# Patient Record
Sex: FEMALE | Race: WHITE | NOT HISPANIC OR LATINO | URBAN - METROPOLITAN AREA
[De-identification: names, ages, dates, MRNs, and addresses within clinical notes are randomized per-mention and may not be internally consistent; named-entity substitution may affect disease eponyms.]

---

## 2017-02-04 ENCOUNTER — OUTPATIENT (OUTPATIENT)
Dept: OUTPATIENT SERVICES | Facility: HOSPITAL | Age: 82
LOS: 1 days | Discharge: HOME | End: 2017-02-04

## 2017-06-27 DIAGNOSIS — Z00.00 ENCOUNTER FOR GENERAL ADULT MEDICAL EXAMINATION WITHOUT ABNORMAL FINDINGS: ICD-10-CM

## 2017-09-21 ENCOUNTER — INPATIENT (INPATIENT)
Facility: HOSPITAL | Age: 82
LOS: 7 days | Discharge: NURSING, REHAB OR LONG-TERM CARE | End: 2017-09-29
Attending: HOSPITALIST | Admitting: INTERNAL MEDICINE

## 2017-09-21 DIAGNOSIS — R07.9 CHEST PAIN, UNSPECIFIED: ICD-10-CM

## 2017-09-21 DIAGNOSIS — E78.5 HYPERLIPIDEMIA, UNSPECIFIED: ICD-10-CM

## 2017-09-21 DIAGNOSIS — S72.21XA DISPLACED SUBTROCHANTERIC FRACTURE OF RIGHT FEMUR, INITIAL ENCOUNTER FOR CLOSED FRACTURE: ICD-10-CM

## 2017-09-21 DIAGNOSIS — R29.6 REPEATED FALLS: ICD-10-CM

## 2017-09-21 DIAGNOSIS — E03.9 HYPOTHYROIDISM, UNSPECIFIED: ICD-10-CM

## 2017-09-30 ENCOUNTER — OUTPATIENT (OUTPATIENT)
Dept: OUTPATIENT SERVICES | Facility: HOSPITAL | Age: 82
LOS: 1 days | Discharge: HOME | End: 2017-09-30

## 2017-09-30 DIAGNOSIS — R29.6 REPEATED FALLS: ICD-10-CM

## 2017-09-30 DIAGNOSIS — R07.9 CHEST PAIN, UNSPECIFIED: ICD-10-CM

## 2017-09-30 DIAGNOSIS — R79.9 ABNORMAL FINDING OF BLOOD CHEMISTRY, UNSPECIFIED: ICD-10-CM

## 2017-09-30 DIAGNOSIS — E03.9 HYPOTHYROIDISM, UNSPECIFIED: ICD-10-CM

## 2017-09-30 DIAGNOSIS — S72.21XA DISPLACED SUBTROCHANTERIC FRACTURE OF RIGHT FEMUR, INITIAL ENCOUNTER FOR CLOSED FRACTURE: ICD-10-CM

## 2017-09-30 DIAGNOSIS — E78.5 HYPERLIPIDEMIA, UNSPECIFIED: ICD-10-CM

## 2017-10-04 DIAGNOSIS — E03.9 HYPOTHYROIDISM, UNSPECIFIED: ICD-10-CM
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
## 2017-10-04 DIAGNOSIS — D62 ACUTE POSTHEMORRHAGIC ANEMIA: ICD-10-CM

## 2017-10-04 DIAGNOSIS — S72.21XA DISPLACED SUBTROCHANTERIC FRACTURE OF RIGHT FEMUR, INITIAL ENCOUNTER FOR CLOSED FRACTURE: ICD-10-CM
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
## 2017-10-04 DIAGNOSIS — E78.00 PURE HYPERCHOLESTEROLEMIA, UNSPECIFIED: ICD-10-CM

## 2017-10-04 DIAGNOSIS — M81.0 AGE-RELATED OSTEOPOROSIS WITHOUT CURRENT PATHOLOGICAL FRACTURE: ICD-10-CM

## 2017-10-04 DIAGNOSIS — F03.90 UNSPECIFIED DEMENTIA, UNSPECIFIED SEVERITY, WITHOUT BEHAVIORAL DISTURBANCE, PSYCHOTIC DISTURBANCE, MOOD DISTURBANCE, AND ANXIETY: ICD-10-CM

## 2017-10-04 DIAGNOSIS — S72.001A FRACTURE OF UNSPECIFIED PART OF NECK OF RIGHT FEMUR, INITIAL ENCOUNTER FOR CLOSED FRACTURE: ICD-10-CM
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
## 2017-10-18 ENCOUNTER — OUTPATIENT (OUTPATIENT)
Dept: OUTPATIENT SERVICES | Facility: HOSPITAL | Age: 82
LOS: 1 days | Discharge: HOME | End: 2017-10-18

## 2017-10-18 DIAGNOSIS — S72.21XA DISPLACED SUBTROCHANTERIC FRACTURE OF RIGHT FEMUR, INITIAL ENCOUNTER FOR CLOSED FRACTURE: ICD-10-CM

## 2017-10-18 DIAGNOSIS — E78.5 HYPERLIPIDEMIA, UNSPECIFIED: ICD-10-CM

## 2017-10-18 DIAGNOSIS — R29.6 REPEATED FALLS: ICD-10-CM

## 2017-10-18 DIAGNOSIS — R79.9 ABNORMAL FINDING OF BLOOD CHEMISTRY, UNSPECIFIED: ICD-10-CM

## 2017-10-18 DIAGNOSIS — R07.9 CHEST PAIN, UNSPECIFIED: ICD-10-CM

## 2017-10-18 DIAGNOSIS — E03.9 HYPOTHYROIDISM, UNSPECIFIED: ICD-10-CM

## 2017-10-30 ENCOUNTER — OUTPATIENT (OUTPATIENT)
Dept: OUTPATIENT SERVICES | Facility: HOSPITAL | Age: 82
LOS: 1 days | Discharge: HOME | End: 2017-10-30

## 2017-10-30 DIAGNOSIS — R29.6 REPEATED FALLS: ICD-10-CM

## 2017-10-30 DIAGNOSIS — R79.9 ABNORMAL FINDING OF BLOOD CHEMISTRY, UNSPECIFIED: ICD-10-CM

## 2017-10-30 DIAGNOSIS — S72.21XA DISPLACED SUBTROCHANTERIC FRACTURE OF RIGHT FEMUR, INITIAL ENCOUNTER FOR CLOSED FRACTURE: ICD-10-CM

## 2017-10-30 DIAGNOSIS — E03.9 HYPOTHYROIDISM, UNSPECIFIED: ICD-10-CM

## 2017-10-30 DIAGNOSIS — N39.0 URINARY TRACT INFECTION, SITE NOT SPECIFIED: ICD-10-CM

## 2017-10-30 DIAGNOSIS — E78.5 HYPERLIPIDEMIA, UNSPECIFIED: ICD-10-CM

## 2017-10-30 DIAGNOSIS — R07.9 CHEST PAIN, UNSPECIFIED: ICD-10-CM

## 2018-01-24 ENCOUNTER — INPATIENT (INPATIENT)
Facility: HOSPITAL | Age: 83
LOS: 0 days | Discharge: HOME HEALTH | End: 2018-01-25
Attending: INTERNAL MEDICINE | Admitting: INTERNAL MEDICINE

## 2018-01-30 DIAGNOSIS — E03.9 HYPOTHYROIDISM, UNSPECIFIED: ICD-10-CM

## 2018-01-30 DIAGNOSIS — R50.9 FEVER, UNSPECIFIED: ICD-10-CM

## 2018-01-30 DIAGNOSIS — J11.1 INFLUENZA DUE TO UNIDENTIFIED INFLUENZA VIRUS WITH OTHER RESPIRATORY MANIFESTATIONS: ICD-10-CM

## 2018-01-30 DIAGNOSIS — F03.90 UNSPECIFIED DEMENTIA WITHOUT BEHAVIORAL DISTURBANCE: ICD-10-CM

## 2018-01-30 DIAGNOSIS — M81.0 AGE-RELATED OSTEOPOROSIS WITHOUT CURRENT PATHOLOGICAL FRACTURE: ICD-10-CM

## 2018-01-30 DIAGNOSIS — Z88.2 ALLERGY STATUS TO SULFONAMIDES: ICD-10-CM

## 2018-02-04 DIAGNOSIS — R29.6 REPEATED FALLS: ICD-10-CM

## 2018-02-04 DIAGNOSIS — E78.5 HYPERLIPIDEMIA, UNSPECIFIED: ICD-10-CM

## 2018-02-04 DIAGNOSIS — J11.1 INFLUENZA DUE TO UNIDENTIFIED INFLUENZA VIRUS WITH OTHER RESPIRATORY MANIFESTATIONS: ICD-10-CM

## 2018-02-04 DIAGNOSIS — R07.9 CHEST PAIN, UNSPECIFIED: ICD-10-CM

## 2018-02-04 DIAGNOSIS — S72.21XA DISPLACED SUBTROCHANTERIC FRACTURE OF RIGHT FEMUR, INITIAL ENCOUNTER FOR CLOSED FRACTURE: ICD-10-CM

## 2018-02-04 DIAGNOSIS — E03.9 HYPOTHYROIDISM, UNSPECIFIED: ICD-10-CM

## 2018-04-27 ENCOUNTER — OUTPATIENT (OUTPATIENT)
Dept: OUTPATIENT SERVICES | Facility: HOSPITAL | Age: 83
LOS: 1 days | Discharge: HOME | End: 2018-04-27

## 2018-04-27 DIAGNOSIS — G31.9 DEGENERATIVE DISEASE OF NERVOUS SYSTEM, UNSPECIFIED: ICD-10-CM

## 2018-05-04 ENCOUNTER — OUTPATIENT (OUTPATIENT)
Dept: OUTPATIENT SERVICES | Facility: HOSPITAL | Age: 83
LOS: 1 days | Discharge: HOME | End: 2018-05-04

## 2018-05-04 DIAGNOSIS — F31.9 BIPOLAR DISORDER, UNSPECIFIED: ICD-10-CM

## 2018-08-23 ENCOUNTER — EMERGENCY (EMERGENCY)
Facility: HOSPITAL | Age: 83
LOS: 0 days | Discharge: HOME | End: 2018-08-23
Attending: EMERGENCY MEDICINE | Admitting: EMERGENCY MEDICINE

## 2018-08-23 VITALS — RESPIRATION RATE: 16 BRPM | DIASTOLIC BLOOD PRESSURE: 58 MMHG | TEMPERATURE: 97 F | SYSTOLIC BLOOD PRESSURE: 128 MMHG

## 2018-08-23 VITALS
SYSTOLIC BLOOD PRESSURE: 123 MMHG | HEART RATE: 79 BPM | RESPIRATION RATE: 16 BRPM | DIASTOLIC BLOOD PRESSURE: 60 MMHG | TEMPERATURE: 98 F | OXYGEN SATURATION: 98 %

## 2018-08-23 DIAGNOSIS — F41.0 PANIC DISORDER [EPISODIC PAROXYSMAL ANXIETY]: ICD-10-CM

## 2018-08-23 DIAGNOSIS — R30.0 DYSURIA: ICD-10-CM

## 2018-08-23 DIAGNOSIS — E03.9 HYPOTHYROIDISM, UNSPECIFIED: ICD-10-CM

## 2018-08-23 DIAGNOSIS — Z88.2 ALLERGY STATUS TO SULFONAMIDES: ICD-10-CM

## 2018-08-23 DIAGNOSIS — F03.90 UNSPECIFIED DEMENTIA WITHOUT BEHAVIORAL DISTURBANCE: ICD-10-CM

## 2018-08-23 DIAGNOSIS — I10 ESSENTIAL (PRIMARY) HYPERTENSION: ICD-10-CM

## 2018-08-23 LAB
ALBUMIN SERPL ELPH-MCNC: 4.2 G/DL — SIGNIFICANT CHANGE UP (ref 3.5–5.2)
ALP SERPL-CCNC: 66 U/L — SIGNIFICANT CHANGE UP (ref 30–115)
ALT FLD-CCNC: 8 U/L — SIGNIFICANT CHANGE UP (ref 0–41)
ANION GAP SERPL CALC-SCNC: 15 MMOL/L — HIGH (ref 7–14)
APPEARANCE UR: CLEAR — SIGNIFICANT CHANGE UP
AST SERPL-CCNC: 22 U/L — SIGNIFICANT CHANGE UP (ref 0–41)
BASOPHILS # BLD AUTO: 0.07 K/UL — SIGNIFICANT CHANGE UP (ref 0–0.2)
BASOPHILS NFR BLD AUTO: 1.3 % — HIGH (ref 0–1)
BILIRUB SERPL-MCNC: 0.4 MG/DL — SIGNIFICANT CHANGE UP (ref 0.2–1.2)
BILIRUB UR-MCNC: NEGATIVE — SIGNIFICANT CHANGE UP
BUN SERPL-MCNC: 25 MG/DL — HIGH (ref 10–20)
CALCIUM SERPL-MCNC: 9.5 MG/DL — SIGNIFICANT CHANGE UP (ref 8.5–10.1)
CHLORIDE SERPL-SCNC: 103 MMOL/L — SIGNIFICANT CHANGE UP (ref 98–110)
CO2 SERPL-SCNC: 26 MMOL/L — SIGNIFICANT CHANGE UP (ref 17–32)
COLOR SPEC: YELLOW — SIGNIFICANT CHANGE UP
CREAT SERPL-MCNC: 1 MG/DL — SIGNIFICANT CHANGE UP (ref 0.7–1.5)
DIFF PNL FLD: NEGATIVE — SIGNIFICANT CHANGE UP
EOSINOPHIL # BLD AUTO: 0.13 K/UL — SIGNIFICANT CHANGE UP (ref 0–0.7)
EOSINOPHIL NFR BLD AUTO: 2.3 % — SIGNIFICANT CHANGE UP (ref 0–8)
GLUCOSE SERPL-MCNC: 100 MG/DL — HIGH (ref 70–99)
GLUCOSE UR QL: NEGATIVE MG/DL — SIGNIFICANT CHANGE UP
HCT VFR BLD CALC: 33.5 % — LOW (ref 37–47)
HGB BLD-MCNC: 11 G/DL — LOW (ref 12–16)
IMM GRANULOCYTES NFR BLD AUTO: 0.4 % — HIGH (ref 0.1–0.3)
KETONES UR-MCNC: NEGATIVE — SIGNIFICANT CHANGE UP
LACTATE SERPL-SCNC: 1.3 MMOL/L — SIGNIFICANT CHANGE UP (ref 0.5–2.2)
LEUKOCYTE ESTERASE UR-ACNC: NEGATIVE — SIGNIFICANT CHANGE UP
LYMPHOCYTES # BLD AUTO: 1.09 K/UL — LOW (ref 1.2–3.4)
LYMPHOCYTES # BLD AUTO: 19.5 % — LOW (ref 20.5–51.1)
MAGNESIUM SERPL-MCNC: 2.2 MG/DL — SIGNIFICANT CHANGE UP (ref 1.8–2.4)
MCHC RBC-ENTMCNC: 29.5 PG — SIGNIFICANT CHANGE UP (ref 27–31)
MCHC RBC-ENTMCNC: 32.8 G/DL — SIGNIFICANT CHANGE UP (ref 32–37)
MCV RBC AUTO: 89.8 FL — SIGNIFICANT CHANGE UP (ref 81–99)
MONOCYTES # BLD AUTO: 0.48 K/UL — SIGNIFICANT CHANGE UP (ref 0.1–0.6)
MONOCYTES NFR BLD AUTO: 8.6 % — SIGNIFICANT CHANGE UP (ref 1.7–9.3)
NEUTROPHILS # BLD AUTO: 3.81 K/UL — SIGNIFICANT CHANGE UP (ref 1.4–6.5)
NEUTROPHILS NFR BLD AUTO: 67.9 % — SIGNIFICANT CHANGE UP (ref 42.2–75.2)
NITRITE UR-MCNC: NEGATIVE — SIGNIFICANT CHANGE UP
NRBC # BLD: 0 /100 WBCS — SIGNIFICANT CHANGE UP (ref 0–0)
PH UR: 7.5 — SIGNIFICANT CHANGE UP (ref 5–8)
PLATELET # BLD AUTO: 248 K/UL — SIGNIFICANT CHANGE UP (ref 130–400)
POTASSIUM SERPL-MCNC: 5.6 MMOL/L — HIGH (ref 3.5–5)
POTASSIUM SERPL-SCNC: 5.6 MMOL/L — HIGH (ref 3.5–5)
PROT SERPL-MCNC: 6.9 G/DL — SIGNIFICANT CHANGE UP (ref 6–8)
PROT UR-MCNC: NEGATIVE MG/DL — SIGNIFICANT CHANGE UP
RBC # BLD: 3.73 M/UL — LOW (ref 4.2–5.4)
RBC # FLD: 12.8 % — SIGNIFICANT CHANGE UP (ref 11.5–14.5)
SODIUM SERPL-SCNC: 144 MMOL/L — SIGNIFICANT CHANGE UP (ref 135–146)
SP GR SPEC: 1.01 — SIGNIFICANT CHANGE UP (ref 1.01–1.03)
TROPONIN T SERPL-MCNC: <0.01 NG/ML — SIGNIFICANT CHANGE UP
UROBILINOGEN FLD QL: 0.2 MG/DL — SIGNIFICANT CHANGE UP (ref 0.2–0.2)
WBC # BLD: 5.6 K/UL — SIGNIFICANT CHANGE UP (ref 4.8–10.8)
WBC # FLD AUTO: 5.6 K/UL — SIGNIFICANT CHANGE UP (ref 4.8–10.8)

## 2018-08-23 RX ORDER — ASPIRIN/CALCIUM CARB/MAGNESIUM 324 MG
325 TABLET ORAL ONCE
Qty: 0 | Refills: 0 | Status: COMPLETED | OUTPATIENT
Start: 2018-08-23 | End: 2018-08-23

## 2018-08-23 RX ADMIN — Medication 325 MILLIGRAM(S): at 19:47

## 2018-08-23 NOTE — ED ADULT NURSE NOTE - NSIMPLEMENTINTERV_GEN_ALL_ED
Implemented All Fall with Harm Risk Interventions:  [place] to call system. Call bell, personal items and telephone within reach. Instruct patient to call for assistance. Room bathroom lighting operational. Non-slip footwear when patient is off stretcher. Physically safe environment: no spills, clutter or unnecessary equipment. Stretcher in lowest position, wheels locked, appropriate side rails in place. Provide visual cue, wrist band, yellow gown, etc. Monitor gait and stability. Monitor for mental status changes and reorient to person, place, and time. Review medications for side effects contributing to fall risk. Reinforce activity limits and safety measures with patient and family. Provide visual clues: red socks.

## 2018-08-23 NOTE — ED PROVIDER NOTE - DISCHARGE DATE
Pt requesting that message be sent to Dr. Truong to see if he will prescribe a back brace.   23-Aug-2018

## 2018-08-23 NOTE — ED PROVIDER NOTE - OBJECTIVE STATEMENT
89 y f pmh dementia, htn, hypothyroid pw confusion. Pt has been more confused than baseline for the past week. Pt's aide says that she is more paranoid and thinks strangers are out to get her. Hx of this in the past when she had a UTI. Has also been c/o dysuria and pain with urination. Incontinent at baseline and uses a diaper. Denies fever, chills, n/v, diarrhea, melena, hematochezia, constipation, back pain, cp, abd pain.

## 2018-08-23 NOTE — ED PROVIDER NOTE - PROGRESS NOTE DETAILS
Attending Note: I personally evaluated the patient. I reviewed the Resident’s note (as assigned above), and agree with the findings and plan except as documented in my note.   89 y/o F PMHx HTN, hypothyroid, and dementia brought in due to episodes of confusion. When I interviewed pt, aide not at bedside. As per aide, pt has been acting odd recently and tried to go outside in the rain. Pt has had episodes of UTI in past with confusion. Denies recent trauma, but aide notes her memory has also decreased recently. No CP or SOB. No abdominal pain. No back pain. Pt does c/o urinary SX. No speech, visual ,sensory or motor deficits. PE: well appearing, elderly F, NAD. VS noted. Lungs CTA. S1S2. Abdomen soft, mild suprapubic TTP, no CVAT.  Extremities no CCE. Neuro non focal except for memory. Pt also thinks her legs were replaced but that’s seems to be chronic and she is referring to her hip replacement. A/P: Will evaluate CT head, labs, UA, and reassess. Spoke with pt's son Ron. Pt lives with him and has aides during the day. Son is coming to pick her up and bring her home. Discussed labs and imaging with son. Will DC and wait for pt's ride home. Pt awake, alert, no complaints. Lying in bed comfortably.

## 2018-08-23 NOTE — ED PROVIDER NOTE - PHYSICAL EXAMINATION
CONSTITUTIONAL: Well-developed; well-nourished; in no acute distress.   SKIN: warm, dry  HEAD: Normocephalic; atraumatic.  EYES: PERRL, EOMI, normal sclera and conjunctiva   ENT: No nasal discharge; airway clear.  NECK: Supple; non tender.  CARD: S1, S2 normal; no murmurs, gallops, or rubs. Regular rate and rhythm.   RESP: No wheezes, rales or rhonchi.  ABD: soft, nondistended. TTP over suprapubic region.   EXT: Normal ROM.  No clubbing, cyanosis or edema.   LYMPH: No acute cervical adenopathy.  NEURO: Alert, awake, interactive. Acting at baseline per pt's aide. No cranial nerve deficits, moving all extremities well. No drift.   PSYCH: Cooperative, appropriate.

## 2018-08-23 NOTE — ED PROVIDER NOTE - NS ED ROS FT
Eyes:  No visual changes, eye pain or discharge.  ENMT:  No hearing changes, pain, discharge or infections. No neck pain or stiffness.  Cardiac:  No chest pain, SOB or edema.   Respiratory:  No cough or respiratory distress.   GI:  No nausea, vomiting, diarrhea or abdominal pain.  :  Dysuria. No increased frequency.  MS:  No myalgia, muscle weakness, joint pain or back pain.  Neuro: Confusion. No headache or weakness.  No LOC.  Skin:  No skin rash.   Endocrine: No history of diabetes. Hypothyroid.

## 2018-08-24 LAB
CULTURE RESULTS: NO GROWTH — SIGNIFICANT CHANGE UP
SPECIMEN SOURCE: SIGNIFICANT CHANGE UP

## 2018-10-29 ENCOUNTER — EMERGENCY (EMERGENCY)
Facility: HOSPITAL | Age: 83
LOS: 0 days | Discharge: HOME | End: 2018-10-29
Attending: EMERGENCY MEDICINE | Admitting: EMERGENCY MEDICINE

## 2018-10-29 VITALS
SYSTOLIC BLOOD PRESSURE: 189 MMHG | TEMPERATURE: 98 F | RESPIRATION RATE: 18 BRPM | DIASTOLIC BLOOD PRESSURE: 80 MMHG | OXYGEN SATURATION: 99 % | HEART RATE: 76 BPM

## 2018-10-29 DIAGNOSIS — Z88.2 ALLERGY STATUS TO SULFONAMIDES: ICD-10-CM
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
## 2018-10-29 DIAGNOSIS — M54.9 DORSALGIA, UNSPECIFIED: ICD-10-CM
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
## 2018-10-29 DIAGNOSIS — E03.9 HYPOTHYROIDISM, UNSPECIFIED: ICD-10-CM
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
## 2018-10-29 DIAGNOSIS — I10 ESSENTIAL (PRIMARY) HYPERTENSION: ICD-10-CM

## 2018-10-29 LAB
ALBUMIN SERPL ELPH-MCNC: 3.8 G/DL — SIGNIFICANT CHANGE UP (ref 3.5–5.2)
ALP SERPL-CCNC: 61 U/L — SIGNIFICANT CHANGE UP (ref 30–115)
ALT FLD-CCNC: 12 U/L — SIGNIFICANT CHANGE UP (ref 0–41)
ANION GAP SERPL CALC-SCNC: 10 MMOL/L — SIGNIFICANT CHANGE UP (ref 7–14)
APTT BLD: SIGNIFICANT CHANGE UP SEC (ref 27–39.2)
AST SERPL-CCNC: 26 U/L — SIGNIFICANT CHANGE UP (ref 0–41)
BASOPHILS # BLD AUTO: 0.03 K/UL — SIGNIFICANT CHANGE UP (ref 0–0.2)
BASOPHILS NFR BLD AUTO: 0.5 % — SIGNIFICANT CHANGE UP (ref 0–1)
BILIRUB SERPL-MCNC: 0.3 MG/DL — SIGNIFICANT CHANGE UP (ref 0.2–1.2)
BUN SERPL-MCNC: 24 MG/DL — HIGH (ref 10–20)
CALCIUM SERPL-MCNC: 9.7 MG/DL — SIGNIFICANT CHANGE UP (ref 8.5–10.1)
CHLORIDE SERPL-SCNC: 101 MMOL/L — SIGNIFICANT CHANGE UP (ref 98–110)
CO2 SERPL-SCNC: 25 MMOL/L — SIGNIFICANT CHANGE UP (ref 17–32)
CREAT SERPL-MCNC: 1 MG/DL — SIGNIFICANT CHANGE UP (ref 0.7–1.5)
EOSINOPHIL # BLD AUTO: 0.1 K/UL — SIGNIFICANT CHANGE UP (ref 0–0.7)
EOSINOPHIL NFR BLD AUTO: 1.8 % — SIGNIFICANT CHANGE UP (ref 0–8)
GLUCOSE SERPL-MCNC: 94 MG/DL — SIGNIFICANT CHANGE UP (ref 70–99)
HCT VFR BLD CALC: 34.2 % — LOW (ref 37–47)
HGB BLD-MCNC: 11 G/DL — LOW (ref 12–16)
IMM GRANULOCYTES NFR BLD AUTO: 1.8 % — HIGH (ref 0.1–0.3)
INR BLD: 0.88 RATIO — SIGNIFICANT CHANGE UP (ref 0.65–1.3)
LIDOCAIN IGE QN: 81 U/L — HIGH (ref 7–60)
LYMPHOCYTES # BLD AUTO: 0.83 K/UL — LOW (ref 1.2–3.4)
LYMPHOCYTES # BLD AUTO: 14.8 % — LOW (ref 20.5–51.1)
MCHC RBC-ENTMCNC: 29.3 PG — SIGNIFICANT CHANGE UP (ref 27–31)
MCHC RBC-ENTMCNC: 32.2 G/DL — SIGNIFICANT CHANGE UP (ref 32–37)
MCV RBC AUTO: 91 FL — SIGNIFICANT CHANGE UP (ref 81–99)
MONOCYTES # BLD AUTO: 0.33 K/UL — SIGNIFICANT CHANGE UP (ref 0.1–0.6)
MONOCYTES NFR BLD AUTO: 5.9 % — SIGNIFICANT CHANGE UP (ref 1.7–9.3)
NEUTROPHILS # BLD AUTO: 4.23 K/UL — SIGNIFICANT CHANGE UP (ref 1.4–6.5)
NEUTROPHILS NFR BLD AUTO: 75.2 % — SIGNIFICANT CHANGE UP (ref 42.2–75.2)
PLATELET # BLD AUTO: 230 K/UL — SIGNIFICANT CHANGE UP (ref 130–400)
POTASSIUM SERPL-MCNC: 5.3 MMOL/L — HIGH (ref 3.5–5)
POTASSIUM SERPL-SCNC: 5.3 MMOL/L — HIGH (ref 3.5–5)
PROT SERPL-MCNC: 6.9 G/DL — SIGNIFICANT CHANGE UP (ref 6–8)
PROTHROM AB SERPL-ACNC: 10.1 SEC — SIGNIFICANT CHANGE UP (ref 9.95–12.87)
RBC # BLD: 3.76 M/UL — LOW (ref 4.2–5.4)
RBC # FLD: 12.5 % — SIGNIFICANT CHANGE UP (ref 11.5–14.5)
SODIUM SERPL-SCNC: 136 MMOL/L — SIGNIFICANT CHANGE UP (ref 135–146)
WBC # BLD: 5.62 K/UL — SIGNIFICANT CHANGE UP (ref 4.8–10.8)
WBC # FLD AUTO: 5.62 K/UL — SIGNIFICANT CHANGE UP (ref 4.8–10.8)

## 2018-10-29 NOTE — ED ADULT NURSE NOTE - NSIMPLEMENTINTERV_GEN_ALL_ED
Implemented All Fall with Harm Risk Interventions:  [place]va to call system. Call bell, personal items and telephone within reach. Instruct patient to call for assistance. Room bathroom lighting operational. Non-slip footwear when patient is off stretcher. Physically safe environment: no spills, clutter or unnecessary equipment. Stretcher in lowest position, wheels locked, appropriate side rails in place. Provide visual cue, wrist band, yellow gown, etc. Monitor gait and stability. Monitor for mental status changes and reorient to person, place, and time. Review medications for side effects contributing to fall risk. Reinforce activity limits and safety measures with patient and family. Provide visual clues: red socks.

## 2018-10-29 NOTE — ED PROVIDER NOTE - NS ED ROS FT
Constitutional: No fever  Eyes:  No visual changes  ENMT:  No neck pain  Cardiac:  No chest pain  Respiratory:  No cough, SOB  GI:  No nausea, vomiting, abdominal pain.  :  No dysuria  MS:  +back pain.  Neuro:  No headache  Skin:  No skin rash  Endocrine: h/o hypothyroid  Except as documented in the HPI,  all other systems are negative.

## 2018-10-29 NOTE — ED PROVIDER NOTE - OBJECTIVE STATEMENT
89yo F with PMHx dementia, HTN, hypothyroid, p/w back pain s/p mechanical fall. Pt is with home health aide who states pt fell when trying to get out of bed this am. No head trauma or LOC. Having more frequent falls and worsening of dementia recently.

## 2018-10-29 NOTE — ED PROVIDER NOTE - PROGRESS NOTE DETAILS
ED Attending Note: I personally evaluated the patient. I reviewed the Resident’s note (as assigned above), and agree with the findings and plan except as documented in my note.   89 y/o F with home assistant at bedside p/w worsening back pain due to frequent falls. When pt tried to get out of bed this AM she fell. PE: non-toxic elderly female, GCS 15. Equal radial pulses b/l, EOMI, PEERL. Kyphotic. FROM x4, no c-d-s spine tenderness. Abdomen soft. Pt is able to ambulate in ED with assistance. No ataxic. No respiratory distress. Plan: labs, imaging, reassess. pending imaging /report

## 2019-11-14 ENCOUNTER — APPOINTMENT (OUTPATIENT)
Dept: GERIATRICS | Facility: CLINIC | Age: 84
End: 2019-11-14
Payer: MEDICARE

## 2019-11-14 ENCOUNTER — OUTPATIENT (OUTPATIENT)
Dept: OUTPATIENT SERVICES | Facility: HOSPITAL | Age: 84
LOS: 1 days | Discharge: HOME | End: 2019-11-14

## 2019-11-14 VITALS — SYSTOLIC BLOOD PRESSURE: 150 MMHG | DIASTOLIC BLOOD PRESSURE: 81 MMHG | HEART RATE: 63 BPM

## 2019-11-14 DIAGNOSIS — Z00.00 ENCOUNTER FOR GENERAL ADULT MEDICAL EXAMINATION W/OUT ABNORMAL FINDINGS: ICD-10-CM

## 2019-11-14 DIAGNOSIS — Z86.39 PERSONAL HISTORY OF OTHER ENDOCRINE, NUTRITIONAL AND METABOLIC DISEASE: ICD-10-CM

## 2019-11-14 PROCEDURE — ZZZZZ: CPT

## 2019-11-14 PROCEDURE — 99203 OFFICE O/P NEW LOW 30 MIN: CPT | Mod: GC

## 2019-11-14 NOTE — PHYSICAL EXAM
[Neck Appearance] : the appearance of the neck was normal [General Appearance - Alert] : alert [Heart Sounds] : normal S1 and S2 [] : no respiratory distress [Bowel Sounds] : normal bowel sounds [Abdomen Soft] : soft [FreeTextEntry1] : slow shuffling gait

## 2019-11-14 NOTE — END OF VISIT
[] : Resident [FreeTextEntry3] : Seen by [employer] team\par 89 year old with advanced dementia being cared for by supportive family at home; has HHA 9-5 7 days a week; previously on seroquel up to 00mg at night but periods of unrest, kagitation and visual and auditory hallucinations worsening; here today with son, daughter russ and HHA; she is in w/c; cooperative; also family notes incresed depression with crying; her behavior is occassionally agitative; after much discussion decided to \par 1. stop risperdal\par 2. start trazodone in evening to assist with very poor and disruptive sleep\par 3. start ow dose sertraline for depression\par counseled family to not argue with her re hallucinations and instead stay calm and roll with it as the hallucinations don’t bother the patient.  will get labs  RTC 6 weeks; also discussed psychiatric f/u once started on these meds.

## 2019-11-14 NOTE — HISTORY OF PRESENT ILLNESS
[1] : 2) Feeling down, depressed, or hopeless for several days [FreeTextEntry1] : 89 year old female with hx of advanced alzheimer, hypothyroidism, presenting to [facility]. Family reports patient is having increased episodes of forgetfullness, agitation, confusion, visual and auditory hallucinations. she also has episodes of falls. she had total hip replacement 2 years ago. gait is hshuffling and she uses a cane at home. family also reports mood swings, and suicidal thoughts

## 2019-11-14 NOTE — ASSESSMENT
[FreeTextEntry1] : 89 year old with hx of alzheimers dementia presenting to [facility] care\par \par #Dementia with psychotic features and depression\par - stop resperidone\par - start sertraline 25 qhs\par - start trazodone 50 qhs\par - labs for cbc, cmo, tsh\par - f/u in 4-6 weeks\par - influenza shot given

## 2019-12-11 LAB
ALBUMIN SERPL ELPH-MCNC: 4.1 G/DL
ALP BLD-CCNC: 68 U/L
ALT SERPL-CCNC: 11 U/L
ANION GAP SERPL CALC-SCNC: 12 MMOL/L
AST SERPL-CCNC: 18 U/L
BASOPHILS # BLD AUTO: 0.07 K/UL
BASOPHILS NFR BLD AUTO: 1 %
BILIRUB SERPL-MCNC: <0.2 MG/DL
BUN SERPL-MCNC: 37 MG/DL
CALCIUM SERPL-MCNC: 9.3 MG/DL
CHLORIDE SERPL-SCNC: 103 MMOL/L
CO2 SERPL-SCNC: 26 MMOL/L
CREAT SERPL-MCNC: 1 MG/DL
EOSINOPHIL # BLD AUTO: 0.16 K/UL
EOSINOPHIL NFR BLD AUTO: 2.3 %
GLUCOSE SERPL-MCNC: 78 MG/DL
HCT VFR BLD CALC: 32.4 %
HGB BLD-MCNC: 10.3 G/DL
IMM GRANULOCYTES NFR BLD AUTO: 0.3 %
LYMPHOCYTES # BLD AUTO: 1.12 K/UL
LYMPHOCYTES NFR BLD AUTO: 16.4 %
MAN DIFF?: NORMAL
MCHC RBC-ENTMCNC: 29.9 PG
MCHC RBC-ENTMCNC: 31.8 G/DL
MCV RBC AUTO: 93.9 FL
MONOCYTES # BLD AUTO: 0.64 K/UL
MONOCYTES NFR BLD AUTO: 9.4 %
NEUTROPHILS # BLD AUTO: 4.82 K/UL
NEUTROPHILS NFR BLD AUTO: 70.6 %
PLATELET # BLD AUTO: 239 K/UL
POTASSIUM SERPL-SCNC: 4.7 MMOL/L
PROT SERPL-MCNC: 6.5 G/DL
RBC # BLD: 3.45 M/UL
RBC # FLD: 13.2 %
SODIUM SERPL-SCNC: 141 MMOL/L
TSH SERPL-ACNC: 5.01 UIU/ML
WBC # FLD AUTO: 6.83 K/UL

## 2019-12-19 ENCOUNTER — OUTPATIENT (OUTPATIENT)
Dept: OUTPATIENT SERVICES | Facility: HOSPITAL | Age: 84
LOS: 1 days | Discharge: HOME | End: 2019-12-19

## 2019-12-19 ENCOUNTER — APPOINTMENT (OUTPATIENT)
Dept: GERIATRICS | Facility: CLINIC | Age: 84
End: 2019-12-19
Payer: MEDICARE

## 2019-12-19 VITALS
BODY MASS INDEX: 22.25 KG/M2 | WEIGHT: 106 LBS | HEART RATE: 99 BPM | DIASTOLIC BLOOD PRESSURE: 64 MMHG | SYSTOLIC BLOOD PRESSURE: 130 MMHG | TEMPERATURE: 98.2 F | HEIGHT: 58 IN

## 2019-12-19 PROCEDURE — 99214 OFFICE O/P EST MOD 30 MIN: CPT

## 2019-12-19 NOTE — HISTORY OF PRESENT ILLNESS
[FreeTextEntry1] : 89 year old lady with advanced, hypothyroidism presenting for routine follow up. The family states that she is having increased hallucinations, difficulty sleeping at night and worsening urinary incontinance. They also wish for her ears to be checked as she is not hearing well. They state that she is less combative as well. They would also like to speak to a  about assistence with services as she is becoming a 2 person assist and is not able to feed herself anymore let alone hold a fork.

## 2019-12-19 NOTE — END OF VISIT
[] : Resident [FreeTextEntry3] : Seen with [employer] TEAM\par 1. overall doing much better on sertraline/trazadone combination; still has visual hallucinations but not bothersome to her; is much less physically aggressive OFF RISPERDAL; also smiling more and seems less depressed; is taking low doses of both medications; after long discussion with family we decided not to increase meds but cont to observe with current tx; Plan for son to call me with further clinical f/u in 4 weeks (has been on this regimen for only one month\par 2. still with insomnia but will cont with current dose of trazadone; also takes camamile tea twice a day; no response to melatonin in past\par 3. has 4 more days of amoxilfor UTI; will change to liquid for ease of administration\par 4. had CSW  family on current hours of aid they get - is 45 hours a week and is unlikely they will get more as it was just assigned by her [facility].\par RTC 3 months; will speak with son in 4 weeks as above.

## 2019-12-19 NOTE — PHYSICAL EXAM
[General Appearance - In No Acute Distress] : in no acute distress [General Appearance - Alert] : alert [Sclera] : the sclera and conjunctiva were normal [Extraocular Movements] : extraocular movements were intact [PERRL With Normal Accommodation] : pupils were equal in size, round, and reactive to light [Normal Oral Mucosa] : normal oral mucosa [No Oral Cyanosis] : no oral cyanosis [No Oral Pallor] : no oral pallor [Oropharynx] : The oropharynx was normal [Outer Ear] : the ears and nose were normal in appearance [Jugular Venous Distention Increased] : there was no jugular-venous distention [Thyroid Diffuse Enlargement] : the thyroid was not enlarged [Neck Appearance] : the appearance of the neck was normal [Neck Cervical Mass (___cm)] : no neck mass was observed [Thyroid Nodule] : there were no palpable thyroid nodules [Heart Rate And Rhythm] : heart rate was normal and rhythm regular [Auscultation Breath Sounds / Voice Sounds] : lungs were clear to auscultation bilaterally [Heart Sounds] : normal S1 and S2 [Heart Sounds Gallop] : no gallops [Murmurs] : no murmurs [Heart Sounds Pericardial Friction Rub] : no pericardial rub [Bowel Sounds] : normal bowel sounds [Abdomen Soft] : soft [Abdomen Tenderness] : non-tender [Abdomen Mass (___ Cm)] : no abdominal mass palpated [Abnormal Walk] : normal gait [Nail Clubbing] : no clubbing  or cyanosis of the fingernails [Musculoskeletal - Swelling] : no joint swelling seen [Skin Color & Pigmentation] : normal skin color and pigmentation [Skin Turgor] : normal skin turgor [Motor Tone] : muscle strength and tone were normal [] : no rash [Deep Tendon Reflexes (DTR)] : deep tendon reflexes were 2+ and symmetric [Sensation] : the sensory exam was normal to light touch and pinprick [No Focal Deficits] : no focal deficits [Impaired Insight] : insight and judgment were intact [Affect] : the affect was normal [FreeTextEntry1] : AAOx1

## 2019-12-19 NOTE — ASSESSMENT
[FreeTextEntry1] : 89 year old lady with advanced, hypothyroidism presenting for routine follow up. \par \par #Dementia with psychotic features and depression\par - c/w sertraline 25 qhs\par - c/w trazodone 50 qhs\par - influenza shot given. \par \par UTI\par -will send amox liquid\par \par will have  speak to family for evaluation of assistance\par \par RTC 6 months and PRN\par

## 2019-12-27 ENCOUNTER — RX RENEWAL (OUTPATIENT)
Age: 84
End: 2019-12-27

## 2020-01-02 ENCOUNTER — OUTPATIENT (OUTPATIENT)
Dept: OUTPATIENT SERVICES | Facility: HOSPITAL | Age: 85
LOS: 1 days | Discharge: HOME | End: 2020-01-02

## 2020-01-02 ENCOUNTER — APPOINTMENT (OUTPATIENT)
Dept: GERIATRICS | Facility: CLINIC | Age: 85
End: 2020-01-02
Payer: MEDICARE

## 2020-01-02 VITALS
HEART RATE: 66 BPM | HEIGHT: 58 IN | WEIGHT: 105 LBS | SYSTOLIC BLOOD PRESSURE: 153 MMHG | BODY MASS INDEX: 22.04 KG/M2 | TEMPERATURE: 97.5 F | DIASTOLIC BLOOD PRESSURE: 84 MMHG

## 2020-01-02 DIAGNOSIS — F02.80 ALZHEIMER'S DISEASE, UNSPECIFIED: ICD-10-CM

## 2020-01-02 DIAGNOSIS — G30.9 ALZHEIMER'S DISEASE, UNSPECIFIED: ICD-10-CM

## 2020-01-02 PROCEDURE — 99214 OFFICE O/P EST MOD 30 MIN: CPT

## 2020-01-02 RX ORDER — TRAZODONE HYDROCHLORIDE 50 MG/1
50 TABLET ORAL
Qty: 30 | Refills: 5 | Status: DISCONTINUED | COMMUNITY
Start: 2019-11-14 | End: 2020-01-02

## 2020-01-02 NOTE — HISTORY OF PRESENT ILLNESS
[Severe] : Stage: Severe [Worse] : Status: Worse [None] : ~He/She~ has no significant interval events [Patient Observed To Be Agitated] : improved agitation [Hostility Toward Caregivers] : improved aggression [Sleep Disturbances] : worsened sleep disturbances [] : worsened wandering [Fixed Beliefs Contradicted By Reality (Delusions)] : worsened delusions [FreeTextEntry1] : 89 year old female with history of advanced dementia w/ psychotic features and hypothyroidism presents for follow-up after being seen 2 weeks previously. Family at bedside states that patient has not been sleeping at all overnight and has been wandering the house at night. This AM before presentation, family found her laying on the floor beside her bed and were concerned that she had a fall; however, they deny any head trauma or other injuries. Patient also has been having hallucinations, mostly at night but also during the day. Mood has been okay, no further suicidal ideation. Patient is currently taking trazodone 50mg and sertraline 25mg; family believes the medications are not working. Eating and drinking okay. ROS otherwise normal.  [de-identified] : not sleeping well

## 2020-01-02 NOTE — ASSESSMENT
[FreeTextEntry1] : 89 year old female with history of advanced dementia w/ psychotic features and hypothyroidism presents for follow-up after being seen 2 weeks previously with worsening sleep disturbances.\par \par #Advanced dementia w/ psychotic features\par -previously on seroquel but was stopped due to increased aggression and suicidality\par -was then on risperidone but stopped due to drooling\par -currently on trazodone and sertraline in an effort to address depressive symptoms; however, now sleep is worse\par -will d/c trazodone, continue with zoloft 50mg\par -will start zyprexa 2.5mg qd\par \par #hypothyroidism\par -c/w synthroid 50mcg qd\par \par #UTI\par -c/w macrobid 100mg (last day 1/3/20)\par \par RTC in 3 months or prn

## 2020-01-02 NOTE — PHYSICAL EXAM
[General Appearance - In No Acute Distress] : in no acute distress [General Appearance - Well-Appearing] : healthy appearing [Sclera] : the sclera and conjunctiva were normal [Normal Oral Mucosa] : normal oral mucosa [Outer Ear] : the ears and nose were normal in appearance [Neck Appearance] : the appearance of the neck was normal [] : no respiratory distress [Respiration, Rhythm And Depth] : normal respiratory rhythm and effort [Auscultation Breath Sounds / Voice Sounds] : lungs were clear to auscultation bilaterally [Apical Impulse] : the apical impulse was normal [Heart Sounds] : normal S1 and S2 [Bowel Sounds] : normal bowel sounds [Abdomen Tenderness] : non-tender [Abdomen Soft] : soft [No Focal Deficits] : no focal deficits

## 2020-01-02 NOTE — END OF VISIT
[] : Resident [FreeTextEntry3] : Seen with [employer] Team\par 1. family very involved in care of pt; still with insomnia albeit trazadone - will d/c it\par 2. start low dose onlazepine at 2.5mg q HS for behavioral issues with her dementia ; may also help with sleep; observe\par 3. cont with sertraline for depression for now\par Again patient well dressed and well put together; here with son, daughter in law and HHA (which they have during day); night-time insomnia and behavior are a major issue for the family; counseled at length; seen here also by psych resident and CSW Meena Cleaning; they are looking for possible respite at [facility] for 2 weeks sometime soon - will get needed [employer] [facility] for this.

## 2020-01-02 NOTE — REVIEW OF SYSTEMS
[Confused] : confusion [Sleep Disturbances] : sleep disturbances [Negative] : Musculoskeletal [Suicidal] : not suicidal

## 2020-01-09 DIAGNOSIS — G30.9 ALZHEIMER'S DISEASE, UNSPECIFIED: ICD-10-CM

## 2020-01-09 DIAGNOSIS — F29 UNSPECIFIED PSYCHOSIS NOT DUE TO A SUBSTANCE OR KNOWN PHYSIOLOGICAL CONDITION: ICD-10-CM

## 2020-01-09 DIAGNOSIS — F03.91 UNSPECIFIED DEMENTIA WITH BEHAVIORAL DISTURBANCE: ICD-10-CM

## 2020-02-01 LAB
M TB IFN-G BLD-IMP: NEGATIVE
QUANTIFERON TB PLUS MITOGEN MINUS NIL: 2.28 IU/ML
QUANTIFERON TB PLUS NIL: 0.01 IU/ML
QUANTIFERON TB PLUS TB1 MINUS NIL: 0 IU/ML
QUANTIFERON TB PLUS TB2 MINUS NIL: 0 IU/ML

## 2020-03-06 ENCOUNTER — INPATIENT (INPATIENT)
Facility: HOSPITAL | Age: 85
LOS: 3 days | Discharge: HOME HEALTH | End: 2020-03-10
Attending: INTERNAL MEDICINE | Admitting: INTERNAL MEDICINE
Payer: MEDICARE

## 2020-03-06 VITALS
RESPIRATION RATE: 18 BRPM | OXYGEN SATURATION: 97 % | SYSTOLIC BLOOD PRESSURE: 190 MMHG | TEMPERATURE: 98 F | HEART RATE: 57 BPM | DIASTOLIC BLOOD PRESSURE: 84 MMHG

## 2020-03-06 LAB
ALBUMIN SERPL ELPH-MCNC: 4 G/DL — SIGNIFICANT CHANGE UP (ref 3.5–5.2)
ALP SERPL-CCNC: 100 U/L — SIGNIFICANT CHANGE UP (ref 30–115)
ALT FLD-CCNC: 14 U/L — SIGNIFICANT CHANGE UP (ref 0–41)
ANION GAP SERPL CALC-SCNC: 14 MMOL/L — SIGNIFICANT CHANGE UP (ref 7–14)
APTT BLD: 26.8 SEC — LOW (ref 27–39.2)
AST SERPL-CCNC: 26 U/L — SIGNIFICANT CHANGE UP (ref 0–41)
BASOPHILS # BLD AUTO: 0.05 K/UL — SIGNIFICANT CHANGE UP (ref 0–0.2)
BASOPHILS NFR BLD AUTO: 0.6 % — SIGNIFICANT CHANGE UP (ref 0–1)
BILIRUB SERPL-MCNC: 0.2 MG/DL — SIGNIFICANT CHANGE UP (ref 0.2–1.2)
BLD GP AB SCN SERPL QL: SIGNIFICANT CHANGE UP
BUN SERPL-MCNC: 29 MG/DL — HIGH (ref 10–20)
CALCIUM SERPL-MCNC: 9.2 MG/DL — SIGNIFICANT CHANGE UP (ref 8.5–10.1)
CHLORIDE SERPL-SCNC: 104 MMOL/L — SIGNIFICANT CHANGE UP (ref 98–110)
CO2 SERPL-SCNC: 22 MMOL/L — SIGNIFICANT CHANGE UP (ref 17–32)
CREAT SERPL-MCNC: 1.1 MG/DL — SIGNIFICANT CHANGE UP (ref 0.7–1.5)
EOSINOPHIL # BLD AUTO: 0.28 K/UL — SIGNIFICANT CHANGE UP (ref 0–0.7)
EOSINOPHIL NFR BLD AUTO: 3.6 % — SIGNIFICANT CHANGE UP (ref 0–8)
GLUCOSE SERPL-MCNC: 92 MG/DL — SIGNIFICANT CHANGE UP (ref 70–99)
HCT VFR BLD CALC: 32 % — LOW (ref 37–47)
HGB BLD-MCNC: 10.1 G/DL — LOW (ref 12–16)
IMM GRANULOCYTES NFR BLD AUTO: 0.4 % — HIGH (ref 0.1–0.3)
INR BLD: 0.95 RATIO — SIGNIFICANT CHANGE UP (ref 0.65–1.3)
LYMPHOCYTES # BLD AUTO: 1.18 K/UL — LOW (ref 1.2–3.4)
LYMPHOCYTES # BLD AUTO: 15 % — LOW (ref 20.5–51.1)
MCHC RBC-ENTMCNC: 30.1 PG — SIGNIFICANT CHANGE UP (ref 27–31)
MCHC RBC-ENTMCNC: 31.6 G/DL — LOW (ref 32–37)
MCV RBC AUTO: 95.2 FL — SIGNIFICANT CHANGE UP (ref 81–99)
MONOCYTES # BLD AUTO: 0.7 K/UL — HIGH (ref 0.1–0.6)
MONOCYTES NFR BLD AUTO: 8.9 % — SIGNIFICANT CHANGE UP (ref 1.7–9.3)
NEUTROPHILS # BLD AUTO: 5.64 K/UL — SIGNIFICANT CHANGE UP (ref 1.4–6.5)
NEUTROPHILS NFR BLD AUTO: 71.5 % — SIGNIFICANT CHANGE UP (ref 42.2–75.2)
NRBC # BLD: 0 /100 WBCS — SIGNIFICANT CHANGE UP (ref 0–0)
PLATELET # BLD AUTO: 287 K/UL — SIGNIFICANT CHANGE UP (ref 130–400)
POTASSIUM SERPL-MCNC: 4.7 MMOL/L — SIGNIFICANT CHANGE UP (ref 3.5–5)
POTASSIUM SERPL-SCNC: 4.7 MMOL/L — SIGNIFICANT CHANGE UP (ref 3.5–5)
PROT SERPL-MCNC: 7 G/DL — SIGNIFICANT CHANGE UP (ref 6–8)
PROTHROM AB SERPL-ACNC: 10.9 SEC — SIGNIFICANT CHANGE UP (ref 9.95–12.87)
RBC # BLD: 3.36 M/UL — LOW (ref 4.2–5.4)
RBC # FLD: 13.9 % — SIGNIFICANT CHANGE UP (ref 11.5–14.5)
SODIUM SERPL-SCNC: 140 MMOL/L — SIGNIFICANT CHANGE UP (ref 135–146)
WBC # BLD: 7.88 K/UL — SIGNIFICANT CHANGE UP (ref 4.8–10.8)
WBC # FLD AUTO: 7.88 K/UL — SIGNIFICANT CHANGE UP (ref 4.8–10.8)

## 2020-03-06 PROCEDURE — 70450 CT HEAD/BRAIN W/O DYE: CPT | Mod: 26

## 2020-03-06 PROCEDURE — 93010 ELECTROCARDIOGRAM REPORT: CPT

## 2020-03-06 PROCEDURE — 73562 X-RAY EXAM OF KNEE 3: CPT | Mod: 26,LT

## 2020-03-06 PROCEDURE — 71045 X-RAY EXAM CHEST 1 VIEW: CPT | Mod: 26

## 2020-03-06 PROCEDURE — 99285 EMERGENCY DEPT VISIT HI MDM: CPT

## 2020-03-06 PROCEDURE — 73700 CT LOWER EXTREMITY W/O DYE: CPT | Mod: 26,LT

## 2020-03-06 PROCEDURE — 72192 CT PELVIS W/O DYE: CPT | Mod: 26

## 2020-03-06 PROCEDURE — 72170 X-RAY EXAM OF PELVIS: CPT | Mod: 26

## 2020-03-06 PROCEDURE — 99221 1ST HOSP IP/OBS SF/LOW 40: CPT | Mod: AI,GC

## 2020-03-06 RX ORDER — MIDAZOLAM HYDROCHLORIDE 1 MG/ML
2 INJECTION, SOLUTION INTRAMUSCULAR; INTRAVENOUS ONCE
Refills: 0 | Status: DISCONTINUED | OUTPATIENT
Start: 2020-03-06 | End: 2020-03-06

## 2020-03-06 RX ORDER — MIDAZOLAM HYDROCHLORIDE 1 MG/ML
1 INJECTION, SOLUTION INTRAMUSCULAR; INTRAVENOUS ONCE
Refills: 0 | Status: DISCONTINUED | OUTPATIENT
Start: 2020-03-06 | End: 2020-03-06

## 2020-03-06 RX ADMIN — MIDAZOLAM HYDROCHLORIDE 1 MILLIGRAM(S): 1 INJECTION, SOLUTION INTRAMUSCULAR; INTRAVENOUS at 18:39

## 2020-03-06 RX ADMIN — MIDAZOLAM HYDROCHLORIDE 2 MILLIGRAM(S): 1 INJECTION, SOLUTION INTRAMUSCULAR; INTRAVENOUS at 17:40

## 2020-03-06 NOTE — ED PROVIDER NOTE - PHYSICAL EXAMINATION
CONSTITUTIONAL: Well-developed; well-nourished; in no acute distress, speaking in full sentences, moving all extremities  SKIN: warm, dry  HEAD: Normocephalic; atraumatic  EYES: PERRL, EOMI, no conjunctival erythema  ENT: No nasal discharge; airway clear, mucous membranes moist, no septal hematoma  NECK: Supple; non tender, FROM  CARD: +S1, S2 no murmurs, gallops, or rubs. Regular rate and rhythm. radial 2+, no chest wall tenderness or crepitus, no erythema, edema, ecchymosis  RESP: No wheezes, rales or rhonchi. CTABL  ABD: soft ntnd, no rebound, no guarding, no rigidity  BACK: no midline spinal tenderness, no step offs, no ecchymosis, edema or erythema  EXT: moves all extremities, No clubbing, cyanosis or edema, unable to ambulate  NEURO: Alert,  no focal deficits, speaking in full sentences, following commands. dementia, oriented at baseline  PSYCH: appropriate

## 2020-03-06 NOTE — ED PROVIDER NOTE - ATTENDING CONTRIBUTION TO CARE
ATTENDING NOTE: I personally evaluated the patient. I reviewed the Resident’s or Physician Assistant’s note (as assigned above), and agree with the findings and plan except as documented in my note. 89 y/o F with severe dementia, not oriented to self but alert and interactive, presents with family for medical evaluation. Family reports Pt has had multiple falls over the past 2 weeks with no head injury. Pt subsequently requires help with ambulation now and complains of pain in the L leg. Pt was seen at PMD and referred to ED for further workup. On exam: PT in NAD, no complaints, sitting in an upright position with knees hanging over bed. Cardiac- S1S2. Lungs- CTAB. Abdomen soft NTND. Extremities- No LE edema, No focal tenderness over joints, no ecchymosis. (+) Difficulty ambulating with L leg. Neuro- grossly intact, follows simple commands. Impression: Multiple falls. Plan: Head CT, basic labs, XR lower extremity to eval for fracture.

## 2020-03-06 NOTE — ED ADULT NURSE NOTE - TEMPLATE
"Pt noted to be restless, agitated. Tryng doors on units attempting to leave. Check in with patient who reports his anxiety is \"through the roof. I feel like I have PTSD or something\" Patient unsure if he has experienced any trauma. Pt noted to be whispering to himself. Offered PRN zyprexa for anxiety and he accepted. Will continue to monitor.   " Fall

## 2020-03-06 NOTE — ED PROVIDER NOTE - NS ED ROS FT
Constitutional: (-) fever  Eyes/ENT: (-) runny nose  Cardiovascular: (-) chest pain, (-) syncope  Respiratory: (-) cough, (-) shortness of breath  Gastrointestinal: (-) vomiting, (-) diarrhea, (-) abdominal pain  : (-) dysuria   Musculoskeletal: (-) back pain, (+) joint pain  Integumentary: (-) rash  Neurological: (-)loc  Allergic/Immunologic: (-) pruritus  Endocrine: (-) history of thyroid disease

## 2020-03-06 NOTE — ED ADULT TRIAGE NOTE - AS TEMP SITE
Mary Lou Lopez, VAHID                  oral

## 2020-03-06 NOTE — ED PROVIDER NOTE - CLINICAL SUMMARY MEDICAL DECISION MAKING FREE TEXT BOX
patient with dementia and unable to provide history, difficulty with gait, no focal tenderness on exam. we obtained xrays which showed no acute fx but given her osteoporosis proceeded to ct scan which also was negative for fx, she is admitted for difficulty ambulating and pain with ambulation.

## 2020-03-06 NOTE — ED PROVIDER NOTE - PROGRESS NOTE DETAILS
ATTENDING NOTE: I personally evaluated the patient. I reviewed the Resident’s or Physician Assistant’s note (as assigned above), and agree with the findings and plan except as documented in my note. 89 y/o F with severe dementia, not oriented to self but alert and interactive, presents with family for medical evaluation. Family reports Pt has had multiple falls over the past 2 weeks with no head injury. Pt subsequently requires help with ambulation now and complains of pain in the L leg. Pt was seen at PMD and referred to ED for further workup. On exam: PT in NAD, no complaints, sitting in an upright position with knees hanging over bed. Cardiac- S1S2. Lungs- CTAB. Abdomen soft NTND. Extremities- No LE edema, No focal tenderness over joints, no ecchymosis. (+) Difficulty ambulating with L leg. Neuro- grossly intact, follows simple commands. Impression: Multiple falls. Plan: Head CT, basic labs, XR lower extremity to eval for fracture.

## 2020-03-06 NOTE — ED ADULT NURSE NOTE - OBJECTIVE STATEMENT
pt brought in by aid states "she fell a few times this week r/t leg weakness" aid denies head trauma or loc. pt hx dementia.

## 2020-03-06 NOTE — ED PROVIDER NOTE - OBJECTIVE STATEMENT
89F with pmh of HTN, hypothyroidism, and dementia presents due to L leg pain and failure to ambulate since fall x3 2 days ago. PT has dementia and is unable to give coherent history but is accompanied by aid who states that she has had difficulty walking on L knee and notecard stating that she did not hit her head. No AC.

## 2020-03-07 DIAGNOSIS — Z96.643 PRESENCE OF ARTIFICIAL HIP JOINT, BILATERAL: Chronic | ICD-10-CM

## 2020-03-07 LAB
ALBUMIN SERPL ELPH-MCNC: 3.9 G/DL — SIGNIFICANT CHANGE UP (ref 3.5–5.2)
ALP SERPL-CCNC: 101 U/L — SIGNIFICANT CHANGE UP (ref 30–115)
ALT FLD-CCNC: 13 U/L — SIGNIFICANT CHANGE UP (ref 0–41)
ANION GAP SERPL CALC-SCNC: 13 MMOL/L — SIGNIFICANT CHANGE UP (ref 7–14)
APPEARANCE UR: CLEAR — SIGNIFICANT CHANGE UP
AST SERPL-CCNC: 21 U/L — SIGNIFICANT CHANGE UP (ref 0–41)
BASOPHILS # BLD AUTO: 0.05 K/UL — SIGNIFICANT CHANGE UP (ref 0–0.2)
BASOPHILS NFR BLD AUTO: 0.8 % — SIGNIFICANT CHANGE UP (ref 0–1)
BILIRUB SERPL-MCNC: 0.4 MG/DL — SIGNIFICANT CHANGE UP (ref 0.2–1.2)
BILIRUB UR-MCNC: NEGATIVE — SIGNIFICANT CHANGE UP
BUN SERPL-MCNC: 22 MG/DL — HIGH (ref 10–20)
CALCIUM SERPL-MCNC: 9.3 MG/DL — SIGNIFICANT CHANGE UP (ref 8.5–10.1)
CHLORIDE SERPL-SCNC: 108 MMOL/L — SIGNIFICANT CHANGE UP (ref 98–110)
CHOLEST SERPL-MCNC: 244 MG/DL — HIGH (ref 100–200)
CO2 SERPL-SCNC: 25 MMOL/L — SIGNIFICANT CHANGE UP (ref 17–32)
COLOR SPEC: YELLOW — SIGNIFICANT CHANGE UP
CREAT SERPL-MCNC: 0.8 MG/DL — SIGNIFICANT CHANGE UP (ref 0.7–1.5)
DIFF PNL FLD: NEGATIVE — SIGNIFICANT CHANGE UP
EOSINOPHIL # BLD AUTO: 0.1 K/UL — SIGNIFICANT CHANGE UP (ref 0–0.7)
EOSINOPHIL NFR BLD AUTO: 1.6 % — SIGNIFICANT CHANGE UP (ref 0–8)
ESTIMATED AVERAGE GLUCOSE: 114 MG/DL — SIGNIFICANT CHANGE UP (ref 68–114)
GLUCOSE SERPL-MCNC: 88 MG/DL — SIGNIFICANT CHANGE UP (ref 70–99)
GLUCOSE UR QL: NEGATIVE — SIGNIFICANT CHANGE UP
HBA1C BLD-MCNC: 5.6 % — SIGNIFICANT CHANGE UP (ref 4–5.6)
HCT VFR BLD CALC: 31.8 % — LOW (ref 37–47)
HDLC SERPL-MCNC: 76 MG/DL — SIGNIFICANT CHANGE UP
HGB BLD-MCNC: 10.5 G/DL — LOW (ref 12–16)
IMM GRANULOCYTES NFR BLD AUTO: 0.2 % — SIGNIFICANT CHANGE UP (ref 0.1–0.3)
KETONES UR-MCNC: NEGATIVE — SIGNIFICANT CHANGE UP
LACTATE SERPL-SCNC: 0.8 MMOL/L — SIGNIFICANT CHANGE UP (ref 0.7–2)
LEUKOCYTE ESTERASE UR-ACNC: NEGATIVE — SIGNIFICANT CHANGE UP
LIPID PNL WITH DIRECT LDL SERPL: 153 MG/DL — HIGH (ref 4–129)
LYMPHOCYTES # BLD AUTO: 0.82 K/UL — LOW (ref 1.2–3.4)
LYMPHOCYTES # BLD AUTO: 12.9 % — LOW (ref 20.5–51.1)
MAGNESIUM SERPL-MCNC: 2.2 MG/DL — SIGNIFICANT CHANGE UP (ref 1.8–2.4)
MCHC RBC-ENTMCNC: 30.7 PG — SIGNIFICANT CHANGE UP (ref 27–31)
MCHC RBC-ENTMCNC: 33 G/DL — SIGNIFICANT CHANGE UP (ref 32–37)
MCV RBC AUTO: 93 FL — SIGNIFICANT CHANGE UP (ref 81–99)
MONOCYTES # BLD AUTO: 0.52 K/UL — SIGNIFICANT CHANGE UP (ref 0.1–0.6)
MONOCYTES NFR BLD AUTO: 8.2 % — SIGNIFICANT CHANGE UP (ref 1.7–9.3)
NEUTROPHILS # BLD AUTO: 4.86 K/UL — SIGNIFICANT CHANGE UP (ref 1.4–6.5)
NEUTROPHILS NFR BLD AUTO: 76.3 % — HIGH (ref 42.2–75.2)
NITRITE UR-MCNC: NEGATIVE — SIGNIFICANT CHANGE UP
NRBC # BLD: 0 /100 WBCS — SIGNIFICANT CHANGE UP (ref 0–0)
PH UR: 7 — SIGNIFICANT CHANGE UP (ref 5–8)
PHOSPHATE SERPL-MCNC: 3.9 MG/DL — SIGNIFICANT CHANGE UP (ref 2.1–4.9)
PLATELET # BLD AUTO: 275 K/UL — SIGNIFICANT CHANGE UP (ref 130–400)
POTASSIUM SERPL-MCNC: 4.5 MMOL/L — SIGNIFICANT CHANGE UP (ref 3.5–5)
POTASSIUM SERPL-SCNC: 4.5 MMOL/L — SIGNIFICANT CHANGE UP (ref 3.5–5)
PROT SERPL-MCNC: 6.4 G/DL — SIGNIFICANT CHANGE UP (ref 6–8)
PROT UR-MCNC: NEGATIVE — SIGNIFICANT CHANGE UP
RBC # BLD: 3.42 M/UL — LOW (ref 4.2–5.4)
RBC # FLD: 13.7 % — SIGNIFICANT CHANGE UP (ref 11.5–14.5)
SODIUM SERPL-SCNC: 146 MMOL/L — SIGNIFICANT CHANGE UP (ref 135–146)
SP GR SPEC: 1.01 — SIGNIFICANT CHANGE UP (ref 1.01–1.02)
TOTAL CHOLESTEROL/HDL RATIO MEASUREMENT: 3.2 RATIO — LOW (ref 4–5.5)
TRIGL SERPL-MCNC: 60 MG/DL — SIGNIFICANT CHANGE UP (ref 10–149)
UROBILINOGEN FLD QL: SIGNIFICANT CHANGE UP
WBC # BLD: 6.36 K/UL — SIGNIFICANT CHANGE UP (ref 4.8–10.8)
WBC # FLD AUTO: 6.36 K/UL — SIGNIFICANT CHANGE UP (ref 4.8–10.8)

## 2020-03-07 PROCEDURE — 93970 EXTREMITY STUDY: CPT | Mod: 26

## 2020-03-07 RX ORDER — LOSARTAN POTASSIUM 100 MG/1
25 TABLET, FILM COATED ORAL DAILY
Refills: 0 | Status: DISCONTINUED | OUTPATIENT
Start: 2020-03-07 | End: 2020-03-10

## 2020-03-07 RX ORDER — SERTRALINE 25 MG/1
25 TABLET, FILM COATED ORAL DAILY
Refills: 0 | Status: DISCONTINUED | OUTPATIENT
Start: 2020-03-07 | End: 2020-03-10

## 2020-03-07 RX ORDER — TRAZODONE HCL 50 MG
50 TABLET ORAL AT BEDTIME
Refills: 0 | Status: DISCONTINUED | OUTPATIENT
Start: 2020-03-07 | End: 2020-03-10

## 2020-03-07 RX ORDER — SENNA PLUS 8.6 MG/1
2 TABLET ORAL AT BEDTIME
Refills: 0 | Status: DISCONTINUED | OUTPATIENT
Start: 2020-03-07 | End: 2020-03-10

## 2020-03-07 RX ORDER — TRAZODONE HCL 50 MG
50 TABLET ORAL AT BEDTIME
Refills: 0 | Status: DISCONTINUED | OUTPATIENT
Start: 2020-03-07 | End: 2020-03-07

## 2020-03-07 RX ORDER — LEVOTHYROXINE SODIUM 125 MCG
50 TABLET ORAL DAILY
Refills: 0 | Status: DISCONTINUED | OUTPATIENT
Start: 2020-03-07 | End: 2020-03-10

## 2020-03-07 RX ORDER — APIXABAN 2.5 MG/1
10 TABLET, FILM COATED ORAL
Refills: 0 | Status: DISCONTINUED | OUTPATIENT
Start: 2020-03-07 | End: 2020-03-10

## 2020-03-07 RX ORDER — LURASIDONE HYDROCHLORIDE 40 MG/1
40 TABLET ORAL DAILY
Refills: 0 | Status: DISCONTINUED | OUTPATIENT
Start: 2020-03-07 | End: 2020-03-10

## 2020-03-07 RX ORDER — ENOXAPARIN SODIUM 100 MG/ML
30 INJECTION SUBCUTANEOUS DAILY
Refills: 0 | Status: DISCONTINUED | OUTPATIENT
Start: 2020-03-07 | End: 2020-03-07

## 2020-03-07 RX ADMIN — Medication 50 MILLIGRAM(S): at 21:23

## 2020-03-07 RX ADMIN — ENOXAPARIN SODIUM 30 MILLIGRAM(S): 100 INJECTION SUBCUTANEOUS at 11:54

## 2020-03-07 RX ADMIN — SERTRALINE 25 MILLIGRAM(S): 25 TABLET, FILM COATED ORAL at 11:54

## 2020-03-07 RX ADMIN — APIXABAN 10 MILLIGRAM(S): 2.5 TABLET, FILM COATED ORAL at 17:52

## 2020-03-07 RX ADMIN — Medication 50 MILLIGRAM(S): at 03:25

## 2020-03-07 RX ADMIN — Medication 50 MICROGRAM(S): at 05:25

## 2020-03-07 RX ADMIN — LURASIDONE HYDROCHLORIDE 40 MILLIGRAM(S): 40 TABLET ORAL at 11:55

## 2020-03-07 RX ADMIN — Medication 1 TABLET(S): at 11:54

## 2020-03-07 NOTE — H&P ADULT - ATTENDING COMMENTS
HPI:  89F with pmhx of HTN, hypothyroidism,  severe dementia, not oriented to self but alert and interactive, presenting due to multiple falls and LE swelling. Family reports Pt has had multiple falls over the past 2-3 weeks with no head injury. During this time her legs have become more edematous, causing her to collapse straight down. She usually walks with cane and uses railing in house. She has been trying to get out up of bed but is unable to do so.   Pt subsequently requires more  help with ambulation now.  Family has HHA during the day but needs assistance at night.  Patient was seen at PMD and referred to ED for further workup. presents due to L leg pain and failure to ambulate since fall x3 2 days ago. Patient has severe dementia at baseline and unable to communicate well, called son, HCP and received history from him.      In the ED she had trauma workup which was negative.     T(C): 36.1 (03-07-20 @ 00:44), Max: 36.7 (03-06-20 @ 13:39)  HR: 68 (03-07-20 @ 00:44) (57 - 69)  BP: 160/68 (03-07-20 @ 00:44) (160/68 - 190/84)  RR: 18 (03-07-20 @ 00:44) (18 - 18)  SpO2: 100% (03-06-20 @ 22:10) (95% - 100%) (07 Mar 2020 01:12)    REVIEW OF SYSTEMS: see cc/HPI  CONSTITUTIONAL: (+) Generalized weakness, NO fevers or chills  EYES/ENT: No visual changes;  No vertigo or throat pain   NECK: No pain or stiffness  RESPIRATORY: No cough, wheezing, hemoptysis; No shortness of breath  CARDIOVASCULAR: No chest pain or palpitations  GASTROINTESTINAL: No abdominal or epigastric pain. No nausea, vomiting, or hematemesis; No diarrhea or constipation. No melena or hematochezia.  GENITOURINARY: No dysuria, frequency or hematuria  NEUROLOGICAL: No numbness or weakness  SKIN: No itching, rashes    Physical Exam:  General: WN/WD NAD  Neurology: A, A O x 1 , nonfocal, follows commands, rambling, forgetful  Eyes: PERRLA/ EOMI  ENT/Neck: Neck supple, trachea midline, No JVD  Respiratory: CTA B/L, No wheezing, rales, rhonchi  CV: Normal rate regular rhythm, S1S2, no murmurs, rubs or gallops  Abdominal: Soft, NT, ND +BS,   Extremities: +1 pitting edema, + peripheral pulses  Skin: No Rashes, Hematoma, Ecchymosis  Incisions: n/a  Tubes: n/a    A/p  Multiple fall / generalized weakness -negative trauma w/u  Advanced dementia  -Fall precautions  -PT / Rehab - may need placement vs. SNF  -check UA/ TSH     HTN - elevated BP  -RESTART home Rx - if unsure can start w/ ACEI or ARB ( would avoid diuretic (dehydration) and Amlodipine (dizziness SE and increased falling)    Hypothyroidism  -supplement and check TSH     Pedal edema   -check venous duplex    DVT prophylaxis

## 2020-03-07 NOTE — H&P ADULT - NSHPPHYSICALEXAM_GEN_ALL_CORE
GENERAL: NAD, Cachectic, confused   PSYCH: AAOx1 (BL)   HEENT:  Atraumatic, Normocephalic. EOMI, PERRLA, conjunctiva clear, sclera white, No JVD  PULMONARY: Clear to auscultation bilaterally; No wheeze  CARDIOVASCULAR: Regular rate and rhythm; No murmurs, rubs, or gallops  GASTROINTESTINAL: Soft, Nontender, Nondistended; Bowel sounds present  MUSCULOSKELETAL:  2+ Peripheral Pulses, No clubbing, cyanosis, +2 non pitting LE edema   NEUROLOGY: Unable to assess   SKIN: No rashes or lesions

## 2020-03-07 NOTE — PATIENT PROFILE ADULT - NSPRESCRUSEDDRG_GEN_A_NUR
unable to assess pt confused Localized Dermabrasion Text: The patient was draped in routine manner.  Localized dermabrasion using 3 x 17 mm wire brush was performed in routine manner to papillary dermis. This spot dermabrasion is being performed to complete skin cancer reconstruction. It also will eliminate the other sun damaged precancerous cells that are known to be part of the regional effect of a lifetime's worth of sun exposure. This localized dermabrasion is therapeutic and should not be considered cosmetic in any regard. Localized Dermabrasion With Wire Brush Text: The patient was draped in routine manner.  Localized dermabrasion using 3 x 17 mm wire brush was performed in routine manner to papillary dermis. This spot dermabrasion is being performed to complete skin cancer reconstruction. It also will eliminate the other sun damaged precancerous cells that are known to be part of the regional effect of a lifetime's worth of sun exposure. This localized dermabrasion is therapeutic and should not be considered cosmetic in any regard.

## 2020-03-07 NOTE — H&P ADULT - NSHPLABSRESULTS_GEN_ALL_CORE
=======================================================    Labs:  03-06    140  |  104  |  29<H>  ----------------------------<  92  4.7   |  22  |  1.1    Ca    9.2      06 Mar 2020 15:00    TPro  7.0  /  Alb  4.0  /  TBili  0.2  /  DBili  x   /  AST  26  /  ALT  14  /  AlkPhos  100  03-06                          10.1   7.88  )-----------( 287      ( 06 Mar 2020 15:00 )             32.0       PT/INR - ( 06 Mar 2020 15:00 )   PT: 10.90 sec;   INR: 0.95 ratio         PTT - ( 06 Mar 2020 15:00 )  PTT:26.8 sec    LIVER FUNCTIONS - ( 06 Mar 2020 15:00 )  Alb: 4.0 g/dL / Pro: 7.0 g/dL / ALK PHOS: 100 U/L / ALT: 14 U/L / AST: 26 U/L / GGT: x            CT BRAIN            PROCEDURE DATE:  03/06/2020          IMPRESSION:   1. No evidence of acute/traumatic intracranial pathology.  2. Moderate microvascular ischemic changes.        CT KNEE ONLY LT            PROCEDURE DATE:  03/06/2020   IMPRESSION:  No evidence of acute displaced fracture or dislocation.     CT PELVIS ONLY            PROCEDURE DATE:  03/06/2020      IMPRESSION:  1. No evidence of acute fracture or dislocation.  2. Additional stable findings as detailed above.

## 2020-03-07 NOTE — H&P ADULT - ASSESSMENT
89F with pmhx of HTN, hypothyroidism,  severe dementia, not oriented to self but alert and interactive, presenting due to multiple falls and LE swelling.    #) Multiple falls in setting of severe dementia  -No sign of acute infection, with  LE edema  -AAOx1 at baseline, confused  -Had multiple non traumatic falls in last 2-3 weeks  -Trauma workup negative  -Will need PT/Rehab    #) LE edema  -per son has appeared in last 2-3 weeks  -will get duplex     #) HTN  -not on home meds, BP elevated, re-check and start agent if necessary in AM  -BP x 24 hours: BP:  (160/68 - 190/84)    #) Hypothyroidism  -cont home meds    DVT ppx: Lovenox     GI ppx:  Not indicated     Diet:  S/S, 1:1, dysphagia diet     Activity: Inc as tolerated     CHG    Dispo:  Likely SNF, social work for increased HHA

## 2020-03-07 NOTE — H&P ADULT - NSHPSOCIALHISTORY_GEN_ALL_CORE
Lives with: (  ) alone  ( x ) children   (  ) spouse   (  ) parents  (  ) other  Recent Travel: No recent travel  Substance Use (street drugs): ( x ) never used  (  ) other:  Tobacco Usage:  ( x  ) never smoked   (   ) former smoker   (   ) current smoker  (     ) pack year  Alcohol Usage: None

## 2020-03-07 NOTE — H&P ADULT - HISTORY OF PRESENT ILLNESS
89F with pmh of HTN, hypothyroidism,  severe dementia, not oriented to self but alert and interactive, presents with family for medical evaluation. Family reports Pt has had multiple falls over the past 2 weeks with no head injury. Pt subsequently requires help with ambulation now and complains of pain in the L leg. Pt was seen at PMD and referred to ED for further workup. presents due to L leg pain and failure to ambulate since fall x3 2 days ago. PT has dementia and is unable to give coherent history but is accompanied by aid who states that she has had difficulty walking on L knee and notecard stating that she did not hit her head. No AC.       Impression: Multiple falls. Plan: Head CT, basic labs, XR lower extremity to eval for fracture. 89F with pmh of HTN, hypothyroidism,  severe dementia, not oriented to self but alert and interactive, presents with family for medical evaluation. Family reports Pt has had multiple falls over the past 2-3 weeks with no head injury.  legs edematous, collapses straight down. Usually walks with cane and uses railing in house. She tries to get up but unable to do so Pt subsequently requires help with ambulation now and complains of pain in the L leg. Pt was seen at PMD and referred to ED for further workup. presents due to L leg pain and failure to ambulate since fall x3 2 days ago. PT has dementia and is unable to give coherent history but is accompanied by aid who states that she has had difficulty walking on L knee and notecard stating that she did not hit her head. No AC.  Patient saw Dr. Tejada about 2-3 weeks ago, started Latuda 40 mg and stopped Olanzapine at that time.      Impression: Multiple falls. Plan: Head CT, basic labs, XR lower extremity to eval for fracture.    Sertraline 25 mg in AM  Trazadone 50 mg night   Olanzapine Wednesday/ stopped thursday  Levothyroxine 50 89F with pmhx of HTN, hypothyroidism,  severe dementia, not oriented to self but alert and interactive, presenting due to multiple falls and LE swelling. Family reports Pt has had multiple falls over the past 2-3 weeks with no head injury. During this time her legs have become more edematous, causing her to collapse straight down. She usually walks with cane and uses railing in house. She has been trying to get out up of bed but is unable to do so.   Pt subsequently requires more  help with ambulation now.  Family has HHA during the day but needs assistance at night.  Patient was seen at PMD and referred to ED for further workup. presents due to L leg pain and failure to ambulate since fall x3 2 days ago. Patient has severe dementia at baseline and unable to communicate well, called son, HCP and received history from him.      In the ED she had trauma workup which was negative.     T(C): 36.1 (03-07-20 @ 00:44), Max: 36.7 (03-06-20 @ 13:39)  HR: 68 (03-07-20 @ 00:44) (57 - 69)  BP: 160/68 (03-07-20 @ 00:44) (160/68 - 190/84)  RR: 18 (03-07-20 @ 00:44) (18 - 18)  SpO2: 100% (03-06-20 @ 22:10) (95% - 100%)

## 2020-03-07 NOTE — CHART NOTE - NSCHARTNOTEFT_GEN_A_CORE
Called by a resident with results of US duplex - B/l DVT.  I spoke to son Ron on phone, no contraindications for AC, no Hx of bleeding. Will start Eliquis 10 mg BID for 7 days then will change to 5 mg BID.

## 2020-03-08 LAB
ANION GAP SERPL CALC-SCNC: 10 MMOL/L — SIGNIFICANT CHANGE UP (ref 7–14)
BASOPHILS # BLD AUTO: 0.06 K/UL — SIGNIFICANT CHANGE UP (ref 0–0.2)
BASOPHILS NFR BLD AUTO: 1 % — SIGNIFICANT CHANGE UP (ref 0–1)
BUN SERPL-MCNC: 24 MG/DL — HIGH (ref 10–20)
CALCIUM SERPL-MCNC: 9.3 MG/DL — SIGNIFICANT CHANGE UP (ref 8.5–10.1)
CHLORIDE SERPL-SCNC: 105 MMOL/L — SIGNIFICANT CHANGE UP (ref 98–110)
CO2 SERPL-SCNC: 28 MMOL/L — SIGNIFICANT CHANGE UP (ref 17–32)
CREAT SERPL-MCNC: 1.2 MG/DL — SIGNIFICANT CHANGE UP (ref 0.7–1.5)
EOSINOPHIL # BLD AUTO: 0.28 K/UL — SIGNIFICANT CHANGE UP (ref 0–0.7)
EOSINOPHIL NFR BLD AUTO: 4.6 % — SIGNIFICANT CHANGE UP (ref 0–8)
GLUCOSE SERPL-MCNC: 96 MG/DL — SIGNIFICANT CHANGE UP (ref 70–99)
HCT VFR BLD CALC: 32.2 % — LOW (ref 37–47)
HGB BLD-MCNC: 10.4 G/DL — LOW (ref 12–16)
IMM GRANULOCYTES NFR BLD AUTO: 0.3 % — SIGNIFICANT CHANGE UP (ref 0.1–0.3)
LYMPHOCYTES # BLD AUTO: 0.99 K/UL — LOW (ref 1.2–3.4)
LYMPHOCYTES # BLD AUTO: 16.4 % — LOW (ref 20.5–51.1)
MAGNESIUM SERPL-MCNC: 2 MG/DL — SIGNIFICANT CHANGE UP (ref 1.8–2.4)
MCHC RBC-ENTMCNC: 30.6 PG — SIGNIFICANT CHANGE UP (ref 27–31)
MCHC RBC-ENTMCNC: 32.3 G/DL — SIGNIFICANT CHANGE UP (ref 32–37)
MCV RBC AUTO: 94.7 FL — SIGNIFICANT CHANGE UP (ref 81–99)
MONOCYTES # BLD AUTO: 0.59 K/UL — SIGNIFICANT CHANGE UP (ref 0.1–0.6)
MONOCYTES NFR BLD AUTO: 9.8 % — HIGH (ref 1.7–9.3)
NEUTROPHILS # BLD AUTO: 4.1 K/UL — SIGNIFICANT CHANGE UP (ref 1.4–6.5)
NEUTROPHILS NFR BLD AUTO: 67.9 % — SIGNIFICANT CHANGE UP (ref 42.2–75.2)
NRBC # BLD: 0 /100 WBCS — SIGNIFICANT CHANGE UP (ref 0–0)
PLATELET # BLD AUTO: 283 K/UL — SIGNIFICANT CHANGE UP (ref 130–400)
POTASSIUM SERPL-MCNC: 4.4 MMOL/L — SIGNIFICANT CHANGE UP (ref 3.5–5)
POTASSIUM SERPL-SCNC: 4.4 MMOL/L — SIGNIFICANT CHANGE UP (ref 3.5–5)
RBC # BLD: 3.4 M/UL — LOW (ref 4.2–5.4)
RBC # FLD: 13.8 % — SIGNIFICANT CHANGE UP (ref 11.5–14.5)
SODIUM SERPL-SCNC: 143 MMOL/L — SIGNIFICANT CHANGE UP (ref 135–146)
WBC # BLD: 6.04 K/UL — SIGNIFICANT CHANGE UP (ref 4.8–10.8)
WBC # FLD AUTO: 6.04 K/UL — SIGNIFICANT CHANGE UP (ref 4.8–10.8)

## 2020-03-08 PROCEDURE — 99233 SBSQ HOSP IP/OBS HIGH 50: CPT

## 2020-03-08 RX ADMIN — LURASIDONE HYDROCHLORIDE 40 MILLIGRAM(S): 40 TABLET ORAL at 17:13

## 2020-03-08 RX ADMIN — LOSARTAN POTASSIUM 25 MILLIGRAM(S): 100 TABLET, FILM COATED ORAL at 05:48

## 2020-03-08 RX ADMIN — APIXABAN 10 MILLIGRAM(S): 2.5 TABLET, FILM COATED ORAL at 05:47

## 2020-03-08 RX ADMIN — APIXABAN 10 MILLIGRAM(S): 2.5 TABLET, FILM COATED ORAL at 17:13

## 2020-03-08 RX ADMIN — Medication 50 MILLIGRAM(S): at 21:32

## 2020-03-08 RX ADMIN — Medication 1 TABLET(S): at 11:57

## 2020-03-08 RX ADMIN — Medication 50 MICROGRAM(S): at 05:48

## 2020-03-08 RX ADMIN — SERTRALINE 25 MILLIGRAM(S): 25 TABLET, FILM COATED ORAL at 11:57

## 2020-03-08 NOTE — PROGRESS NOTE ADULT - SUBJECTIVE AND OBJECTIVE BOX
MARLENE BOO    Patient is a 89y old  Female who presents with a chief complaint of PMD: [place]  Psych: Marlon (07 Mar 2020 01:12)    INTERVAL HPI/OVERNIGHT EVENTS: no events overnight, aide at bedside, pt does not have complaints.     ROS: All ROS negative except LE pain    PHYSICAL EXAM:  T(C): 36.2, Max: 36.3 (-20 @ 20:04)  HR: 92 (57 - 92)  BP: 127/60 (127/60 - 168/79)  RR: 18 (18 - 18)  SpO2: --    GENERAL: NAD  NECK: Supple, No JVD  PULMONARY/CHEST: No rales, rhonchi, wheezing  CARDIOVASC: Regular rate and rhythm; No murmurs  GI/ABDOMEN: Soft, Nontender, Nondistended; Bowel sounds present  EXTREMITIES: b/l LE edema  SKIN: No rashes or lesions  NERVOUS SYSTEM:  Alert & Oriented X1, no focal deficit     LABS:                        10.4   6.04  )-----------( 283      ( 08 Mar 2020 11:50 )             32.2     03-08    143  |  105  |  24<H>  ----------------------------<  96  4.4   |  28  |  1.2    Ca    9.3      08 Mar 2020 11:50  Phos  3.9     03-07  Mg     2.0     03-08    TPro  6.4  /  Alb  3.9  /  TBili  0.4  /  DBili  x   /  AST  21  /  ALT  13  /  AlkPhos  101  03-07      Urinalysis Basic - ( 07 Mar 2020 19:00 )    Color: Yellow / Appearance: Clear / S.012 / pH: x  Gluc: x / Ketone: Negative  / Bili: Negative / Urobili: <2 mg/dL   Blood: x / Protein: Negative / Nitrite: Negative   Leuk Esterase: Negative / RBC: x / WBC x   Sq Epi: x / Non Sq Epi: x / Bacteria: x    < from: [facility] Duplex Lower Ext Vein Scan, Bilat (20 @ 13:55) >  Acute right femoral and popliteal vein DVT. Right gastrocnemius vein thrombosis.    Acute left popliteal vein DVT.    < end of copied text >      MEDICATIONS  (STANDING):  apixaban 10 milliGRAM(s) Oral two times a day  levothyroxine 50 MICROGram(s) Oral daily  losartan 25 milliGRAM(s) Oral daily  lurasidone 40 milliGRAM(s) Oral daily  multivitamin 1 Tablet(s) Oral daily  sertraline 25 milliGRAM(s) Oral daily  traZODone 50 milliGRAM(s) Oral at bedtime    MEDICATIONS  (PRN):  senna 2 Tablet(s) Oral at bedtime PRN Constipation

## 2020-03-08 NOTE — PROGRESS NOTE ADULT - ASSESSMENT
89F with pmhx of HTN, hypothyroidism,  severe dementia, not oriented to self but alert and interactive, presenting due to multiple falls and LE swelling.    # Multiple fall, leg pain due to acute DVT   - negative trauma work up  - US duplex positive for acute DVT  - started Eliquis 10 mg BID for 7 dys, then 5 mg BID    # Advanced dementia  - Fall precautions  - PT / Rehab - may need placement vs. SNF  - UA negative   - check TSH     # HTN - continue home meds    # Hypothyroidism: continue home dose of levothyroxine.     DVT prophylaxis Eliquis .     #Progress Note Handoff  Pending  Consults: PT/Rehab  Tests: TSH in AM  Family Discussion: spoke to son Ron, he wants his mom to go for STR  Future Disposition: STR

## 2020-03-09 ENCOUNTER — TRANSCRIPTION ENCOUNTER (OUTPATIENT)
Age: 85
End: 2020-03-09

## 2020-03-09 LAB
ANION GAP SERPL CALC-SCNC: 11 MMOL/L — SIGNIFICANT CHANGE UP (ref 7–14)
BUN SERPL-MCNC: 29 MG/DL — HIGH (ref 10–20)
CALCIUM SERPL-MCNC: 9.3 MG/DL — SIGNIFICANT CHANGE UP (ref 8.5–10.1)
CHLORIDE SERPL-SCNC: 102 MMOL/L — SIGNIFICANT CHANGE UP (ref 98–110)
CO2 SERPL-SCNC: 28 MMOL/L — SIGNIFICANT CHANGE UP (ref 17–32)
CREAT SERPL-MCNC: 1.2 MG/DL — SIGNIFICANT CHANGE UP (ref 0.7–1.5)
GLUCOSE SERPL-MCNC: 74 MG/DL — SIGNIFICANT CHANGE UP (ref 70–99)
POTASSIUM SERPL-MCNC: 4.4 MMOL/L — SIGNIFICANT CHANGE UP (ref 3.5–5)
POTASSIUM SERPL-SCNC: 4.4 MMOL/L — SIGNIFICANT CHANGE UP (ref 3.5–5)
SODIUM SERPL-SCNC: 141 MMOL/L — SIGNIFICANT CHANGE UP (ref 135–146)

## 2020-03-09 PROCEDURE — 99232 SBSQ HOSP IP/OBS MODERATE 35: CPT

## 2020-03-09 RX ADMIN — APIXABAN 10 MILLIGRAM(S): 2.5 TABLET, FILM COATED ORAL at 17:07

## 2020-03-09 RX ADMIN — SERTRALINE 25 MILLIGRAM(S): 25 TABLET, FILM COATED ORAL at 11:34

## 2020-03-09 RX ADMIN — Medication 50 MICROGRAM(S): at 06:06

## 2020-03-09 RX ADMIN — Medication 50 MILLIGRAM(S): at 21:40

## 2020-03-09 RX ADMIN — LURASIDONE HYDROCHLORIDE 40 MILLIGRAM(S): 40 TABLET ORAL at 11:36

## 2020-03-09 RX ADMIN — LOSARTAN POTASSIUM 25 MILLIGRAM(S): 100 TABLET, FILM COATED ORAL at 06:06

## 2020-03-09 RX ADMIN — Medication 1 TABLET(S): at 11:34

## 2020-03-09 RX ADMIN — APIXABAN 10 MILLIGRAM(S): 2.5 TABLET, FILM COATED ORAL at 06:07

## 2020-03-09 NOTE — DISCHARGE NOTE NURSING/CASE MANAGEMENT/SOCIAL WORK - PATIENT PORTAL LINK FT
You can access the FollowMyHealth Patient Portal offered by [facility] by registering at the following website: [email or web address]. By joining [employer]’s FollowMyHealth portal, you will also be able to view your health information using other applications (apps) compatible with our system.

## 2020-03-09 NOTE — CONSULT NOTE ADULT - SUBJECTIVE AND OBJECTIVE BOX
HPI:  89F with pmhx of HTN, hypothyroidism,  severe dementia, not oriented to self but alert and interactive, presenting due to multiple falls and LE swelling. Family reports Pt has had multiple falls over the past 2-3 weeks with no head injury. During this time her legs have become more edematous, causing her to collapse straight down. She usually walks with cane and uses railing in house. She has been trying to get out up of bed but is unable to do so.   Pt subsequently requires more  help with ambulation now.  Family has HHA during the day but needs assistance at night.  Patient was seen at PMD and referred to ED for further workup. presents due to L leg pain and failure to ambulate since fall x3 2 days ago. Patient has severe dementia at baseline and unable to communicate well, called son, HCP and received history from him.      In the ED she had trauma workup which was negative.     T(C): 36.1 (20 @ 00:44), Max: 36.7 (20 @ 13:39)  HR: 68 (20 @ 00:44) (57 - 69)  BP: 160/68 (20 @ 00:44) (160/68 - 190/84)  RR: 18 (20 @ 00:44) (18 - 18)  SpO2: 100% (20 @ 22:10) (95% - 100%) (07 Mar 2020 01:12)      PAST MEDICAL & SURGICAL HISTORY:  Dementia  H/O bilateral hip replacements      Hospital Course:  She is deconditioned and weak. she amb 5 ft with a walker with mod assist.  TODAY'S SUBJECTIVE & REVIEW OF SYMPTOMS:     Constitutional WNL   Cardio WNL   Resp WNL   GI WNL  Heme WNL  Endo WNL  Skin WNL  MSK WNL  Neuro WNL  Cognitive severe dementia  Psych severe dementia      MEDICATIONS  (STANDING):  apixaban 10 milliGRAM(s) Oral two times a day  levothyroxine 50 MICROGram(s) Oral daily  losartan 25 milliGRAM(s) Oral daily  lurasidone 40 milliGRAM(s) Oral daily  multivitamin 1 Tablet(s) Oral daily  sertraline 25 milliGRAM(s) Oral daily  traZODone 50 milliGRAM(s) Oral at bedtime    MEDICATIONS  (PRN):  senna 2 Tablet(s) Oral at bedtime PRN Constipation      FAMILY HISTORY:  No pertinent family history in first degree relatives      Allergies    sulfonamides (Other)    Intolerances        SOCIAL HISTORY:    [  ] Etoh  [  ] Smoking  [  ] Substance abuse     Home Environment:  [  ] Home Alone  [  ] Lives with Family  [  ] Home Health Aid    Dwelling:  [  ] Apartment  [  ] Private House  [  ] Adult Home  [  ] Skilled Nursing Facility      [  ] Short Term  [  ] Long Term  [  ] Stairs       Elevator [  ]    FUNCTIONAL STATUS PTA: (Check all that apply)  Ambulation: [x   ]Independent    [  ] Dependent     [  ] Non-Ambulatory  Assistive Device: [  ] SA Cane  [  ]  Q Cane  [  ] Walker  [  ]  Wheelchair  ADL : [  ] Independent  [  ]  Dependent       Vital Signs Last 24 Hrs  T(C): 36.2 (09 Mar 2020 12:23), Max: 37.1 (08 Mar 2020 19:42)  T(F): 97.1 (09 Mar 2020 12:23), Max: 98.7 (08 Mar 2020 19:42)  HR: 85 (09 Mar 2020 12:23) (85 - 94)  BP: 109/51 (09 Mar 2020 12:23) (109/51 - 139/70)  BP(mean): --  RR: 16 (09 Mar 2020 12:23) (16 - 18)  SpO2: --      PHYSICAL EXAM: Alert & Oriented X1  GENERAL: NAD, well-groomed, well-developed  HEAD:  Atraumatic, Normocephalic  EYES: EOMI, PERRLA, conjunctiva and sclera clear  NECK: Supple, No JVD, Normal thyroid  CHEST/LUNG: Clear bilaterally; No rales, rhonchi, wheezing, or rubs  HEART: Regular rate and rhythm; No murmurs, rubs, or gallops  ABDOMEN: Soft, Nontender, Nondistended; Bowel sounds present  EXTREMITIES:  2+ Peripheral Pulses, No clubbing, cyanosis, or edema    NERVOUS SYSTEM:  Cranial Nerves 2-12 intact [  ] Abnormal  [  ]  ROM: WFL all extremities [  ]  Abnormal [  ]  Motor Strength: WFL all extremities  [  ]  Abnormal [ x ]  5-/5 LE's  Sensation: intact to light touch [  ] Abnormal [  ]  Reflexes: Symmetric [  ]  Abnormal [  ]    FUNCTIONAL STATUS:  Bed Mobility: Independent [  ]  Supervision [  ]  Needs Assistance [  ]  N/A [  ]  Transfers: Independent [  ]  Supervision [  ]  Needs Assistance [  ]  N/A [  ]   Ambulation: Independent [  ]  Supervision [  ]  Needs Assistance [  ]  N/A [  ]  ADL: Independent [  ] Requires Assistance [  ] N/A [  ]      LABS:                        10.4   6.04  )-----------( 283      ( 08 Mar 2020 11:50 )             32.2     03-09    141  |  102  |  29<H>  ----------------------------<  74  4.4   |  28  |  1.2    Ca    9.3      09 Mar 2020 07:44  Mg     2.0     03-08        Urinalysis Basic - ( 07 Mar 2020 19:00 )    Color: Yellow / Appearance: Clear / S.012 / pH: x  Gluc: x / Ketone: Negative  / Bili: Negative / Urobili: <2 mg/dL   Blood: x / Protein: Negative / Nitrite: Negative   Leuk Esterase: Negative / RBC: x / WBC x   Sq Epi: x / Non Sq Epi: x / Bacteria: x        RADIOLOGY & ADDITIONAL STUDIES:    Assesment:

## 2020-03-09 NOTE — PHYSICAL THERAPY INITIAL EVALUATION ADULT - LIVES WITH, PROFILE
Unable to obtain home history 2* to pt's cognitive status, YUSUF at b/s states she takes care of the pt 3 days a week during the day

## 2020-03-09 NOTE — CONSULT NOTE ADULT - ASSESSMENT
IMPRESSION: Rehab of   debility, dementia     PRECAUTIONS: [ x ] Cardiac  [  ] Respiratory  [  ] Seizures [  ] Contact Isolation  [  ] Droplet Isolation  [  ] Other    Weight Bearing Status:     RECOMMENDATION:    Out of Bed to Chair     DVT/Decubiti Prophylaxis    REHAB PLAN:     [ x  ] Bedside P/T 3-5 times a week   [   ]   Bedside O/T  2-3 times a week             [   ] No Rehab Therapy Indicated                   [   ]  Speech Therapy   Conditioning/ROM                                    ADL  Bed Mobility                                               Conditioning/ROM  Transfers                                                     Bed Mobility  Sitting /Standing Balance                         Transfers                                        Gait Training                                               Sitting/Standing Balance  Stair Training [   ]Applicable                    Home equipment Eval                                                                        Splinting  [   ] Only      GOALS:   ADL   [x   ]   Independent                    Transfers  [x   ] Independent                          Ambulation  [ x  ] Independent     [  x  ] With device                            [   ]  CG                                                         [   ]  CG                                                                  [   ] CG                            [    ] Min A                                                   [   ] Min A                                                              [   ] Min  A          DISCHARGE PLAN:   [   ]  Good candidate for Intensive Rehabilitation/Hospital based-[facility]A [facility]                                             Will tolerate 3hrs Intensive Rehab Daily                                       [ xx   ]  Short Term Rehab in Skilled Nursing Facility                                       [    ]  Home with Outpatient or  services                                         [    ]  Possible Candidate for Intensive Hospital based Rehab

## 2020-03-09 NOTE — PHYSICAL THERAPY INITIAL EVALUATION ADULT - ADDITIONAL COMMENTS
Per HHA, pt requires RW and assist. HHA states the pt has experienced a decrease in mobility in the last few weeks

## 2020-03-09 NOTE — PROGRESS NOTE ADULT - SUBJECTIVE AND OBJECTIVE BOX
MARLENE BOO 89y Female  MRN#: [id number]         SUBJECTIVE  Patient is a 89y old Female who presents with a chief complaint of PMD: Megan  Psych: Marlon (08 Mar 2020 15:32)  Currently admitted to medicine with the primary diagnosis of Unable to ambulate    Patient seen sitting in chair. reports no lower ext pain      OBJECTIVE  PAST MEDICAL & SURGICAL HISTORY  Dementia  H/O bilateral hip replacements    ALLERGIES:  sulfonamides (Other)    MEDICATIONS:  STANDING MEDICATIONS  apixaban 10 milliGRAM(s) Oral two times a day  levothyroxine 50 MICROGram(s) Oral daily  losartan 25 milliGRAM(s) Oral daily  lurasidone 40 milliGRAM(s) Oral daily  multivitamin 1 Tablet(s) Oral daily  sertraline 25 milliGRAM(s) Oral daily  traZODone 50 milliGRAM(s) Oral at bedtime    PRN MEDICATIONS  senna 2 Tablet(s) Oral at bedtime PRN      VITAL SIGNS: Last 24 Hours  T(C): 35.7 (09 Mar 2020 05:29), Max: 37.1 (08 Mar 2020 19:42)  T(F): 96.3 (09 Mar 2020 05:29), Max: 98.7 (08 Mar 2020 19:42)  HR: 94 (09 Mar 2020 05:29) (92 - 94)  BP: 139/70 (09 Mar 2020 05:29) (127/60 - 139/70)  BP(mean): --  RR: 18 (09 Mar 2020 05:29) (18 - 18)  SpO2: --    LABS:                        10.4   6.04  )-----------( 283      ( 08 Mar 2020 11:50 )             32.2     03-08    143  |  105  |  24<H>  ----------------------------<  96  4.4   |  28  |  1.2    Ca    9.3      08 Mar 2020 11:50  Mg     2.0     03-08        Urinalysis Basic - ( 07 Mar 2020 19:00 )    Color: Yellow / Appearance: Clear / S.012 / pH: x  Gluc: x / Ketone: Negative  / Bili: Negative / Urobili: <2 mg/dL   Blood: x / Protein: Negative / Nitrite: Negative   Leuk Esterase: Negative / RBC: x / WBC x   Sq Epi: x / Non Sq Epi: x / Bacteria: x                RADIOLOGY:      Physical Exam:     GENERAL: NAD, Cachectic, confused   	PSYCH: AAOx1 (BL)   	HEENT:  Atraumatic, Normocephalic. EOMI, PERRLA, conjunctiva clear, sclera white, No JVD  	PULMONARY: Clear to auscultation bilaterally; No wheeze  	CARDIOVASCULAR: Regular rate and rhythm; No murmurs, rubs, or gallops  	GASTROINTESTINAL: Soft, Nontender, Nondistended; Bowel sounds present  	MUSCULOSKELETAL:  2+ Peripheral Pulses, No clubbing, cyanosis, +2 non pitting LE edema   	NEUROLOGY: Unable to assess     SKIN: No rashes or lesions        ASSESSMENT & PLAN  89F with pmhx of HTN, hypothyroidism,  severe dementia, not oriented to self but alert and interactive, presenting due to multiple falls and LE swelling found to have bilateral DVT    #) Multiple falls in setting of severe dementia  -No sign of acute infection, with  LE edema  -AAOx1 at baseline, confused  -Had multiple non traumatic falls in last 2-3 weeks  -Trauma workup negative  -Will need PT/Rehab  -TSH    #) LE edema  -per son has appeared in last 2-3 weeks  -will get duplex --> positive for acute DVT in bilateral ext --> started on eliquis      #) Hypothyroidism  -cont home meds    DVT ppx: Lovenox   GI ppx:  Not indicated   Diet:  S/S, 1:1, dysphagia diet   Activity: Inc as tolerated   CHG  Dispo:  Likely SNF, social work for increased HHA

## 2020-03-09 NOTE — PHYSICAL THERAPY INITIAL EVALUATION ADULT - PERTINENT HX OF CURRENT PROBLEM, REHAB EVAL
89F with pmhx of HTN, hypothyroidism,  severe dementia, not oriented to self but alert and interactive, presenting due to multiple falls and LE swelling

## 2020-03-09 NOTE — PHYSICAL THERAPY INITIAL EVALUATION ADULT - GAIT DEVIATIONS NOTED, PT EVAL
impaired sequencing with RW, pt taking hands off of RW at times, requiring cues to keep hands on RW/decreased marianna/decreased weight-shifting ability/decreased stride length/decreased step length

## 2020-03-09 NOTE — PHYSICAL THERAPY INITIAL EVALUATION ADULT - TRANSFER SAFETY CONCERNS NOTED: SIT/STAND, REHAB EVAL
decreased step length/decreased weight-shifting ability/decreased safety awareness/significant retropulsion in standing, narrow MAGALYS, unsteadiness,

## 2020-03-09 NOTE — DIETITIAN INITIAL EVALUATION ADULT. - ENERGY NEEDS
calorie: 1071 - 1160 kcal/day (MSJ x 1.2 - 1.3 AF)  protein: 55 - 66 gms/day (1.0 - 1.2 gm/kg)  fluid: 1ml/kcal or per LIP

## 2020-03-09 NOTE — PROGRESS NOTE ADULT - ATTENDING COMMENTS
Agree with resident's note, HPI, PE, assessment and plan.  Pt was seen and examined independently.     89F with pmhx of HTN, hypothyroidism,  severe dementia, not oriented to self but alert and interactive, presenting due to multiple falls due to legs pain and LE swelling.  No new complaints. Pt is confused, mental status at baseline.     # Multiple fall, leg pain due to acute DVT   - negative trauma work up  - US duplex positive for acute DVT  - started Eliquis 10 mg BID for 7 dys, then 5 mg BID  - ECHO pending    # Advanced dementia  - Fall precautions  - PT / Rehab - may need placement vs. SNF  - UA negative   - TSH pending    # HTN - continue home meds    # Hypothyroidism: continue home dose of levothyroxine.     DVT prophylaxis Eliquis .     #Progress Note Handoff  Pending ECHO and dispo to STR  Family Discussion: spoke to son Ron, he wants his mom to go for STR  Future Disposition: STR

## 2020-03-09 NOTE — DIETITIAN INITIAL EVALUATION ADULT. - PHYSICAL APPEARANCE
well nourished/BMI 23 using stated height 61 inches. IBW: 105# HHA unsure of pt's UBW. Observed mild muscle depletion in temporal region - suspect this is age related. Skin intact

## 2020-03-09 NOTE — DIETITIAN INITIAL EVALUATION ADULT. - FACTORS AFF FOOD INTAKE
Pt w/ severe dementia. Spoke to HHA at b/s. Reports pt eats well - sometimes she does finish 100% of meal tray. Prefers soft foods. Has all teeth. LBM 3/6. NKFA. Does not take nutrition supplements at home. 1: 1 feeds. No cultural/Christian preferences.

## 2020-03-09 NOTE — PHYSICAL THERAPY INITIAL EVALUATION ADULT - GENERAL OBSERVATIONS, REHAB EVAL
Pt encountered sitting in chair in recliner position in NAD, +chair alarm, +lap belt, +HHA at b/s. Pt appears pleasantly confused but agrees to PT.

## 2020-03-09 NOTE — DIETITIAN INITIAL EVALUATION ADULT. - OTHER INFO
Pt p/w  Unable to ambulate. Multiple falls in setting of severe dementia. LE edema.  acute DVT in bilateral ext. Hypothyroidism. per SLP eval, No s/s aspiration/penetration for thin liquids, puree, mechanical soft.

## 2020-03-10 VITALS
RESPIRATION RATE: 18 BRPM | HEART RATE: 69 BPM | TEMPERATURE: 97 F | DIASTOLIC BLOOD PRESSURE: 60 MMHG | SYSTOLIC BLOOD PRESSURE: 128 MMHG

## 2020-03-10 LAB — TSH SERPL-MCNC: 19.1 UIU/ML — HIGH (ref 0.27–4.2)

## 2020-03-10 PROCEDURE — 99239 HOSP IP/OBS DSCHRG MGMT >30: CPT

## 2020-03-10 PROCEDURE — 93306 TTE W/DOPPLER COMPLETE: CPT | Mod: 26

## 2020-03-10 RX ORDER — APIXABAN 2.5 MG/1
1 TABLET, FILM COATED ORAL
Qty: 60 | Refills: 3
Start: 2020-03-10 | End: 2020-07-07

## 2020-03-10 RX ORDER — LEVOTHYROXINE SODIUM 125 MCG
1 TABLET ORAL
Qty: 30 | Refills: 3
Start: 2020-03-10 | End: 2020-07-07

## 2020-03-10 RX ORDER — TRAZODONE HCL 50 MG
1 TABLET ORAL
Qty: 0 | Refills: 0 | DISCHARGE

## 2020-03-10 RX ORDER — APIXABAN 2.5 MG/1
2 TABLET, FILM COATED ORAL
Qty: 12 | Refills: 0
Start: 2020-03-10 | End: 2020-03-12

## 2020-03-10 RX ORDER — LEVOTHYROXINE SODIUM 125 MCG
75 TABLET ORAL DAILY
Refills: 0 | Status: DISCONTINUED | OUTPATIENT
Start: 2020-03-11 | End: 2020-03-10

## 2020-03-10 RX ORDER — LOSARTAN POTASSIUM 100 MG/1
1 TABLET, FILM COATED ORAL
Qty: 30 | Refills: 3
Start: 2020-03-10 | End: 2020-07-07

## 2020-03-10 RX ORDER — LEVOTHYROXINE SODIUM 125 MCG
1 TABLET ORAL
Qty: 0 | Refills: 0 | DISCHARGE

## 2020-03-10 RX ADMIN — LOSARTAN POTASSIUM 25 MILLIGRAM(S): 100 TABLET, FILM COATED ORAL at 05:43

## 2020-03-10 RX ADMIN — LURASIDONE HYDROCHLORIDE 40 MILLIGRAM(S): 40 TABLET ORAL at 13:41

## 2020-03-10 RX ADMIN — APIXABAN 10 MILLIGRAM(S): 2.5 TABLET, FILM COATED ORAL at 05:43

## 2020-03-10 RX ADMIN — SERTRALINE 25 MILLIGRAM(S): 25 TABLET, FILM COATED ORAL at 13:41

## 2020-03-10 RX ADMIN — Medication 50 MICROGRAM(S): at 05:43

## 2020-03-10 RX ADMIN — Medication 1 TABLET(S): at 13:41

## 2020-03-10 NOTE — PROGRESS NOTE ADULT - ATTENDING COMMENTS
Agree with resident's note, HPI, PE, assessment and plan, except as noted below.  Pt was seen and examined independently.     Pt was seen laying In recliner, she is more confused today than usual. She does not have active complaints.     ROS unable to obtain due to dementia and confusion  Physical exam:   GENERAL: NAD, frail lady, confused  NECK: Supple, No JVD  PULMONARY/CHEST: No rales, rhonchi, wheezing  CARDIOVASC: Regular rate and rhythm; No murmurs  GI/ABDOMEN: Soft, Nontender, Nondistended; Bowel sounds present  EXTREMITIES: b/l LE edema  SKIN: No rashes or lesions  NERVOUS SYSTEM:  Alert & Oriented X1, no focal deficit     labs reviewed: TSH 19, Cr 1.2, Hb 10.4    89F with pmhx of HTN, hypothyroidism,  severe dementia, not oriented to self but alert and interactive, presenting due to multiple falls and LE swelling.    # Multiple fall, leg pain due to acute DVT   - negative trauma work up  - US duplex positive for acute DVT  - started Eliquis 10 mg BID for 7 dys, then 5 mg BID    # Advanced dementia  - Fall precautions  - PT / Rehab - may need placement vs. SNF  - UA negative   - TSH elevated    # HTN - continue home meds    # Hypothyroidism: TSH 19, will increase levothyroxine to 75 mcg, OP f/u with PMD for TSH check in 6 weeks.     DVT prophylaxis Eliquis .     Pt is clinically stable and can be discharged to [facility].

## 2020-03-10 NOTE — DISCHARGE NOTE PROVIDER - CARE PROVIDER_API CALL
Haley Guzman)  Geriatric Medicine; Internal Medicine  [address] [place] [address]  Phone: [phone]  Fax: [phone]  Follow Up Time: 1 week

## 2020-03-10 NOTE — DISCHARGE NOTE PROVIDER - NSDCCPCAREPLAN_GEN_ALL_CORE_FT
PRINCIPAL DISCHARGE DIAGNOSIS  Diagnosis: DVT, lower extremity  Assessment and Plan of Treatment: Deep Vein Thrombosis  A deep vein thrombosis (DVT) is a blood clot (thrombus) that usually occurs in a deep, larger vein of the lower leg or the pelvis, or in an upper extremity such as the arm. These are dangerous and can lead to serious and even life-threatening complications if the clot travels to the lungs. Symptoms include swelling of the arm or leg, warmth and redness of the arm or leg, and pain. Treatment may include taking a blood thinning medication; make sure to take anything prescribed as instructed.  SEEK IMMEDIATE MEDICAL CARE IF YOU HAVE ANY OF THE FOLLOWING SYMPTOMS: shortness of breath, chest pain, rapid or irregular heartbeat, lightheadedness/dizziness, coughing up blood, or any bleeding in your vomit, stool, or urine. These symptoms may represent a serious problem that is an emergency. Do not wait to see if the symptoms will go away. Call 911 and do not drive yourself to the hospital.        SECONDARY DISCHARGE DIAGNOSES  Diagnosis: Hypothyroidism  Assessment and Plan of Treatment: your thyroid nodule is working slower than usual. your tsh level was high 19. we increased your dose of levothyroxine to 75 daily. please repeat TSH level in 6 weeks and fu with PMD

## 2020-03-10 NOTE — PROGRESS NOTE ADULT - SUBJECTIVE AND OBJECTIVE BOX
MARLENE BOO 89y Female  MRN#: [id number]         SUBJECTIVE  Patient is a 89y old Female who presents with a chief complaint of PMD: [place]  Psych: Marlon (09 Mar 2020 13:06)  Currently admitted to medicine with the primary diagnosis of Unable to ambulate    Patient doing fine. she reports no complaints    OBJECTIVE  PAST MEDICAL & SURGICAL HISTORY  Dementia  H/O bilateral hip replacements    ALLERGIES:  sulfonamides (Other)    MEDICATIONS:  STANDING MEDICATIONS  apixaban 10 milliGRAM(s) Oral two times a day  losartan 25 milliGRAM(s) Oral daily  lurasidone 40 milliGRAM(s) Oral daily  multivitamin 1 Tablet(s) Oral daily  sertraline 25 milliGRAM(s) Oral daily  traZODone 50 milliGRAM(s) Oral at bedtime    PRN MEDICATIONS  senna 2 Tablet(s) Oral at bedtime PRN      VITAL SIGNS: Last 24 Hours  T(C): 35.9 (10 Mar 2020 06:11), Max: 36.4 (09 Mar 2020 21:25)  T(F): 96.6 (10 Mar 2020 06:11), Max: 97.5 (09 Mar 2020 21:25)  HR: 69 (10 Mar 2020 06:11) (69 - 85)  BP: 128/60 (10 Mar 2020 06:11) (109/51 - 145/58)  BP(mean): --  RR: 18 (10 Mar 2020 06:11) (16 - 18)  SpO2: --    LABS:                        10.4   6.04  )-----------( 283      ( 08 Mar 2020 11:50 )             32.2     03-09    141  |  102  |  29<H>  ----------------------------<  74  4.4   |  28  |  1.2    Ca    9.3      09 Mar 2020 07:44  Mg     2.0     03-08                    RADIOLOGY:    Physical Exam:     GENERAL: NAD, Cachectic, confused   	PSYCH: AAOx1 (BL)   	HEENT:  Atraumatic, Normocephalic. EOMI, PERRLA, conjunctiva clear, sclera white, No JVD  	PULMONARY: Clear to auscultation bilaterally; No wheeze  	CARDIOVASCULAR: Regular rate and rhythm; No murmurs, rubs, or gallops  	GASTROINTESTINAL: Soft, Nontender, Nondistended; Bowel sounds present  	MUSCULOSKELETAL:  2+ Peripheral Pulses, No clubbing, cyanosis, +2 non pitting LE edema   	NEUROLOGY: Unable to assess     SKIN: No rashes or lesions        ASSESSMENT & PLAN  89F with pmhx of HTN, hypothyroidism,  severe dementia, not oriented to self but alert and interactive, presenting due to multiple falls and LE swelling found to have bilateral DVT    #) Multiple falls in setting of severe dementia  -No sign of acute infection, with  LE edema  -AAOx1 at baseline, confused  -Had multiple non traumatic falls in last 2-3 weeks  -Trauma workup negative  -Will need PT/Rehab  -TSH --> 19. fu free T3,T4. will increase levothyroxine dose to 75    #) LE edema  -per son has appeared in last 2-3 weeks  -will get duplex --> positive for acute DVT in bilateral ext --> started on eliquis  - TTE      #) Hypothyroidism  -cont home meds    DVT ppx: Lovenox   GI ppx:  Not indicated   Diet:  S/S, 1:1, dysphagia diet   Activity: Inc as tolerated   CHG  Dispo:  SNF

## 2020-03-10 NOTE — DISCHARGE NOTE PROVIDER - HOSPITAL COURSE
89F with pmhx of HTN, hypothyroidism,  severe dementia, not oriented to self but alert and interactive, presenting due to multiple falls and LE swelling. Family reports Pt has had multiple falls over the past 2-3 weeks with no head injury. During this time her legs have become more edematous, causing her to collapse straight down. She usually walks with cane and uses railing in house. She has been trying to get out up of bed but is unable to do so.   Pt subsequently requires more  help with ambulation now.  Family has HHA during the day but needs assistance at night.  Patient was seen at PMD and referred to ED for further workup. presents due to L leg pain and failure to ambulate since fall x3 2 days ago. Patient has severe dementia at baseline and unable to communicate well, called son, HCP and received history from him.              #) Multiple falls in setting of severe dementia    -No sign of acute infection, with  LE edema    -AAOx1 at baseline, confused    -Had multiple non traumatic falls in last 2-3 weeks    -Trauma workup negative    -TSH --> 19. increased levothyroxine dose to 75        #) LE edema    -per son has appeared in last 2-3 weeks    -will get duplex --> positive for acute DVT in bilateral ext --> started on eliquis. still has 3 days of 10 bid, then can take eliquis 5 bid    - TTE --> G1 diastolic dysfunction            #) Hypothyroidism    - increase levothyroxine to 75    - fu TSH in 4-6 weeks

## 2020-03-10 NOTE — DISCHARGE NOTE PROVIDER - NSDCMRMEDTOKEN_GEN_ALL_CORE_FT
apixaban 5 mg oral tablet: 2 tab(s) orally 2 times a day for 3 days  apixaban 5 mg oral tablet: 1 tab(s) orally 2 times a day. to be started after eliquis 10 bid is finished  Latuda 40 mg oral tablet: 1 tab(s) orally once a day  levothyroxine 75 mcg (0.075 mg) oral tablet: 1 tab(s) orally once a day  losartan 25 mg oral tablet: 1 tab(s) orally once a day  Multiple Vitamins oral tablet: 1 tab(s) orally once a day  sertraline 25 mg oral tablet: 1 tab(s) orally once a day

## 2020-03-10 NOTE — DISCHARGE NOTE PROVIDER - NSDCFUSCHEDAPPT_GEN_ALL_CORE_FT
MARLENE BOO ; 04/01/2020 ; [facility]  MARLENE BOO ; 04/02/2020 ; [facility] [address]

## 2020-03-16 DIAGNOSIS — F03.90 UNSPECIFIED DEMENTIA WITHOUT BEHAVIORAL DISTURBANCE: ICD-10-CM

## 2020-03-16 DIAGNOSIS — I82.461 ACUTE EMBOLISM AND THROMBOSIS OF RIGHT CALF MUSCULAR VEIN: ICD-10-CM

## 2020-03-16 DIAGNOSIS — I10 ESSENTIAL (PRIMARY) HYPERTENSION: ICD-10-CM

## 2020-03-16 DIAGNOSIS — R29.6 REPEATED FALLS: ICD-10-CM

## 2020-03-16 DIAGNOSIS — M79.662 PAIN IN LEFT LOWER LEG: ICD-10-CM

## 2020-03-16 DIAGNOSIS — Z96.643 PRESENCE OF ARTIFICIAL HIP JOINT, BILATERAL: ICD-10-CM

## 2020-03-16 DIAGNOSIS — I82.432 ACUTE EMBOLISM AND THROMBOSIS OF LEFT POPLITEAL VEIN: ICD-10-CM

## 2020-03-16 DIAGNOSIS — E03.9 HYPOTHYROIDISM, UNSPECIFIED: ICD-10-CM

## 2020-03-16 DIAGNOSIS — I82.431 ACUTE EMBOLISM AND THROMBOSIS OF RIGHT POPLITEAL VEIN: ICD-10-CM

## 2020-03-16 DIAGNOSIS — I82.411 ACUTE EMBOLISM AND THROMBOSIS OF RIGHT FEMORAL VEIN: ICD-10-CM

## 2020-03-16 DIAGNOSIS — Z88.2 ALLERGY STATUS TO SULFONAMIDES: ICD-10-CM

## 2020-04-02 ENCOUNTER — APPOINTMENT (OUTPATIENT)
Dept: GERIATRICS | Facility: CLINIC | Age: 85
End: 2020-04-02

## 2020-05-28 ENCOUNTER — TRANSCRIPTION ENCOUNTER (OUTPATIENT)
Age: 85
End: 2020-05-28

## 2020-05-28 ENCOUNTER — APPOINTMENT (OUTPATIENT)
Dept: GERIATRICS | Facility: HOME HEALTH | Age: 85
End: 2020-05-28
Payer: MEDICARE

## 2020-05-28 ENCOUNTER — OUTPATIENT (OUTPATIENT)
Dept: OUTPATIENT SERVICES | Facility: HOSPITAL | Age: 85
LOS: 1 days | Discharge: HOME | End: 2020-05-28

## 2020-05-28 VITALS — DIASTOLIC BLOOD PRESSURE: 60 MMHG | SYSTOLIC BLOOD PRESSURE: 144 MMHG | RESPIRATION RATE: 18 BRPM

## 2020-05-28 DIAGNOSIS — Z96.643 PRESENCE OF ARTIFICIAL HIP JOINT, BILATERAL: Chronic | ICD-10-CM

## 2020-05-28 DIAGNOSIS — K64.9 UNSPECIFIED HEMORRHOIDS: ICD-10-CM

## 2020-05-28 PROCEDURE — 99213 OFFICE O/P EST LOW 20 MIN: CPT | Mod: 95,GC

## 2020-05-28 RX ORDER — AMOXICILLIN AND CLAVULANATE POTASSIUM 875; 125 MG/1; MG/1
875-125 TABLET, COATED ORAL
Qty: 14 | Refills: 0 | Status: DISCONTINUED | COMMUNITY
Start: 2019-12-11 | End: 2020-05-28

## 2020-05-28 RX ORDER — AMOXICILLIN AND CLAVULANATE POTASSIUM 250; 62.5 MG/5ML; MG/5ML
250-62.5 FOR SUSPENSION ORAL
Qty: 1 | Refills: 0 | Status: DISCONTINUED | COMMUNITY
Start: 2019-12-19 | End: 2020-05-28

## 2020-05-28 RX ORDER — OLANZAPINE 2.5 MG/1
2.5 TABLET, FILM COATED ORAL DAILY
Qty: 30 | Refills: 2 | Status: DISCONTINUED | COMMUNITY
Start: 2020-01-02 | End: 2020-05-28

## 2020-05-28 NOTE — HISTORY OF PRESENT ILLNESS
[FreeTextEntry1] : was in hospital in early march for edema of leg; had DVT; was put on eliquis 5mg BID\par Also on losartan 25mg QD for high BP\par Son has been giving 1/2 tab of 50mg sertraline in AM and latuda 20mg at lunch and latuda 60mg at HS; Also  quietipine 50mg q HS - she is much calmer and now less depressed; no longer crying; has a baby gate at bedroom to keep her from wandering at night.\par DVTs of bilat LEs.\par also in hospital levothyroxine increased to 75mg QD.

## 2020-05-28 NOTE — REASON FOR VISIT
[Home] : at home, [unfilled] , at the time of the visit. [Medical Office: ([facility])___] : at the medical office located in  [Family Member] : family member [Verbal consent obtained from patient] : the patient, [unfilled] [Acute] : an acute visit

## 2020-05-28 NOTE — PHYSICAL EXAM
[Extraocular Movements] : extraocular movements were intact [Jugular Venous Distention Increased] : there was no jugular-venous distention [FreeTextEntry1] : trace edema of LEs

## 2020-05-28 NOTE — END OF VISIT
[FreeTextEntry3] : 30 min video telehealth visit\par bilat LE DVT; change of tx for HTN; change of Tx for hypothyroid; documentation of all new meds for behavioral changes and depression related to dementia

## 2020-05-28 NOTE — ASSESSMENT
[FreeTextEntry1] : 1. re bilat LE DVTs - sched for bilat duplex US to f/u status in first week of July; for now cont Eliquis\par 2. inc levothyroixine to 75mcg - ordered to cont at hospital dose; will recheck level of TSh\par 3. re HTN - no losartan avail; start lisinopril at 5mg daily\par 4. for hemorrhoids start proctocort cream - ordered.

## 2020-06-04 RX ORDER — APIXABAN 5 MG/1
5 TABLET, FILM COATED ORAL
Qty: 180 | Refills: 1 | Status: ACTIVE | COMMUNITY
Start: 2020-06-04 | End: 1900-01-01

## 2020-06-26 DIAGNOSIS — I10 ESSENTIAL (PRIMARY) HYPERTENSION: ICD-10-CM

## 2020-06-26 DIAGNOSIS — K64.9 UNSPECIFIED HEMORRHOIDS: ICD-10-CM

## 2020-06-26 DIAGNOSIS — I82.403 ACUTE EMBOLISM AND THROMBOSIS OF UNSPECIFIED DEEP VEINS OF LOWER EXTREMITY, BILATERAL: ICD-10-CM

## 2020-06-26 DIAGNOSIS — E03.9 HYPOTHYROIDISM, UNSPECIFIED: ICD-10-CM

## 2020-06-26 DIAGNOSIS — F03.91 UNSPECIFIED DEMENTIA WITH BEHAVIORAL DISTURBANCE: ICD-10-CM

## 2020-07-02 ENCOUNTER — INPATIENT (INPATIENT)
Facility: HOSPITAL | Age: 85
LOS: 4 days | Discharge: NURSING, REHAB OR LONG-TERM CARE | End: 2020-07-07
Attending: INTERNAL MEDICINE | Admitting: INTERNAL MEDICINE
Payer: MEDICARE

## 2020-07-02 VITALS
SYSTOLIC BLOOD PRESSURE: 147 MMHG | RESPIRATION RATE: 17 BRPM | HEART RATE: 85 BPM | DIASTOLIC BLOOD PRESSURE: 67 MMHG | OXYGEN SATURATION: 97 % | TEMPERATURE: 98 F

## 2020-07-02 DIAGNOSIS — Z96.643 PRESENCE OF ARTIFICIAL HIP JOINT, BILATERAL: Chronic | ICD-10-CM

## 2020-07-02 DIAGNOSIS — Z66 DO NOT RESUSCITATE: ICD-10-CM

## 2020-07-02 DIAGNOSIS — Z79.01 LONG TERM (CURRENT) USE OF ANTICOAGULANTS: ICD-10-CM

## 2020-07-02 DIAGNOSIS — G93.41 METABOLIC ENCEPHALOPATHY: ICD-10-CM

## 2020-07-02 DIAGNOSIS — Z88.2 ALLERGY STATUS TO SULFONAMIDES: ICD-10-CM

## 2020-07-02 LAB
ALBUMIN SERPL ELPH-MCNC: 4.5 G/DL — SIGNIFICANT CHANGE UP (ref 3.5–5.2)
ALP SERPL-CCNC: 80 U/L — SIGNIFICANT CHANGE UP (ref 30–115)
ALT FLD-CCNC: 14 U/L — SIGNIFICANT CHANGE UP (ref 0–41)
ANION GAP SERPL CALC-SCNC: 17 MMOL/L — HIGH (ref 7–14)
APPEARANCE UR: CLEAR — SIGNIFICANT CHANGE UP
APTT BLD: 35.1 SEC — SIGNIFICANT CHANGE UP (ref 27–39.2)
AST SERPL-CCNC: 27 U/L — SIGNIFICANT CHANGE UP (ref 0–41)
BASOPHILS # BLD AUTO: 0.06 K/UL — SIGNIFICANT CHANGE UP (ref 0–0.2)
BASOPHILS NFR BLD AUTO: 0.7 % — SIGNIFICANT CHANGE UP (ref 0–1)
BILIRUB SERPL-MCNC: 0.3 MG/DL — SIGNIFICANT CHANGE UP (ref 0.2–1.2)
BILIRUB UR-MCNC: NEGATIVE — SIGNIFICANT CHANGE UP
BUN SERPL-MCNC: 48 MG/DL — HIGH (ref 10–20)
CALCIUM SERPL-MCNC: 9.7 MG/DL — SIGNIFICANT CHANGE UP (ref 8.5–10.1)
CHLORIDE SERPL-SCNC: 101 MMOL/L — SIGNIFICANT CHANGE UP (ref 98–110)
CO2 SERPL-SCNC: 21 MMOL/L — SIGNIFICANT CHANGE UP (ref 17–32)
COLOR SPEC: SIGNIFICANT CHANGE UP
CREAT SERPL-MCNC: 1.7 MG/DL — HIGH (ref 0.7–1.5)
DIFF PNL FLD: NEGATIVE — SIGNIFICANT CHANGE UP
EOSINOPHIL # BLD AUTO: 0.33 K/UL — SIGNIFICANT CHANGE UP (ref 0–0.7)
EOSINOPHIL NFR BLD AUTO: 4 % — SIGNIFICANT CHANGE UP (ref 0–8)
GLUCOSE SERPL-MCNC: 100 MG/DL — HIGH (ref 70–99)
GLUCOSE UR QL: NEGATIVE — SIGNIFICANT CHANGE UP
HCT VFR BLD CALC: 35.1 % — LOW (ref 37–47)
HGB BLD-MCNC: 11.5 G/DL — LOW (ref 12–16)
IMM GRANULOCYTES NFR BLD AUTO: 0.2 % — SIGNIFICANT CHANGE UP (ref 0.1–0.3)
INR BLD: 1.22 RATIO — SIGNIFICANT CHANGE UP (ref 0.65–1.3)
KETONES UR-MCNC: NEGATIVE — SIGNIFICANT CHANGE UP
LACTATE SERPL-SCNC: 1.1 MMOL/L — SIGNIFICANT CHANGE UP (ref 0.7–2)
LEUKOCYTE ESTERASE UR-ACNC: NEGATIVE — SIGNIFICANT CHANGE UP
LYMPHOCYTES # BLD AUTO: 0.81 K/UL — LOW (ref 1.2–3.4)
LYMPHOCYTES # BLD AUTO: 9.8 % — LOW (ref 20.5–51.1)
MCHC RBC-ENTMCNC: 30.9 PG — SIGNIFICANT CHANGE UP (ref 27–31)
MCHC RBC-ENTMCNC: 32.8 G/DL — SIGNIFICANT CHANGE UP (ref 32–37)
MCV RBC AUTO: 94.4 FL — SIGNIFICANT CHANGE UP (ref 81–99)
MONOCYTES # BLD AUTO: 0.57 K/UL — SIGNIFICANT CHANGE UP (ref 0.1–0.6)
MONOCYTES NFR BLD AUTO: 6.9 % — SIGNIFICANT CHANGE UP (ref 1.7–9.3)
NEUTROPHILS # BLD AUTO: 6.46 K/UL — SIGNIFICANT CHANGE UP (ref 1.4–6.5)
NEUTROPHILS NFR BLD AUTO: 78.4 % — HIGH (ref 42.2–75.2)
NITRITE UR-MCNC: NEGATIVE — SIGNIFICANT CHANGE UP
NRBC # BLD: 0 /100 WBCS — SIGNIFICANT CHANGE UP (ref 0–0)
PH UR: 6 — SIGNIFICANT CHANGE UP (ref 5–8)
PLATELET # BLD AUTO: 293 K/UL — SIGNIFICANT CHANGE UP (ref 130–400)
POTASSIUM SERPL-MCNC: 5.1 MMOL/L — HIGH (ref 3.5–5)
POTASSIUM SERPL-SCNC: 5.1 MMOL/L — HIGH (ref 3.5–5)
PROT SERPL-MCNC: 7.6 G/DL — SIGNIFICANT CHANGE UP (ref 6–8)
PROT UR-MCNC: NEGATIVE — SIGNIFICANT CHANGE UP
PROTHROM AB SERPL-ACNC: 14 SEC — HIGH (ref 9.95–12.87)
RBC # BLD: 3.72 M/UL — LOW (ref 4.2–5.4)
RBC # FLD: 13.3 % — SIGNIFICANT CHANGE UP (ref 11.5–14.5)
SODIUM SERPL-SCNC: 139 MMOL/L — SIGNIFICANT CHANGE UP (ref 135–146)
SP GR SPEC: 1.01 — SIGNIFICANT CHANGE UP (ref 1.01–1.02)
TROPONIN T SERPL-MCNC: <0.01 NG/ML — SIGNIFICANT CHANGE UP
UROBILINOGEN FLD QL: SIGNIFICANT CHANGE UP
WBC # BLD: 8.25 K/UL — SIGNIFICANT CHANGE UP (ref 4.8–10.8)
WBC # FLD AUTO: 8.25 K/UL — SIGNIFICANT CHANGE UP (ref 4.8–10.8)

## 2020-07-02 PROCEDURE — 93010 ELECTROCARDIOGRAM REPORT: CPT

## 2020-07-02 PROCEDURE — 71045 X-RAY EXAM CHEST 1 VIEW: CPT | Mod: 26

## 2020-07-02 PROCEDURE — 99285 EMERGENCY DEPT VISIT HI MDM: CPT

## 2020-07-02 PROCEDURE — 70450 CT HEAD/BRAIN W/O DYE: CPT | Mod: 26

## 2020-07-02 PROCEDURE — 99497 ADVNCD CARE PLAN 30 MIN: CPT | Mod: 25

## 2020-07-02 PROCEDURE — 99223 1ST HOSP IP/OBS HIGH 75: CPT

## 2020-07-02 RX ORDER — APIXABAN 2.5 MG/1
2.5 TABLET, FILM COATED ORAL EVERY 12 HOURS
Refills: 0 | Status: DISCONTINUED | OUTPATIENT
Start: 2020-07-02 | End: 2020-07-04

## 2020-07-02 RX ORDER — SERTRALINE 25 MG/1
25 TABLET, FILM COATED ORAL DAILY
Refills: 0 | Status: DISCONTINUED | OUTPATIENT
Start: 2020-07-02 | End: 2020-07-07

## 2020-07-02 RX ORDER — APIXABAN 2.5 MG/1
5 TABLET, FILM COATED ORAL EVERY 12 HOURS
Refills: 0 | Status: DISCONTINUED | OUTPATIENT
Start: 2020-07-02 | End: 2020-07-02

## 2020-07-02 RX ORDER — CEFTRIAXONE 500 MG/1
1000 INJECTION, POWDER, FOR SOLUTION INTRAMUSCULAR; INTRAVENOUS EVERY 24 HOURS
Refills: 0 | Status: DISCONTINUED | OUTPATIENT
Start: 2020-07-02 | End: 2020-07-03

## 2020-07-02 RX ORDER — LURASIDONE HYDROCHLORIDE 40 MG/1
1 TABLET ORAL
Qty: 0 | Refills: 0 | DISCHARGE

## 2020-07-02 RX ORDER — CEFTRIAXONE 500 MG/1
1000 INJECTION, POWDER, FOR SOLUTION INTRAMUSCULAR; INTRAVENOUS ONCE
Refills: 0 | Status: COMPLETED | OUTPATIENT
Start: 2020-07-02 | End: 2020-07-02

## 2020-07-02 RX ORDER — AMLODIPINE BESYLATE 2.5 MG/1
5 TABLET ORAL DAILY
Refills: 0 | Status: DISCONTINUED | OUTPATIENT
Start: 2020-07-02 | End: 2020-07-07

## 2020-07-02 RX ORDER — SERTRALINE 25 MG/1
1 TABLET, FILM COATED ORAL
Qty: 0 | Refills: 0 | DISCHARGE

## 2020-07-02 RX ORDER — QUETIAPINE FUMARATE 200 MG/1
100 TABLET, FILM COATED ORAL DAILY
Refills: 0 | Status: DISCONTINUED | OUTPATIENT
Start: 2020-07-02 | End: 2020-07-06

## 2020-07-02 RX ORDER — LURASIDONE HYDROCHLORIDE 40 MG/1
60 TABLET ORAL DAILY
Refills: 0 | Status: DISCONTINUED | OUTPATIENT
Start: 2020-07-02 | End: 2020-07-02

## 2020-07-02 RX ORDER — LURASIDONE HYDROCHLORIDE 40 MG/1
20 TABLET ORAL DAILY
Refills: 0 | Status: DISCONTINUED | OUTPATIENT
Start: 2020-07-02 | End: 2020-07-02

## 2020-07-02 RX ORDER — SODIUM CHLORIDE 9 MG/ML
1000 INJECTION INTRAMUSCULAR; INTRAVENOUS; SUBCUTANEOUS
Refills: 0 | Status: DISCONTINUED | OUTPATIENT
Start: 2020-07-02 | End: 2020-07-04

## 2020-07-02 RX ORDER — LEVOTHYROXINE SODIUM 125 MCG
75 TABLET ORAL DAILY
Refills: 0 | Status: DISCONTINUED | OUTPATIENT
Start: 2020-07-02 | End: 2020-07-07

## 2020-07-02 RX ORDER — LANOLIN ALCOHOL/MO/W.PET/CERES
5 CREAM (GRAM) TOPICAL AT BEDTIME
Refills: 0 | Status: DISCONTINUED | OUTPATIENT
Start: 2020-07-02 | End: 2020-07-07

## 2020-07-02 RX ORDER — CHLORHEXIDINE GLUCONATE 213 G/1000ML
1 SOLUTION TOPICAL
Refills: 0 | Status: DISCONTINUED | OUTPATIENT
Start: 2020-07-02 | End: 2020-07-07

## 2020-07-02 RX ADMIN — SODIUM CHLORIDE 75 MILLILITER(S): 9 INJECTION INTRAMUSCULAR; INTRAVENOUS; SUBCUTANEOUS at 18:02

## 2020-07-02 RX ADMIN — SODIUM CHLORIDE 75 MILLILITER(S): 9 INJECTION INTRAMUSCULAR; INTRAVENOUS; SUBCUTANEOUS at 15:50

## 2020-07-02 RX ADMIN — APIXABAN 2.5 MILLIGRAM(S): 2.5 TABLET, FILM COATED ORAL at 18:02

## 2020-07-02 RX ADMIN — CEFTRIAXONE 100 MILLIGRAM(S): 500 INJECTION, POWDER, FOR SOLUTION INTRAMUSCULAR; INTRAVENOUS at 16:00

## 2020-07-02 RX ADMIN — SODIUM CHLORIDE 75 MILLILITER(S): 9 INJECTION INTRAMUSCULAR; INTRAVENOUS; SUBCUTANEOUS at 21:12

## 2020-07-02 RX ADMIN — QUETIAPINE FUMARATE 100 MILLIGRAM(S): 200 TABLET, FILM COATED ORAL at 18:02

## 2020-07-02 RX ADMIN — Medication 5 MILLIGRAM(S): at 21:12

## 2020-07-02 RX ADMIN — CEFTRIAXONE 100 MILLIGRAM(S): 500 INJECTION, POWDER, FOR SOLUTION INTRAMUSCULAR; INTRAVENOUS at 18:02

## 2020-07-02 NOTE — ED PROVIDER NOTE - CARE PLAN
Principal Discharge DX:	Acute encephalopathy  Secondary Diagnosis:	SHARON (acute kidney injury)  Secondary Diagnosis:	UTI (urinary tract infection)

## 2020-07-02 NOTE — ED ADULT TRIAGE NOTE - CHIEF COMPLAINT QUOTE
Patient BIBA for weakness. Is on antibiotics for UTI and family noted increased weakness today. Patient is alert and able to answer questions in triage.

## 2020-07-02 NOTE — ED ADULT NURSE NOTE - INTERVENTIONS DEFINITIONS
[place] to call system/Instruct patient to call for assistance/Stretcher in lowest position, wheels locked, appropriate side rails in place/Call bell, personal items and telephone within reach/Monitor for mental status changes and reorient to person, place, and time

## 2020-07-02 NOTE — ED PROVIDER NOTE - ATTENDING CONTRIBUTION TO CARE
89yoF with h/o dementia on psych meds, HTN, hypothyroidism, DVT on Eliquis, presents with UTI, per son Ron to me via phone, has been lethargic for the past few days. Now decreased UOP and strong odor to urine and worsening confusion, son also states unsure if this is 2/2 the psych meds she is on. Multiple past Morganella infections. Denies SOB, CP, vomiting, fever, or complaint of pain. Pt herself is poorly responsive and unable to provide further hx. On exam, afebrile, hemodynamically stable, saturating well on RA, NAD, nontoxic appearing, laying in bed sleeping, eyes closed, breathing comfortably, no work of or agonal breathing, requires sternal rub to fully arouse, head NCAT, EOMI grossly, anicteric, MMM, no JVD, RRR, nml S1/S2, no m/r/g, lungs CTAB, no w/r/r, abd soft, NT, ND, nml BS, no rebound or guarding, when aroused is O to name only, otherwise states articulate words with no coherent sequence, CN's 3-12 grossly intact, motor 5/5 in all extrems, MAY spontaneously, no leg cyanosis or edema, skin warm, well perfused, no rashes or hives. No focal deficits and context low suspicion for CVA as well. No SOB/WOB or respiratory findings/sx's with low suspicion for PE. No abdominal tenderness or GI symptoms. No arrhythmia. 89yoF with h/o dementia on psych meds, HTN, hypothyroidism, DVT on Eliquis, presents with UTI, per son Ron to me via phone, has been lethargic for the past few days. Now decreased UOP and strong odor to urine and worsening confusion, son also states unsure if this is 2/2 the psych meds she is on. Multiple past Morganella infections. Denies SOB, CP, vomiting, fever, or complaint of pain. Pt herself is poorly responsive and unable to provide further hx. On exam, afebrile, hemodynamically stable, saturating well on RA, NAD, nontoxic appearing, laying in bed sleeping, eyes closed, breathing comfortably, no work of or agonal breathing, requires sternal rub to fully arouse, head NCAT, EOMI grossly, anicteric, MMM, no JVD, RRR, nml S1/S2, no m/r/g, lungs CTAB, no w/r/r, abd soft, NT, ND, nml BS, no rebound or guarding, when aroused is O to name only, otherwise states articulate words with no coherent sequence, CN's 3-12 grossly intact, motor 5/5 in all extrems, MAY spontaneously, no leg cyanosis or edema, skin warm, well perfused, no rashes or hives. No focal deficits and context low suspicion for CVA as well. No SOB/WOB or respiratory findings/sx's with low suspicion for PE. No abdominal tenderness or GI symptoms. No arrhythmia. No new anemia. Mild SHARON 89yoF with h/o dementia on psych meds, HTN, hypothyroidism, DVT on Eliquis, presents with UTI, per son Ron to me via phone, has been lethargic for the past few days. Now decreased UOP and strong odor to urine and worsening confusion, son also states unsure if this is 2/2 the psych meds she is on. Multiple past Morganella infections. Denies SOB, CP, vomiting, fever, or complaint of pain. Pt herself is poorly responsive and unable to provide further hx. On exam, afebrile, hemodynamically stable, saturating well on RA, NAD, nontoxic appearing, laying in bed sleeping, eyes closed, breathing comfortably, no work of or agonal breathing, requires sternal rub to fully arouse, head NCAT, EOMI grossly, anicteric, MMM, no JVD, RRR, nml S1/S2, no m/r/g, lungs CTAB, no w/r/r, abd soft, NT, ND, nml BS, no rebound or guarding, when aroused is O to name only, otherwise states articulate words with no coherent sequence, CN's 3-12 grossly intact, motor 5/5 in all extrems, MAY spontaneously, no leg cyanosis or edema, skin warm, well perfused, no rashes or hives. No focal deficits and context low suspicion for CVA as well. No SOB/WOB or respiratory findings/sx's with low suspicion for PE. No abdominal tenderness or GI symptoms. No arrhythmia. No new anemia. Mild SHARON and will treat for presumptive UTI by hx, reviewed past urine cultures with no growth here so do not have susceptibility. Low clinical suspicion for meningitis and no fever or meningeal signs. Patient well appearing, hemodynamically stable. Admitted to internal medicine for further monitoring, w/u, and care.

## 2020-07-02 NOTE — H&P ADULT - NSHPSOCIALHISTORY_GEN_ALL_CORE
Lives with: (  ) alone  ( x ) children   (  ) spouse   (  ) parents  (  ) other  Recent Travel: No recent travel  Substance Use (street drugs): ( x ) never used  (  ) other:  Tobacco Usage:  ( x  ) never smoked   (   ) former smoker   (   ) current smoker  (     ) pack year  Alcohol Usage: None    She ambulates with a cane at baseline and eats 3 meals a day, and wears diapers. Has home health aid come daily to help with changing and cleaning the patient.

## 2020-07-02 NOTE — H&P ADULT - ASSESSMENT
89 y/o F with PMH of HTN, hypothyroidism, severe Alzheimers dementia, DVT on eliquis, recent diagnosis of acute cystitis on day 3 of macrobid, A&O X0-1 at baseline but alert and interactive, presenting to the ED for AMS, decreased PO intake and decreased urine output. History was obtained from son, Ron Hawkins ([phone]) whom the patient lives with. Over the last week he has noticed the patient become more confused. Son says this has happened twice in the past where she was diagnosed with a UTI. She normally eats and drinks well but has not been drinking as much over the last few days and has had dark, very foul smelling urine. He brought patient to PCP who prescribed macrobid (currently day 3). Patients symptoms did not improve so he brought her here.     # AMS/decreased PO intake/decreased urine output - likely secondary to metabolic encephalopathy from acute cystitis  - In the ED, /97, HR 69. Temp 97.1 100% on RA. WBC 8.25. UA negative.   - CT head negative for acute intracranial pathology. Patient was started on rocephin and IVF.   - continue rocephin and IVF  - f/u Urine cultures.     Patient has SHARON (Cr 1.7 on admission, increased from baseline of approx 1.0). To be admitted to medicine for further management.     She ambulates with a cane at baseline and eats 3 meals a day, and wears diapers. Has home health aid come daily to help with changing and cleaning the patient. 89 y/o F with PMH of HTN, hypothyroidism, severe Alzheimers dementia, DVT on eliquis, recent diagnosis of acute cystitis on day 3 of macrobid, A&O X0-1 at baseline but alert and interactive, presenting to the ED for AMS, decreased PO intake and decreased urine output. History was obtained from son, Ron Hawkins ([phone]) whom the patient lives with. Over the last week he has noticed the patient become more confused. Son says this has happened twice in the past where she was diagnosed with a UTI. She normally eats and drinks well but has not been drinking as much over the last few days and has had dark, very foul smelling urine. He brought patient to PCP who prescribed macrobid (currently day 3). Patients symptoms did not improve so he brought her here.     # AMS/decreased PO intake/decreased urine output - likely secondary to metabolic encephalopathy from acute cystitis  - In the ED, /97, HR 69. Temp 97.1 100% on RA. WBC 8.25. UA negative.   - on day 3 of macrobid as outpatient for acute cystitis  - CT head negative for acute intracranial pathology. Patient was started on rocephin and IVF.   - continue rocephin and IVF  - f/u Urine cultures and blood culture  - continue to monitor    # SHARON (Cr 1.7 on admission, increased from baseline of approx 1.0) - likely pre-renal given decreased PO intake and dark, foul smelling urine reported by son  - continue IVF  - hold lisinopril   - dose reduce eliquis  - f/u AM BMP    # HTN (151/97 on admission)  - hold lisinopril for SHARON  - continue to monitor. If remains elevated can start low dose calcium channel blocker    # Hypothyroidism   - continue synthroid  - f/u TSH    # Severe Alzheimers Dementia (A&O X0-1 at baseline)  - She ambulates with a cane at baseline and eats 3 meals a day, and wears diapers. Has home health aid come daily to help with changing and cleaning the patient.   - continue home meds (list in chart)    DVT ppx: eliquis (dose reduced as patient meets 2/3 criteria for reduction, age and Cr)  GI ppx:  Not indicated   Diet:  S/S, 1:1, dysphagia diet   Activity: Inc as tolerated. f/u PT/OT  Dispo: home once back to baseline mental status and urine culture results  DNR/DNI. Goals of care discussion with son, Ron Hawkins () 89 y/o F with PMH of HTN, hypothyroidism, severe Alzheimers dementia, DVT on eliquis, recent diagnosis of acute cystitis on day 3 of macrobid, A&O X0-1 at baseline but alert and interactive, presenting to the ED for AMS, decreased PO intake and decreased urine output. History was obtained from son, Ron Hawkins ([phone]) whom the patient lives with. Over the last week he has noticed the patient become more confused. Son says this has happened twice in the past where she was diagnosed with a UTI. She normally eats and drinks well but has not been drinking as much over the last few days and has had dark, very foul smelling urine. He brought patient to PCP who prescribed macrobid (currently day 3). Patients symptoms did not improve so he brought her here.     # AMS/decreased PO intake/decreased urine output - likely secondary to metabolic encephalopathy from acute cystitis  - In the ED, /97, HR 69. Temp 97.1 100% on RA. WBC 8.25. UA negative.   - on day 3 of macrobid as outpatient for acute cystitis  - CT head negative for acute intracranial pathology. CXR negative for acute pathology. Patient was started on rocephin and IVF.   - continue rocephin and IVF  - f/u Urine cultures and blood culture  - continue to monitor    # SHARON (Cr 1.7 on admission, increased from baseline of approx 1.0) - likely pre-renal given decreased PO intake and dark, foul smelling urine reported by son  - continue IVF  - hold lisinopril   - dose reduce eliquis  - f/u AM BMP    # HTN (151/97 on admission)  - hold lisinopril for SHARON  - continue to monitor. If remains elevated can start low dose calcium channel blocker    # Hypothyroidism   - continue synthroid  - f/u TSH    # Severe Alzheimers Dementia (A&O X0-1 at baseline)  - She ambulates with a cane at baseline and eats 3 meals a day, and wears diapers. Has home health aid come daily to help with changing and cleaning the patient.   - continue home meds (list in chart)    DVT ppx: eliquis (dose reduced as patient meets 2/3 criteria for reduction, age and Cr)  GI ppx:  Not indicated   Diet:  S/S, 1:1, dysphagia diet   Activity: Inc as tolerated. f/u PT/OT  Dispo: home once back to baseline mental status and urine culture results  DNR/DNI. Goals of care discussion with son, Ron Hawkins () 89 y/o F with PMH of HTN, hypothyroidism, severe Alzheimers dementia, DVT on eliquis, recent diagnosis of acute cystitis on day 3 of macrobid, A&O X0-1 at baseline but alert and interactive, presenting to the ED for AMS, decreased PO intake and decreased urine output. History was obtained from son, Ron Hawkins ([phone]) whom the patient lives with. Over the last week he has noticed the patient become more confused. Son says this has happened twice in the past where she was diagnosed with a UTI. She normally eats and drinks well but has not been drinking as much over the last few days and has had dark, very foul smelling urine. He brought patient to PCP who prescribed macrobid (currently day 3). Patients symptoms did not improve so he brought her here.     # AMS/decreased PO intake/decreased urine output - likely secondary to metabolic encephalopathy from acute cystitis  - In the ED, /97, HR 69. Temp 97.1 100% on RA. WBC 8.25. UA negative.   - on day 3 of macrobid as outpatient for acute cystitis  - CT head negative for acute intracranial pathology. CXR negative for acute pathology. Patient was started on rocephin and IVF.   - continue rocephin and IVF  - f/u Urine cultures and blood culture  - continue to monitor    # SHARON (Cr 1.7 on admission, increased from baseline of approx 1.0) - likely pre-renal given decreased PO intake and dark, foul smelling urine reported by son  - continue IVF  - hold lisinopril   - dose reduce eliquis  - f/u AM BMP    # HTN (151/97 on admission)  - hold lisinopril for SHARON  - continue to monitor. If remains elevated can start low dose calcium channel blocker    # Hypothyroidism   - continue synthroid  - f/u TSH    # Severe Alzheimers Dementia (A&O X0-1 at baseline)  - She ambulates with a cane at baseline and eats 3 meals a day, and wears diapers. Has home health aid come daily to help with changing and cleaning the patient.   - continue home meds (list in chart)    # DVT (diagnosed on 3/7/20)  - [facility] Duplex Lower Ext Vein Scan: Acute right femoral and popliteal vein DVT. Right gastrocnemius vein thrombosis; Acute left popliteal vein DVT.  - continue dose reduced eliquis (meets 2/3 criteria)    DVT ppx: eliquis (dose reduced as patient meets 2/3 criteria for reduction, age and Cr)  GI ppx:  Not indicated   Diet:  S/S, 1:1, dysphagia diet   Activity: Inc as tolerated. f/u PT/OT  Dispo: home once back to baseline mental status and urine culture results  DNR/DNI. Goals of care discussion with son, Ron Hawkins () 89 y/o F with PMH of HTN, hypothyroidism, severe Alzheimers dementia, DVT on eliquis, recent diagnosis of acute cystitis on day 3 of macrobid, A&O X0-1 at baseline but alert and interactive, presenting to the ED for AMS, decreased PO intake and decreased urine output. History was obtained from son, Ron Hawkins ([phone]) whom the patient lives with. Over the last week he has noticed the patient become more confused. Son says this has happened twice in the past where she was diagnosed with a UTI. She normally eats and drinks well but has not been drinking as much over the last few days and has had dark, very foul smelling urine. He brought patient to PCP who prescribed macrobid (currently day 3). Patients symptoms did not improve so he brought her here.     # AMS/decreased PO intake/decreased urine output - likely secondary to metabolic encephalopathy from acute cystitis  - In the ED, /97, HR 69. Temp 97.1 100% on RA. WBC 8.25. UA negative.   - on day 3 of macrobid as outpatient for acute cystitis  - CT head negative for acute intracranial pathology. CXR negative for acute pathology. Patient was started on rocephin and IVF.   - continue rocephin and IVF  - f/u Urine cultures and blood culture  - continue to monitor    # SHARON (Cr 1.7 on admission, increased from baseline of approx 1.0) - likely pre-renal given decreased PO intake and dark, foul smelling urine reported by son  - continue IVF  - hold lisinopril   - dose reduce eliquis  - f/u AM BMP    # HTN (151/97 on admission)  - hold lisinopril for SHARON  - continue to monitor. If remains elevated can start low dose calcium channel blocker    # Hypothyroidism   - continue synthroid  - f/u TSH    # Severe Alzheimers Dementia (A&O X0-1 at baseline)  - She ambulates with a cane at baseline and eats 3 meals a day, and wears diapers. Has home health aid come daily to help with changing and cleaning the patient.   - continue home meds (list in chart)    # DVT (diagnosed on 3/7/20)  - [facility] Duplex Lower Ext Vein Scan: Acute right femoral and popliteal vein DVT. Right gastrocnemius vein thrombosis; Acute left popliteal vein DVT.  - continue dose reduced eliquis (meets 2/3 criteria)    DVT ppx: eliquis (dose reduced as patient meets 2/3 criteria for reduction, age and Cr)  GI ppx:  Not indicated   Diet:  S/S, 1:1, dysphagia diet   Activity: Inc as tolerated. f/u PT/OT  Dispo: home once back to baseline mental status and urine culture results  DNR/DNI. Goals of care discussion with son, Ron Hawkins () on 7/2. 89 y/o F with PMH of HTN, hypothyroidism, severe Alzheimers dementia, DVT on eliquis, recent diagnosis of acute cystitis on day 3 of macrobid, A&O X0-1 at baseline but alert and interactive, presenting to the ED for AMS, decreased PO intake and decreased urine output. History was obtained from son, Ron Hawkins ([phone]) whom the patient lives with. Over the last week he has noticed the patient become more confused. Son says this has happened twice in the past where she was diagnosed with a UTI. She normally eats and drinks well but has not been drinking as much over the last few days and has had dark, very foul smelling urine. He brought patient to PCP who prescribed macrobid (currently day 3). Patients symptoms did not improve so he brought her here.     # AMS/decreased PO intake/decreased urine output - likely secondary to metabolic encephalopathy from acute cystitis  - In the ED, /97, HR 69. Temp 97.1 100% on RA. WBC 8.25. UA negative.   - on day 3 of macrobid as outpatient for acute cystitis  - CT head negative for acute intracranial pathology. CXR negative for acute pathology. Patient was started on rocephin and IVF.   - continue rocephin and IVF  - f/u Urine cultures and blood culture  - continue to monitor    # SHARON (Cr 1.7 on admission, increased from baseline of approx 1.0) - likely pre-renal given decreased PO intake and dark, foul smelling urine reported by son  - continue IVF  - hold lisinopril   - dose reduce eliquis  - f/u AM BMP    # HTN (151/97 on admission)  - hold lisinopril for SHARON  - continue to monitor. If remains elevated can start low dose calcium channel blocker    # Hypothyroidism   - continue synthroid  - f/u TSH    # Severe Alzheimers Dementia (A&O X0-1 at baseline)  - She ambulates with a cane at baseline and eats 3 meals a day, and wears diapers. Has home health aid come daily to help with changing and cleaning the patient.   - continue home meds (list in chart)  - Pharmacy does not have latuda (it is non-formulary). son to bring in home meds tomorrow at noon.     # DVT (diagnosed on 3/7/20)  - [facility] Duplex Lower Ext Vein Scan: Acute right femoral and popliteal vein DVT. Right gastrocnemius vein thrombosis; Acute left popliteal vein DVT.  - continue dose reduced eliquis (meets 2/3 criteria)    DVT ppx: eliquis (dose reduced as patient meets 2/3 criteria for reduction, age and Cr)  GI ppx:  Not indicated   Diet:  S/S, 1:1, dysphagia diet   Activity: Inc as tolerated. f/u PT/OT  Dispo: home once back to baseline mental status and urine culture results  DNR/DNI. Goals of care discussion with son, Ron Hawkins () on 7/2. 89 y/o F with PMH of HTN, hypothyroidism, severe Alzheimers dementia, DVT on eliquis, recent diagnosis of acute cystitis on day 3 of macrobid, A&O X0-1 at baseline but alert and interactive, presenting to the ED for AMS, decreased PO intake and decreased urine output. History was obtained from son, Ron Hawkins ([phone]) whom the patient lives with. Over the last week he has noticed the patient become more confused. Son says this has happened twice in the past where she was diagnosed with a UTI. She normally eats and drinks well but has not been drinking as much over the last few days and has had dark, very foul smelling urine. He brought patient to PCP who prescribed macrobid (currently day 3). Patients symptoms did not improve so he brought her here.     # AMS/decreased PO intake/decreased urine output - likely secondary to metabolic encephalopathy from acute cystitis  - In the ED, /97, HR 69. Temp 97.1 100% on RA. WBC 8.25. UA negative.   - on day 3 of macrobid as outpatient for acute cystitis  - CT head negative for acute intracranial pathology. CXR negative for acute pathology. Patient was started on rocephin and IVF.   - continue rocephin and IVF  - f/u Urine cultures and blood culture  - continue to monitor    # SHARON (Cr 1.7 on admission, increased from baseline of approx 1.0) - likely pre-renal given decreased PO intake and dark, foul smelling urine reported by son  - continue IVF  - hold lisinopril   - dose reduce eliquis  - f/u AM BMP    # HTN (151/97 on admission)  - hold lisinopril for SHARON  - continue to monitor. If remains elevated can start low dose calcium channel blocker    # Hypothyroidism   - continue synthroid  - f/u TSH    # Severe Alzheimers Dementia (A&O X0-1 at baseline)  - She ambulates with a cane at baseline and eats 3 meals a day, and wears diapers. Has home health aid come daily to help with changing and cleaning the patient.   - continue home meds (list in chart)  - Pharmacy does not have latuda (it is non-formulary). son to bring in home meds tomorrow at noon.     # DVT (diagnosed on 3/7/20)  - [facility] Duplex Lower Ext Vein Scan: Acute right femoral and popliteal vein DVT. Right gastrocnemius vein thrombosis; Acute left popliteal vein DVT.  - continue dose reduced eliquis (meets 2/3 criteria)    DVT ppx: eliquis (dose reduced as patient meets 2/3 criteria for reduction, age and Cr)  GI ppx:  Not indicated   Diet: dysphagia diet   Activity: Inc as tolerated. f/u PT/OT  Dispo: home once back to baseline mental status and urine culture results  DNR/DNI. Goals of care discussion with son, Ron Hawkins () on 7/2.

## 2020-07-02 NOTE — ED PROVIDER NOTE - SKIN, MLM
MD at bedside with patient.  Results and follow-up discussed, patient verbalized understanding.  No acute distress voiced or observed.   Skin normal color for race, warm, dry and intact. No evidence of rash.

## 2020-07-02 NOTE — GOALS OF CARE CONVERSATION - ADVANCED CARE PLANNING - CONVERSATION DETAILS
Son said patient is DNR/DNI and has been so for quite some time. He says all paper work should already be on file. New MOLST form filled out and in patients chart. Would like to receive IV antibiotics

## 2020-07-02 NOTE — ED PROVIDER NOTE - CLINICAL SUMMARY MEDICAL DECISION MAKING FREE TEXT BOX
89yoF with h/o dementia on psych meds, HTN, hypothyroidism, DVT on Eliquis, presents with UTI, per son Ron to me via phone, has been lethargic for the past few days. Now decreased UOP and strong odor to urine and worsening confusion, son also states unsure if this is 2/2 the psych meds she is on. Multiple past Morganella infections. Denies SOB, CP, vomiting, fever, or complaint of pain. Pt herself is poorly responsive and unable to provide further hx. On exam, afebrile, hemodynamically stable, saturating well on RA, NAD, nontoxic appearing, laying in bed sleeping, eyes closed, breathing comfortably, no work of or agonal breathing, requires sternal rub to fully arouse, head NCAT, EOMI grossly, anicteric, MMM, no JVD, RRR, nml S1/S2, no m/r/g, lungs CTAB, no w/r/r, abd soft, NT, ND, nml BS, no rebound or guarding, when aroused is O to name only, otherwise states articulate words with no coherent sequence, CN's 3-12 grossly intact, motor 5/5 in all extrems, MAY spontaneously, no leg cyanosis or edema, skin warm, well perfused, no rashes or hives. No focal deficits and context low suspicion for CVA as well. No SOB/WOB or respiratory findings/sx's with low suspicion for PE. No abdominal tenderness or GI symptoms. No arrhythmia. No new anemia. Mild SHARON and will treat for presumptive UTI by hx, reviewed past urine cultures with no growth here so do not have susceptibility. Low clinical suspicion for meningitis and no fever or meningeal signs. Patient well appearing, hemodynamically stable. Admitted to internal medicine for further monitoring, w/u, and care.

## 2020-07-02 NOTE — H&P ADULT - NSICDXPASTMEDICALHX_GEN_ALL_CORE_FT
PAST MEDICAL HISTORY:  Dementia severe, Alzheimers    DVT, lower extremity on eliquis    Hypertension     Hypothyroidism

## 2020-07-02 NOTE — ED PROVIDER NOTE - OBJECTIVE STATEMENT
89 y.o. female with a PMH of dementia, hypothyroidism, HTN, and DVT presented to the ER for generalized weakness.  Pt has been on po Macrobid for the past five days for UTI.  No fever or other symptoms per son Wayne. 89 y.o. female with a PMH of dementia, hypothyroidism, HTN, and DVT presented to the ER for generalized weakness.  Pt has been on po Macrobid for the past five days for UTI.  No fever. (+) agitation per son Wayne.

## 2020-07-02 NOTE — H&P ADULT - HISTORY OF PRESENT ILLNESS
89 y/o F with PMH of HTN, hypothyroidism, severe Alzheimers dementia, DVT on eliquis, recent diagnosis of acute cystitis on day 3 of macrobid, A&O X0-1 at baseline but alert and interactive, presenting to the ED for AMS, decreased PO intake and decreased urine output. History was obtained from son, Ron Hawkins ([phone]) whom the patient lives with. She ambulates with a cane at baseline and eats 3 meals a day, and wears diapers. Has home health aid come daily to help with changing and cleaning the patient. Over the last week he has noticed the patient become more confused. Son says this has happened twice in the past where she was diagnosed with a UTI. She normally eats and drinks well but has not been drinking as much over the last few days and has had dark, very foul smelling urine. He brought patient to PCP who prescribed macrobid (currently day 3). Patients symptoms did not improve so he brought her here.     In the ED, /97, HR 69. Temp 97.1 100% on RA. WBC 8.25. UA negative. Urine cultures sent. CT head negative for acute intracranial pathology. Patient was started on rocephin and IVF. Patient has SHARON (Cr 1.7 on admission, increased from baseline of approx 1.0). To be admitted to medicine for further management. 89 y/o F with PMH of HTN, hypothyroidism, severe Alzheimers dementia, DVT on eliquis, recent diagnosis of acute cystitis on day 3 of macrobid, A&O X0-1 at baseline but alert and interactive, presenting to the ED for AMS, decreased PO intake and decreased urine output. History was obtained from son, Ron Hawkins ([phone]) whom the patient lives with. She ambulates with a cane at baseline and eats 3 meals a day, and wears diapers. Has home health aid come daily to help with changing and cleaning the patient. Over the last week he has noticed the patient become more confused. Son says this has happened twice in the past where she was diagnosed with a UTI. She normally eats and drinks well but has not been drinking as much over the last few days and has had dark, very foul smelling urine. He brought patient to PCP who prescribed macrobid (currently day 3). Patients symptoms did not improve so he brought her here.     In the ED, /97, HR 69. Temp 97.1 100% on RA. WBC 8.25. UA negative. Urine cultures sent. CT head negative for acute intracranial pathology. CXR no acute pathology. Patient was started on rocephin and IVF. Patient has SHARON (Cr 1.7 on admission, increased from baseline of approx 1.0). To be admitted to medicine for further management.

## 2020-07-02 NOTE — H&P ADULT - ATTENDING COMMENTS
89 y/o female, poor historian with PMH of HTN, hypothyroidism, severe Alzheimer dementia ( AAOx1 at baseline), DVT on Eliquis, recent diagnosis of acute cystitis on day 3 of Macrobid,  presenting to the ED for AMS, decreased PO intake and decreased urine output. As per son, Ron Hawkins ([phone]) patient was getting more confused over the past week and saw pcp who prescribed Macrobid. However, her symptoms did not improve and she has not been drinking as much over the last few days. Lives home with son, ambulates with cane/walker. social negx3    T(C): 36.2 (07-02-20 @ 15:28), Max: 36.6 (07-02-20 @ 10:36)  HR: 69 (07-02-20 @ 15:28) (69 - 85)  BP: 151/97 (07-02-20 @ 15:28) (147/67 - 151/97)  RR: 18 (07-02-20 @ 15:28) (17 - 18)  SpO2: 100% (07-02-20 @ 15:28) (97% - 100%)    GENERAL: Frail  HEAD:  Atraumatic, Normocephalic  EYES: EOMI, PERRLA, conjunctiva and sclera clear  ENT: Normal tympanic membrane. No nasal obstruction or discharge. No tonsillar exudate, swelling or erythema.  NECK: Supple, No JVD  CHEST/LUNG: Clear to auscultation bilaterally; No wheeze  HEART: Regular rate and rhythm; systolic murmur, no rubs, or gallops  ABDOMEN: Soft, Nontender, Nondistended; Bowel sounds present  EXTREMITIES:  2+ Peripheral Pulses, No clubbing, cyanosis, or edema  PSYCH: AAOx1 (self)  NEUROLOGY: non-focal  SKIN: No rashes or lesions    # Metabolic encephalopathy secondary to recent acute cystitis vs SHARON  - no sign of sepsis   - got Macrobid as outpatient for acute cystitis  - continue Rocephin - total abx duration of 7-10 days  - f/u Urine cultures and blood culture    # SHARON  - Cr 1.7 on admission (baseline 1.0)   - likely pre-renal given decreased PO intake  - continue IVF  - d/c lisinopril   - f/u AM BMP    # HTN  - 151/97  - d/c lisinopril for SHARON  - start amlodipine 5 mg   - continue to monitor    # Hypothyroidism   - c/w synthroid  - f/u TSH    # Severe Alzheimer Dementia  - AAX0-1 at baseline  - hold latuda -->can increase Cr    # DVT (diagnosed on 3/7/20)  - [facility] Duplex Lower Ext Vein Scan: Acute right femoral and popliteal vein DVT. Right gastrocnemius vein thrombosis; Acute left popliteal vein DVT.  - c/w with Eliquis 2.5mg q12     # DNR/DNI  - Goals of care discussion with son, Ron Hawkins () 89 y/o female, poor historian with PMH of HTN, hypothyroidism, severe Alzheimer dementia ( AAOx1 at baseline), DVT on Eliquis, recent diagnosis of acute cystitis on day 3 of Macrobid,  presenting to the ED for AMS, decreased PO intake and decreased urine output. As per son, Ron Hawkins [phone]) patient was getting more confused over the past week and saw pcp who prescribed Macrobid. However, her symptoms did not improve and she has not been drinking as much over the last few days. Lives home with son, ambulates with cane/walker. social negx3    T(C): 36.2 (07-02-20 @ 15:28), Max: 36.6 (07-02-20 @ 10:36)  HR: 69 (07-02-20 @ 15:28) (69 - 85)  BP: 151/97 (07-02-20 @ 15:28) (147/67 - 151/97)  RR: 18 (07-02-20 @ 15:28) (17 - 18)  SpO2: 100% (07-02-20 @ 15:28) (97% - 100%)    GENERAL: Frail  HEAD:  Atraumatic, Normocephalic  EYES: EOMI, PERRLA, conjunctiva and sclera clear  ENT: Normal tympanic membrane. No nasal obstruction or discharge. No tonsillar exudate, swelling or erythema.  NECK: Supple, No JVD  CHEST/LUNG: Clear to auscultation bilaterally; No wheeze  HEART: Regular rate and rhythm; systolic murmur, no rubs, or gallops  ABDOMEN: Soft, Nontender, Nondistended; Bowel sounds present  EXTREMITIES:  2+ Peripheral Pulses, No clubbing, cyanosis, or edema  PSYCH: AAOx0  NEUROLOGY: non-focal  SKIN: No rashes or lesions    # Metabolic encephalopathy secondary to recent acute cystitis vs SHARON  - no sign of sepsis   - got Macrobid as outpatient for acute cystitis  - continue Rocephin - total abx duration of 7-10 days  - f/u Urine cultures and blood culture    # SHARON  - Cr 1.7 on admission (baseline 1.0)   - likely pre-renal given decreased PO intake  - continue IVF  - d/c lisinopril   - f/u AM BMP    # HTN  - 151/97  - d/c lisinopril for SHARON  - start amlodipine 5 mg   - continue to monitor    # Hypothyroidism   - c/w synthroid  - f/u TSH    # Severe Alzheimer Dementia  - AAX0-1 at baseline  - hold latuda -->can increase Cr    # DVT (diagnosed on 3/7/20)  - [facility] Duplex Lower Ext Vein Scan: Acute right femoral and popliteal vein DVT. Right gastrocnemius vein thrombosis; Acute left popliteal vein DVT.  - c/w with Eliquis 2.5mg q12     # DNR/DNI  - Goals of care discussion with son, Ron Hawkins ()

## 2020-07-02 NOTE — PATIENT PROFILE ADULT - DISASTER - FUNCTIONAL SCREEN CURRENT LEVEL: COMMUNICATION, MLM
2 = difficulty understanding and speaking (not related to language barrier) 2 = difficulty understanding (not related to language barrier)

## 2020-07-02 NOTE — H&P ADULT - NSHPPHYSICALEXAM_GEN_ALL_CORE
GENERAL: frail elderly F in NAD, speaks in full sentences, no signs of respiratory distress  HEAD: Atraumatic  NECK: Supple  CHEST/LUNG: Clear to auscultation bilaterally; No wheeze or crackles  HEART: S1, S2; RRR; No murmurs, rubs, or gallops  ABDOMEN: BS+; Soft, Non-tender, Non-distended  EXTREMITIES:  2+ Peripheral Pulses, No clubbing, cyanosis, or edema  PSYCH: AAOx0-1  NEUROLOGY: non-focal  SKIN: No rashes or lesions

## 2020-07-02 NOTE — H&P ADULT - NSHPLABSRESULTS_GEN_ALL_CORE
11.5   8.25  )-----------( 293      ( 02 Jul 2020 11:37 )             35.1       07-02    139  |  101  |  48<H>  ----------------------------<  100<H>  5.1<H>   |  21  |  1.7<H>    Ca    9.7      02 Jul 2020 11:37    TPro  7.6  /  Alb  4.5  /  TBili  0.3  /  DBili  x   /  AST  27  /  ALT  14  /  AlkPhos  80  07-02      PT/INR - ( 02 Jul 2020 11:37 )   PT: 14.00 sec;   INR: 1.22 ratio         PTT - ( 02 Jul 2020 11:37 )  PTT:35.1 sec    RADIOLOGY:  < from: CT Head No Cont (07.02.20 @ 13:09) >      IMPRESSION:     1.  No evidence of acute intracranial pathology. Stable exam since 3/6/2020.    2.  Stable moderate chronic microvascular changes and parenchymal volume loss.        < end of copied text >

## 2020-07-03 LAB
ALBUMIN SERPL ELPH-MCNC: 3.6 G/DL — SIGNIFICANT CHANGE UP (ref 3.5–5.2)
ALP SERPL-CCNC: 68 U/L — SIGNIFICANT CHANGE UP (ref 30–115)
ALT FLD-CCNC: 14 U/L — SIGNIFICANT CHANGE UP (ref 0–41)
ANION GAP SERPL CALC-SCNC: 11 MMOL/L — SIGNIFICANT CHANGE UP (ref 7–14)
AST SERPL-CCNC: 25 U/L — SIGNIFICANT CHANGE UP (ref 0–41)
BASOPHILS # BLD AUTO: 0.1 K/UL — SIGNIFICANT CHANGE UP (ref 0–0.2)
BASOPHILS NFR BLD AUTO: 1.6 % — HIGH (ref 0–1)
BILIRUB SERPL-MCNC: <0.2 MG/DL — SIGNIFICANT CHANGE UP (ref 0.2–1.2)
BUN SERPL-MCNC: 30 MG/DL — HIGH (ref 10–20)
CALCIUM SERPL-MCNC: 8.7 MG/DL — SIGNIFICANT CHANGE UP (ref 8.5–10.1)
CHLORIDE SERPL-SCNC: 112 MMOL/L — HIGH (ref 98–110)
CO2 SERPL-SCNC: 21 MMOL/L — SIGNIFICANT CHANGE UP (ref 17–32)
CREAT SERPL-MCNC: 1.1 MG/DL — SIGNIFICANT CHANGE UP (ref 0.7–1.5)
CULTURE RESULTS: NO GROWTH — SIGNIFICANT CHANGE UP
EOSINOPHIL # BLD AUTO: 0.5 K/UL — SIGNIFICANT CHANGE UP (ref 0–0.7)
EOSINOPHIL NFR BLD AUTO: 7.9 % — SIGNIFICANT CHANGE UP (ref 0–8)
GLUCOSE SERPL-MCNC: 90 MG/DL — SIGNIFICANT CHANGE UP (ref 70–99)
HCT VFR BLD CALC: 32 % — LOW (ref 37–47)
HGB BLD-MCNC: 10.2 G/DL — LOW (ref 12–16)
IMM GRANULOCYTES NFR BLD AUTO: 0.3 % — SIGNIFICANT CHANGE UP (ref 0.1–0.3)
LYMPHOCYTES # BLD AUTO: 1.03 K/UL — LOW (ref 1.2–3.4)
LYMPHOCYTES # BLD AUTO: 16.2 % — LOW (ref 20.5–51.1)
MAGNESIUM SERPL-MCNC: 2 MG/DL — SIGNIFICANT CHANGE UP (ref 1.8–2.4)
MCHC RBC-ENTMCNC: 30.1 PG — SIGNIFICANT CHANGE UP (ref 27–31)
MCHC RBC-ENTMCNC: 31.9 G/DL — LOW (ref 32–37)
MCV RBC AUTO: 94.4 FL — SIGNIFICANT CHANGE UP (ref 81–99)
MONOCYTES # BLD AUTO: 0.73 K/UL — HIGH (ref 0.1–0.6)
MONOCYTES NFR BLD AUTO: 11.5 % — HIGH (ref 1.7–9.3)
NEUTROPHILS # BLD AUTO: 3.97 K/UL — SIGNIFICANT CHANGE UP (ref 1.4–6.5)
NEUTROPHILS NFR BLD AUTO: 62.5 % — SIGNIFICANT CHANGE UP (ref 42.2–75.2)
NRBC # BLD: 0 /100 WBCS — SIGNIFICANT CHANGE UP (ref 0–0)
PLATELET # BLD AUTO: 255 K/UL — SIGNIFICANT CHANGE UP (ref 130–400)
POTASSIUM SERPL-MCNC: 4.4 MMOL/L — SIGNIFICANT CHANGE UP (ref 3.5–5)
POTASSIUM SERPL-SCNC: 4.4 MMOL/L — SIGNIFICANT CHANGE UP (ref 3.5–5)
PROT SERPL-MCNC: 5.9 G/DL — LOW (ref 6–8)
RBC # BLD: 3.39 M/UL — LOW (ref 4.2–5.4)
RBC # FLD: 13.3 % — SIGNIFICANT CHANGE UP (ref 11.5–14.5)
SARS-COV-2 RNA SPEC QL NAA+PROBE: SIGNIFICANT CHANGE UP
SODIUM SERPL-SCNC: 144 MMOL/L — SIGNIFICANT CHANGE UP (ref 135–146)
SPECIMEN SOURCE: SIGNIFICANT CHANGE UP
TSH SERPL-MCNC: 1.09 UIU/ML — SIGNIFICANT CHANGE UP (ref 0.27–4.2)
WBC # BLD: 6.35 K/UL — SIGNIFICANT CHANGE UP (ref 4.8–10.8)
WBC # FLD AUTO: 6.35 K/UL — SIGNIFICANT CHANGE UP (ref 4.8–10.8)

## 2020-07-03 PROCEDURE — 99233 SBSQ HOSP IP/OBS HIGH 50: CPT

## 2020-07-03 RX ADMIN — APIXABAN 2.5 MILLIGRAM(S): 2.5 TABLET, FILM COATED ORAL at 17:19

## 2020-07-03 RX ADMIN — QUETIAPINE FUMARATE 100 MILLIGRAM(S): 200 TABLET, FILM COATED ORAL at 12:05

## 2020-07-03 RX ADMIN — SODIUM CHLORIDE 75 MILLILITER(S): 9 INJECTION INTRAMUSCULAR; INTRAVENOUS; SUBCUTANEOUS at 08:34

## 2020-07-03 RX ADMIN — SODIUM CHLORIDE 75 MILLILITER(S): 9 INJECTION INTRAMUSCULAR; INTRAVENOUS; SUBCUTANEOUS at 05:06

## 2020-07-03 RX ADMIN — SERTRALINE 25 MILLIGRAM(S): 25 TABLET, FILM COATED ORAL at 12:06

## 2020-07-03 RX ADMIN — AMLODIPINE BESYLATE 5 MILLIGRAM(S): 2.5 TABLET ORAL at 05:05

## 2020-07-03 RX ADMIN — APIXABAN 2.5 MILLIGRAM(S): 2.5 TABLET, FILM COATED ORAL at 05:05

## 2020-07-03 RX ADMIN — Medication 5 MILLIGRAM(S): at 21:13

## 2020-07-03 RX ADMIN — Medication 75 MICROGRAM(S): at 05:06

## 2020-07-03 RX ADMIN — Medication 1 TABLET(S): at 12:05

## 2020-07-03 NOTE — OCCUPATIONAL THERAPY INITIAL EVALUATION ADULT - PHYSICAL ASSIST/NONPHYSICAL ASSIST: EATING, OT EVAL
set-up required/supervision/verbal cues/nonverbal cues (demo/gestures)/longsitting in bed to complete self feeding tasks and container management

## 2020-07-03 NOTE — OCCUPATIONAL THERAPY INITIAL EVALUATION ADULT - RANGE OF MOTION EXAMINATION, UPPER EXTREMITY
bilateral UE Active ROM was WFL  (within functional limits)/bilateral UE Passive ROM was WFL  (within functional limits)/BUE AROM shoulder 3/4 range and noted with elbow contractures and general tightness limiting extension, elbow and wrist AROM WFL.

## 2020-07-03 NOTE — PHYSICAL THERAPY INITIAL EVALUATION ADULT - CRITERIA FOR SKILLED THERAPEUTIC INTERVENTIONS
rehab potential/impairments found/functional limitations in following categories/risk reduction/prevention/therapy frequency/predicted duration of therapy intervention

## 2020-07-03 NOTE — OCCUPATIONAL THERAPY INITIAL EVALUATION ADULT - PLANNED THERAPY INTERVENTIONS, OT EVAL
balance training/bed mobility training/fine motor coordination training/ADL retraining/strengthening/stretching/transfer training/ROM

## 2020-07-03 NOTE — OCCUPATIONAL THERAPY INITIAL EVALUATION ADULT - LIVES WITH, PROFILE
children/pt poor historian and unable to report PLOF and home environment. as per chart review, pt lives with children +SC +diaper +HHA for toileting and bathing

## 2020-07-03 NOTE — OCCUPATIONAL THERAPY INITIAL EVALUATION ADULT - IMPAIRMENTS CONTRIBUTING IMPAIRED BED MOBILITY, REHAB EVAL
decreased strength/impaired balance/cognition/impaired coordination/decreased flexibility/decreased ROM

## 2020-07-03 NOTE — OCCUPATIONAL THERAPY INITIAL EVALUATION ADULT - MANUAL MUSCLE TESTING RESULTS, REHAB EVAL
pt unable to follow commands during MMT 2* cognitive status. OT to further assess./not tested due to

## 2020-07-03 NOTE — OCCUPATIONAL THERAPY INITIAL EVALUATION ADULT - GROSSLY INTACT, SENSORY
Left UE/Right UE/Grossly Intact/appears intact via sensory screen with pt seated EOB and throughout functional ADL tasks, however pt inconsistent with command following throughout. OT to further assess.

## 2020-07-03 NOTE — OCCUPATIONAL THERAPY INITIAL EVALUATION ADULT - NS ASR FOLLOW COMMAND OT EVAL
50% of the time/able to follow single-step instructions/pt able to follow simple, one step commands though inconsistently and with moderate verbal cueing and physical demo from OT.

## 2020-07-03 NOTE — PHYSICAL THERAPY INITIAL EVALUATION ADULT - RANGE OF MOTION EXAMINATION, REHAB EVAL
B UE's: shoulder flex 3/4 range passively with tightness noted in shoulder joint and elbow joint with pt not extending. B LE's grossly WFL.

## 2020-07-03 NOTE — PHYSICAL THERAPY INITIAL EVALUATION ADULT - PERTINENT HX OF CURRENT PROBLEM, REHAB EVAL
Pt admitte for AMS, decreased PO intake, decrease urine output. Pt admitted for AMS, decreased PO intake, decrease urine output.

## 2020-07-03 NOTE — OCCUPATIONAL THERAPY INITIAL EVALUATION ADULT - PERTINENT HX OF CURRENT PROBLEM, REHAB EVAL
pt 89 y/o female PMH HTN, hypothyroidism, severe dementia A&Ox1 at baseline though alert and interactive, who presented to ED with son who reports AMS, decreased PO intake and decreased urine output.

## 2020-07-03 NOTE — PHYSICAL THERAPY INITIAL EVALUATION ADULT - AMBULATION SKILLS, REHAB EVAL
see comments as pt was not able to provide information on PLOF 2* to decreased cognition/Alzheimers dementia

## 2020-07-03 NOTE — OCCUPATIONAL THERAPY INITIAL EVALUATION ADULT - TRANSFER SAFETY CONCERNS NOTED: SIT/STAND, REHAB EVAL
inability to maintain weight-bearing restrictions w/o assist/decreased safety awareness/losing balance

## 2020-07-03 NOTE — PHYSICAL THERAPY INITIAL EVALUATION ADULT - ADDITIONAL COMMENTS
As per chart review pt lives with son Ron Hawkins ([phone]) whom the patient lives with. She ambulates with a cane at baseline and eats 3 meals a day, and wears diapers. Has home health aid come daily to help with changing and cleaning the patient. Over the last week he has noticed the patient become more confused. Son says this has happened twice in the past where she was diagnosed with a UTI. She normally eats and drinks well but has not been drinking as much over the last few days and has had dark, very foul smelling urine.

## 2020-07-03 NOTE — PROGRESS NOTE ADULT - SUBJECTIVE AND OBJECTIVE BOX
24H events:    Patient is a 89y old Female who presents with a chief complaint of AMS, decreased PO intake, decrease urine output (2020 16:24)    Primary diagnosis of Acute encephalopathy     Today is hospital day 1d. This morning patient was seen and examined at bedside, resting comfortably in bed.      PAST MEDICAL & SURGICAL HISTORY  DVT, lower extremity: on eliquis  Hypothyroidism  Hypertension  Dementia: severe, Alzheimers  H/O bilateral hip replacements    SOCIAL HISTORY:  Social History:  Lives with: (  ) alone  ( x ) children   (  ) spouse   (  ) parents  (  ) other  Recent Travel: No recent travel  Substance Use (street drugs): ( x ) never used  (  ) other:  Tobacco Usage:  ( x  ) never smoked   (   ) former smoker   (   ) current smoker  (     ) pack year  Alcohol Usage: None    She ambulates with a cane at baseline and eats 3 meals a day, and wears diapers. Has home health aid come daily to help with changing and cleaning the patient. (2020 16:24)      ALLERGIES:  sulfonamides (Other)    MEDICATIONS:  STANDING MEDICATIONS  amLODIPine   Tablet 5 milliGRAM(s) Oral daily  apixaban 2.5 milliGRAM(s) Oral every 12 hours  cefTRIAXone   IVPB 1000 milliGRAM(s) IV Intermittent every 24 hours  chlorhexidine 4% Liquid 1 Application(s) Topical <User Schedule>  levothyroxine 75 MICROGram(s) Oral daily  melatonin 5 milliGRAM(s) Oral at bedtime  multivitamin 1 Tablet(s) Oral daily  QUEtiapine 100 milliGRAM(s) Oral daily  sertraline 25 milliGRAM(s) Oral daily  sodium chloride 0.9%. 1000 milliLiter(s) IV Continuous <Continuous>    PRN MEDICATIONS    VITALS:   T(F): 97  HR: 54  BP: 137/63  RR: 18  SpO2: 98%    LABS:                        10.2   6.35  )-----------( 255      ( 2020 05:44 )             32.0     07-03    144  |  112<H>  |  30<H>  ----------------------------<  90  4.4   |  21  |  1.1    Ca    8.7      2020 05:44  Mg     2.0     07-03    TPro  5.9<L>  /  Alb  3.6  /  TBili  <0.2  /  DBili  x   /  AST  25  /  ALT  14  /  AlkPhos  68  07-03    PT/INR - ( 2020 11:37 )   PT: 14.00 sec;   INR: 1.22 ratio         PTT - ( 2020 11:37 )  PTT:35.1 sec  Urinalysis Basic - ( 2020 12:40 )    Color: Light Yellow / Appearance: Clear / S.011 / pH: x  Gluc: x / Ketone: Negative  / Bili: Negative / Urobili: <2 mg/dL   Blood: x / Protein: Negative / Nitrite: Negative   Leuk Esterase: Negative / RBC: x / WBC x   Sq Epi: x / Non Sq Epi: x / Bacteria: x        Troponin T, Serum: <0.01 ng/mL (20 @ 11:37)  Lactate, Blood: 1.1 mmol/L (20 @ 11:37)      CARDIAC MARKERS ( 2020 11:37 )  x     / <0.01 ng/mL / x     / x     / x          RADIOLOGY:  < from: CT Head No Cont (20 @ 13:09) >  IMPRESSION:   1.  No evidence of acute intracranial pathology. Stable exam since 3/6/2020.  2.  Stable moderate chronic microvascular changes and parenchymal volume loss.  < end of copied text >    	  PHYSICAL EXAM:  GENERAL: frail elderly F in NAD, speaks in full sentences, no signs of respiratory distress  HEAD: Atraumatic  NECK: Supple  CHEST/LUNG: Clear to auscultation bilaterally; No wheeze or crackles  HEART: S1, S2; RRR; No murmurs, rubs, or gallops  ABDOMEN: BS+; Soft, Non-tender, Non-distended  EXTREMITIES:  2+ Peripheral Pulses, No clubbing, cyanosis, or edema  PSYCH: AAOx0-1  NEUROLOGY: non-focal  SKIN: No rashes or lesions        Assessment and Plan:   Assessment:  · Assessment		  89 y/o F with PMH of HTN, hypothyroidism, severe Alzheimers dementia, DVT on eliquis, recent diagnosis of acute cystitis on day 3 of macrobid, A&O X0-1 at baseline but alert and interactive, presenting to the ED for AMS, decreased PO intake and decreased urine output. History was obtained from son, Ron Hawkins ([phone]) whom the patient lives with. Over the last week he has noticed the patient become more confused. Son says this has happened twice in the past where she was diagnosed with a UTI. She normally eats and drinks well but has not been drinking as much over the last few days and has had dark, very foul smelling urine. He brought patient to PCP who prescribed macrobid (currently day 3). Patients symptoms did not improve so he brought her here.     # AMS/decreased PO intake/decreased urine output - likely secondary to metabolic encephalopathy from UTI   - In the ED, /97, HR 69. Temp 97.1 100% on RA. WBC 8.25. UA negative.   - had Macrobid for 3 days as outpatient for acute cystitis  - CT head negative for acute intracranial pathology. CXR negative for acute pathology. Patient was started on rocephin and IVF.   - continue Rocephin and IVF  - f/u Urine cultures and blood culture  - continue to monitor    # SHARON (Cr 1.7 on admission, increased from baseline of approx 1.0) - likely pre-renal given decreased PO intake and dark, foul smelling urine reported by son  - improved   - continue IVF  - hold lisinopril   - dose reduce eliquis  - tend BMP     # HTN (151/97 on admission)  - hold lisinopril for SHARON  - continue to monitor    # Hypothyroidism   - continue synthroid  - f/u TSH    # Severe Alzheimers Dementia (A&O X0-1 at baseline)  - She ambulates with a cane at baseline and eats 3 meals a day, and wears diapers. Has home health aid come daily to help with changing and cleaning the patient.   - continue home meds (list in chart)  - Pharmacy does not have latuda (it is non-formulary). son to bring in home meds today at noon.     # DVT (diagnosed on 3/7/20)  - [facility] Duplex Lower Ext Vein Scan: Acute right femoral and popliteal vein DVT. Right gastrocnemius vein thrombosis; Acute left popliteal vein DVT.  - continue dose reduced eliquis (meets 2/3 criteria)    DVT ppx: eliquis (dose reduced as patient meets 2/3 criteria for reduction, age and Cr)  GI ppx:  Not indicated   Diet: dysphagia diet   Activity: Inc as tolerated. f/u PT/OT  Dispo: home once back to baseline mental status and urine culture results  DNR/DNI. Goals of care discussion with son, Ron Hawkins () on . 24H events:    Patient is a 89y old Female who presents with a chief complaint of AMS, decreased PO intake, decrease urine output (2020 16:24)    Primary diagnosis of Acute encephalopathy     Today is hospital day 1d. This morning patient was seen and examined at bedside, resting comfortably in bed.      PAST MEDICAL & SURGICAL HISTORY  DVT, lower extremity: on eliquis  Hypothyroidism  Hypertension  Dementia: severe, Alzheimers  H/O bilateral hip replacements    SOCIAL HISTORY:  Social History:  Lives with: (  ) alone  ( x ) children   (  ) spouse   (  ) parents  (  ) other  Recent Travel: No recent travel  Substance Use (street drugs): ( x ) never used  (  ) other:  Tobacco Usage:  ( x  ) never smoked   (   ) former smoker   (   ) current smoker  (     ) pack year  Alcohol Usage: None    She ambulates with a cane at baseline and eats 3 meals a day, and wears diapers. Has home health aid come daily to help with changing and cleaning the patient. (2020 16:24)      ALLERGIES:  sulfonamides (Other)    MEDICATIONS:  STANDING MEDICATIONS  amLODIPine   Tablet 5 milliGRAM(s) Oral daily  apixaban 2.5 milliGRAM(s) Oral every 12 hours  cefTRIAXone   IVPB 1000 milliGRAM(s) IV Intermittent every 24 hours  chlorhexidine 4% Liquid 1 Application(s) Topical <User Schedule>  levothyroxine 75 MICROGram(s) Oral daily  melatonin 5 milliGRAM(s) Oral at bedtime  multivitamin 1 Tablet(s) Oral daily  QUEtiapine 100 milliGRAM(s) Oral daily  sertraline 25 milliGRAM(s) Oral daily  sodium chloride 0.9%. 1000 milliLiter(s) IV Continuous <Continuous>      VITALS:   T(F): 97  HR: 54  BP: 137/63  RR: 18  SpO2: 98%    LABS:                        10.2   6.35  )-----------( 255      ( 2020 05:44 )             32.0     07-03    144  |  112<H>  |  30<H>  ----------------------------<  90  4.4   |  21  |  1.1    Ca    8.7      2020 05:44  Mg     2.0     07-03    TPro  5.9<L>  /  Alb  3.6  /  TBili  <0.2  /  DBili  x   /  AST  25  /  ALT  14  /  AlkPhos  68  07-03    PT/INR - ( 2020 11:37 )   PT: 14.00 sec;   INR: 1.22 ratio         PTT - ( 2020 11:37 )  PTT:35.1 sec  Urinalysis Basic - ( 2020 12:40 )    Color: Light Yellow / Appearance: Clear / S.011 / pH: x  Gluc: x / Ketone: Negative  / Bili: Negative / Urobili: <2 mg/dL   Blood: x / Protein: Negative / Nitrite: Negative   Leuk Esterase: Negative / RBC: x / WBC x   Sq Epi: x / Non Sq Epi: x / Bacteria: x        Troponin T, Serum: <0.01 ng/mL (20 @ 11:37)  Lactate, Blood: 1.1 mmol/L (20 @ 11:37)      CARDIAC MARKERS ( 2020 11:37 )  x     / <0.01 ng/mL / x     / x     / x          RADIOLOGY:  < from: CT Head No Cont (20 @ 13:09) >  IMPRESSION:   1.  No evidence of acute intracranial pathology. Stable exam since 3/6/2020.  2.  Stable moderate chronic microvascular changes and parenchymal volume loss.  < end of copied text >    	  PHYSICAL EXAM:  GENERAL: frail elderly F in NAD, speaks in full sentences, no signs of respiratory distress  HEAD: Atraumatic  NECK: Supple  CHEST/LUNG: Clear to auscultation bilaterally; No wheeze or crackles  HEART: S1, S2; RRR; No murmurs, rubs, or gallops  ABDOMEN: BS+; Soft, Non-tender, Non-distended  EXTREMITIES:  2+ Peripheral Pulses, No clubbing, cyanosis, or edema  PSYCH: AAOx0-1  NEUROLOGY: non-focal  SKIN: No rashes or lesions        Assessment and Plan:   Assessment:  · Assessment		  89 y/o F with PMH of HTN, hypothyroidism, severe Alzheimers dementia, DVT on eliquis, recent diagnosis of acute cystitis on day 3 of macrobid, A&O X0-1 at baseline but alert and interactive, presenting to the ED for AMS, decreased PO intake and decreased urine output. History was obtained from son, Ron Hawkins ([phone]) whom the patient lives with. Over the last week he has noticed the patient become more confused. Son says this has happened twice in the past where she was diagnosed with a UTI. She normally eats and drinks well but has not been drinking as much over the last few days and has had dark, very foul smelling urine. He brought patient to PCP who prescribed macrobid (currently day 3). Patients symptoms did not improve so he brought her here.     # AMS/decreased PO intake/decreased urine output - likely secondary to metabolic encephalopathy from UTI   - In the ED, /97, HR 69. Temp 97.1 100% on RA. WBC 8.25. UA negative.   - had Macrobid for 3 days as outpatient for acute cystitis  - CT head negative for acute intracranial pathology. CXR negative for acute pathology. Patient was started on rocephin and IVF.   - continue Rocephin and IVF  - f/u Urine cultures and blood culture  - continue to monitor    # SHARON (Cr 1.7 on admission, increased from baseline of approx 1.0) - likely pre-renal given decreased PO intake and dark, foul smelling urine reported by son  - improved   - continue IVF  - hold lisinopril   - dose reduce eliquis  - tend BMP     # HTN (151/97 on admission)  - hold lisinopril for SHARON  - continue to monitor    # Hypothyroidism   - continue synthroid  - f/u TSH    # Severe Alzheimers Dementia (A&O X0-1 at baseline)  - She ambulates with a cane at baseline and eats 3 meals a day, and wears diapers. Has home health aid come daily to help with changing and cleaning the patient.   - continue home meds (list in chart)  - Pharmacy does not have latuda (it is non-formulary). son to bring in home meds today at noon.     # DVT (diagnosed on 3/7/20)  - [facility] Duplex Lower Ext Vein Scan: Acute right femoral and popliteal vein DVT. Right gastrocnemius vein thrombosis; Acute left popliteal vein DVT.  - continue dose reduced eliquis (meets 2/3 criteria)    DVT ppx: eliquis (dose reduced as patient meets 2/3 criteria for reduction, age and Cr)  GI ppx:  Not indicated   Diet: dysphagia diet   Activity: Inc as tolerated. f/u PT/OT  Dispo: home once back to baseline mental status and urine culture results  DNR/DNI. Goals of care discussion with son, Ron Hawkins () on .    Attending Attestation  Pt has been seen and examined. Case and Plan discussed at rounds and agree with above documentation as corrected.  Encephalopathy - Unclear etiology although SHARON cr 0.9 - 1.7 abnormal CTBrain for Ischemic Changes  - AMS w/ abnormal CTH/Dementia/Unable to Ambulate w/ Walker alone  - Check MRI Brain and REEG   - PT / OT  - Will need NAE  - Family reachable incase of an emergency see [facility] and GOC DIscussion 24H events:    Patient is a 89y old Female who presents with a chief complaint of AMS, decreased PO intake, decrease urine output (2020 16:24)    Primary diagnosis of Acute encephalopathy     Today is hospital day 1d. This morning patient was seen and examined at bedside, resting comfortably in bed.      PAST MEDICAL & SURGICAL HISTORY  DVT, lower extremity: on eliquis  Hypothyroidism  Hypertension  Dementia: severe, Alzheimers  H/O bilateral hip replacements    SOCIAL HISTORY:  Social History:  Lives with: (  ) alone  ( x ) children   (  ) spouse   (  ) parents  (  ) other  Recent Travel: No recent travel  Substance Use (street drugs): ( x ) never used  (  ) other:  Tobacco Usage:  ( x  ) never smoked   (   ) former smoker   (   ) current smoker  (     ) pack year  Alcohol Usage: None    She ambulates with a cane at baseline and eats 3 meals a day, and wears diapers. Has home health aid come daily to help with changing and cleaning the patient. (2020 16:24)      ALLERGIES:  sulfonamides (Other)    MEDICATIONS:  STANDING MEDICATIONS  amLODIPine   Tablet 5 milliGRAM(s) Oral daily  apixaban 2.5 milliGRAM(s) Oral every 12 hours  cefTRIAXone   IVPB 1000 milliGRAM(s) IV Intermittent every 24 hours  chlorhexidine 4% Liquid 1 Application(s) Topical <User Schedule>  levothyroxine 75 MICROGram(s) Oral daily  melatonin 5 milliGRAM(s) Oral at bedtime  multivitamin 1 Tablet(s) Oral daily  QUEtiapine 100 milliGRAM(s) Oral daily  sertraline 25 milliGRAM(s) Oral daily  sodium chloride 0.9%. 1000 milliLiter(s) IV Continuous <Continuous>      VITALS:   T(F): 97  HR: 54  BP: 137/63  RR: 18  SpO2: 98%    LABS:                        10.2   6.35  )-----------( 255      ( 2020 05:44 )             32.0     07-03    144  |  112<H>  |  30<H>  ----------------------------<  90  4.4   |  21  |  1.1    Ca    8.7      2020 05:44  Mg     2.0     07-03    TPro  5.9<L>  /  Alb  3.6  /  TBili  <0.2  /  DBili  x   /  AST  25  /  ALT  14  /  AlkPhos  68  07-03    PT/INR - ( 2020 11:37 )   PT: 14.00 sec;   INR: 1.22 ratio         PTT - ( 2020 11:37 )  PTT:35.1 sec  Urinalysis Basic - ( 2020 12:40 )    Color: Light Yellow / Appearance: Clear / S.011 / pH: x  Gluc: x / Ketone: Negative  / Bili: Negative / Urobili: <2 mg/dL   Blood: x / Protein: Negative / Nitrite: Negative   Leuk Esterase: Negative / RBC: x / WBC x   Sq Epi: x / Non Sq Epi: x / Bacteria: x        Troponin T, Serum: <0.01 ng/mL (20 @ 11:37)  Lactate, Blood: 1.1 mmol/L (20 @ 11:37)      CARDIAC MARKERS ( 2020 11:37 )  x     / <0.01 ng/mL / x     / x     / x          RADIOLOGY:  < from: CT Head No Cont (20 @ 13:09) >  IMPRESSION:   1.  No evidence of acute intracranial pathology. Stable exam since 3/6/2020.  2.  Stable moderate chronic microvascular changes and parenchymal volume loss.  < end of copied text >    	  PHYSICAL EXAM:  GENERAL: frail elderly F in NAD, speaks in full sentences, no signs of respiratory distress  HEAD: Atraumatic  NECK: Supple  CHEST/LUNG: Clear to auscultation bilaterally; No wheeze or crackles  HEART: S1, S2; RRR; No murmurs, rubs, or gallops  ABDOMEN: BS+; Soft, Non-tender, Non-distended  EXTREMITIES:  2+ Peripheral Pulses, No clubbing, cyanosis, or edema  PSYCH: AAOx0-1  NEUROLOGY: non-focal  SKIN: No rashes or lesions        Assessment and Plan:   Assessment:  · Assessment		  89 y/o F with PMH of HTN, hypothyroidism, severe Alzheimers dementia, DVT on eliquis, recent diagnosis of acute cystitis on day 3 of macrobid, A&O X0-1 at baseline but alert and interactive, presenting to the ED for AMS, decreased PO intake and decreased urine output. History was obtained from son, Ron Hawkins ([phone]) whom the patient lives with. Over the last week he has noticed the patient become more confused. Son says this has happened twice in the past where she was diagnosed with a UTI. She normally eats and drinks well but has not been drinking as much over the last few days and has had dark, very foul smelling urine. He brought patient to PCP who prescribed macrobid (currently day 3). Patients symptoms did not improve so he brought her here.     # AMS/decreased PO intake/decreased urine output - likely secondary to metabolic encephalopathy from UTI   - In the ED, /97, HR 69. Temp 97.1 100% on RA. WBC 8.25. UA negative.   - had Macrobid for 3 days as outpatient for acute cystitis  - CT head negative for acute intracranial pathology. CXR negative for acute pathology. Patient was started on rocephin and IVF.   - continue Rocephin and IVF  - f/u Urine cultures and blood culture  - continue to monitor    # SHARON (Cr 1.7 on admission, increased from baseline of approx 1.0) - likely pre-renal given decreased PO intake and dark, foul smelling urine reported by son  - improved   - continue IVF  - hold lisinopril   - dose reduce eliquis  - tend BMP     # HTN (151/97 on admission)  - hold lisinopril for SHARON  - continue to monitor    # Hypothyroidism   - continue synthroid  - f/u TSH    # Severe Alzheimers Dementia (A&O X0-1 at baseline)  - She ambulates with a cane at baseline and eats 3 meals a day, and wears diapers. Has home health aid come daily to help with changing and cleaning the patient.   - continue home meds (list in chart)  - Pharmacy does not have latuda (it is non-formulary). son to bring in home meds today at noon.     # DVT (diagnosed on 3/7/20)  - [facility] Duplex Lower Ext Vein Scan: Acute right femoral and popliteal vein DVT. Right gastrocnemius vein thrombosis; Acute left popliteal vein DVT.  - continue dose reduced eliquis (meets 2/3 criteria)    DVT ppx: eliquis (dose reduced as patient meets 2/3 criteria for reduction, age and Cr)  GI ppx:  Not indicated   Diet: dysphagia diet   Activity: Inc as tolerated. f/u PT/OT  Dispo: home once back to baseline mental status and urine culture results  DNR/DNI. Goals of care discussion with son, Ron Hawkins () on .    Attending Attestation  Pt has been seen and examined. Case and Plan discussed at rounds and agree with above documentation as corrected.  Encephalopathy - Unclear etiology although SHARON cr 0.9 - 1.7 abnormal CTBrain for Ischemic Changes  - AMS w/ abnormal CTH/Dementia/Unable to Ambulate w/ Walker alone  - Check MRI Brain and REEG   - Recent UTI s/p Rx - UA = Clean no evidence of infection stop rocephin   - PT / OT  - Will need NAE  - Family reachable incase of an emergency see [facility] and GOC DIscussion 24H events:    Patient is a 89y old Female who presents with a chief complaint of AMS, decreased PO intake, decrease urine output (2020 16:24)    Primary diagnosis of Acute encephalopathy     Today is hospital day 1d. This morning patient was seen and examined at bedside, resting comfortably in bed.      PAST MEDICAL & SURGICAL HISTORY  DVT, lower extremity: on eliquis  Hypothyroidism  Hypertension  Dementia: severe, Alzheimers  H/O bilateral hip replacements    SOCIAL HISTORY:  Social History:  Lives with: (  ) alone  ( x ) children   (  ) spouse   (  ) parents  (  ) other  Recent Travel: No recent travel  Substance Use (street drugs): ( x ) never used  (  ) other:  Tobacco Usage:  ( x  ) never smoked   (   ) former smoker   (   ) current smoker  (     ) pack year  Alcohol Usage: None    She ambulates with a cane at baseline and eats 3 meals a day, and wears diapers. Has home health aid come daily to help with changing and cleaning the patient. (2020 16:24)      ALLERGIES:  sulfonamides (Other)    MEDICATIONS:  STANDING MEDICATIONS  amLODIPine   Tablet 5 milliGRAM(s) Oral daily  apixaban 2.5 milliGRAM(s) Oral every 12 hours  cefTRIAXone   IVPB 1000 milliGRAM(s) IV Intermittent every 24 hours  chlorhexidine 4% Liquid 1 Application(s) Topical <User Schedule>  levothyroxine 75 MICROGram(s) Oral daily  melatonin 5 milliGRAM(s) Oral at bedtime  multivitamin 1 Tablet(s) Oral daily  QUEtiapine 100 milliGRAM(s) Oral daily  sertraline 25 milliGRAM(s) Oral daily  sodium chloride 0.9%. 1000 milliLiter(s) IV Continuous <Continuous>      VITALS:   T(F): 97  HR: 54  BP: 137/63  RR: 18  SpO2: 98%    LABS:                        10.2   6.35  )-----------( 255      ( 2020 05:44 )             32.0     07-03    144  |  112<H>  |  30<H>  ----------------------------<  90  4.4   |  21  |  1.1    Ca    8.7      2020 05:44  Mg     2.0     07-03    TPro  5.9<L>  /  Alb  3.6  /  TBili  <0.2  /  DBili  x   /  AST  25  /  ALT  14  /  AlkPhos  68  07-03    PT/INR - ( 2020 11:37 )   PT: 14.00 sec;   INR: 1.22 ratio         PTT - ( 2020 11:37 )  PTT:35.1 sec  Urinalysis Basic - ( 2020 12:40 )    Color: Light Yellow / Appearance: Clear / S.011 / pH: x  Gluc: x / Ketone: Negative  / Bili: Negative / Urobili: <2 mg/dL   Blood: x / Protein: Negative / Nitrite: Negative   Leuk Esterase: Negative / RBC: x / WBC x   Sq Epi: x / Non Sq Epi: x / Bacteria: x        Troponin T, Serum: <0.01 ng/mL (20 @ 11:37)  Lactate, Blood: 1.1 mmol/L (20 @ 11:37)      CARDIAC MARKERS ( 2020 11:37 )  x     / <0.01 ng/mL / x     / x     / x          RADIOLOGY:  < from: CT Head No Cont (20 @ 13:09) >  IMPRESSION:   1.  No evidence of acute intracranial pathology. Stable exam since 3/6/2020.  2.  Stable moderate chronic microvascular changes and parenchymal volume loss.  < end of copied text >    	  PHYSICAL EXAM:  GENERAL: frail elderly F in NAD, speaks in full sentences, no signs of respiratory distress  HEAD: Atraumatic  NECK: Supple  CHEST/LUNG: Clear to auscultation bilaterally; No wheeze or crackles  HEART: S1, S2; RRR; No murmurs, rubs, or gallops  ABDOMEN: BS+; Soft, Non-tender, Non-distended  EXTREMITIES:  2+ Peripheral Pulses, No clubbing, cyanosis, or edema  PSYCH: AAOx0-1  NEUROLOGY: non-focal  SKIN: No rashes or lesions        Assessment and Plan:   Assessment:  · Assessment		  89 y/o F with PMH of HTN, hypothyroidism, severe Alzheimers dementia, DVT on eliquis, recent diagnosis of acute cystitis on day 3 of macrobid, A&O X0-1 at baseline but alert and interactive, presenting to the ED for AMS, decreased PO intake and decreased urine output. History was obtained from son, Ron Hawkins ([phone]) whom the patient lives with. Over the last week he has noticed the patient become more confused. Son says this has happened twice in the past where she was diagnosed with a UTI. She normally eats and drinks well but has not been drinking as much over the last few days and has had dark, very foul smelling urine. He brought patient to PCP who prescribed macrobid (currently day 3). Patients symptoms did not improve so he brought her here.     # AMS/decreased PO intake/decreased urine output - likely secondary to metabolic encephalopathy from UTI   - In the ED, /97, HR 69. Temp 97.1 100% on RA. WBC 8.25. UA negative.   - had Macrobid for 3 days as outpatient for acute cystitis  - CT head negative for acute intracranial pathology. CXR negative for acute pathology.   - f/u MRI head and eeg   - no sign of infection, will d/c Rocephin   - continue Rocephin and IVF  - f/u Urine cultures and blood culture  - continue to monitor    # SHARON (Cr 1.7 on admission, increased from baseline of approx 1.0) - likely pre-renal given decreased PO intake and dark, foul smelling urine reported by son  - improved   - continue IVF  - hold lisinopril   - dose reduce eliquis  - tend BMP     # HTN (151/97 on admission)  - hold lisinopril for SHARON  - continue to monitor    # Hypothyroidism   - continue synthroid  - f/u TSH    # Severe Alzheimers Dementia (A&O X0-1 at baseline)  - She ambulates with a cane at baseline and eats 3 meals a day, and wears diapers. Has home health aid come daily to help with changing and cleaning the patient.   - continue home meds (list in chart)  - Pharmacy does not have latuda (it is non-formulary). son to bring in home meds today at noon.     # DVT (diagnosed on 3/7/20)  - [facility] Duplex Lower Ext Vein Scan: Acute right femoral and popliteal vein DVT. Right gastrocnemius vein thrombosis; Acute left popliteal vein DVT.  - continue dose reduced eliquis (meets 2/3 criteria)    DVT ppx: eliquis (dose reduced as patient meets 2/3 criteria for reduction, age and Cr)  GI ppx:  Not indicated   Diet: dysphagia diet   Activity: Inc as tolerated. f/u PT/OT  Dispo: home once back to baseline mental status and urine culture results  DNR/DNI. Goals of care discussion with son, Ron Hawkins () on .    Attending Attestation  Pt has been seen and examined. Case and Plan discussed at rounds and agree with above documentation as corrected.  Encephalopathy - Unclear etiology although SHARON cr 0.9 - 1.7 abnormal CTBrain for Ischemic Changes  - AMS w/ abnormal CTH/Dementia/Unable to Ambulate w/ Walker alone  - Check MRI Brain and REEG   - Recent UTI s/p Rx - UA = Clean no evidence of infection stop rocephin   - PT / OT  - Will need NAE  - Family reachable incase of an emergency see CHINO and GOC DIscussion

## 2020-07-03 NOTE — OCCUPATIONAL THERAPY INITIAL EVALUATION ADULT - GENERAL OBSERVATIONS, REHAB EVAL
pt received and left supine in bed in NAD +IV drip +call bell within reach. pt not opposed to OT IE. RN present at end of session and made aware.

## 2020-07-03 NOTE — OCCUPATIONAL THERAPY INITIAL EVALUATION ADULT - PHYSICAL ASSIST/NONPHYSICAL ASSIST: GROOMING, OT EVAL
seated EOB to complete oral hygiene care/1 person assist/verbal cues/set-up required/nonverbal cues (demo/gestures)

## 2020-07-03 NOTE — PHYSICAL THERAPY INITIAL EVALUATION ADULT - GENERAL OBSERVATIONS, REHAB EVAL
8:30-8:52. Pt encountered semifowler in bed sleeping in NAD, awoken by PT, did not oppose PT and mobility. 8:30-8:52. Pt encountered semifowler in bed sleeping in NAD, awoken by PT, + IV lock L UE, Pt did not oppose PT and mobility.

## 2020-07-03 NOTE — OCCUPATIONAL THERAPY INITIAL EVALUATION ADULT - PHYSICAL ASSIST/NONPHYSICAL ASSIST:DRESS LOWER BODY, OT EVAL
verbal cues/nonverbal cues (demo/gestures)/1 person assist/set-up required/seated EOB to don/doff b/l socks

## 2020-07-04 LAB
ANION GAP SERPL CALC-SCNC: 11 MMOL/L — SIGNIFICANT CHANGE UP (ref 7–14)
BUN SERPL-MCNC: 17 MG/DL — SIGNIFICANT CHANGE UP (ref 10–20)
CALCIUM SERPL-MCNC: 8.6 MG/DL — SIGNIFICANT CHANGE UP (ref 8.5–10.1)
CHLORIDE SERPL-SCNC: 113 MMOL/L — HIGH (ref 98–110)
CO2 SERPL-SCNC: 23 MMOL/L — SIGNIFICANT CHANGE UP (ref 17–32)
CREAT SERPL-MCNC: 0.9 MG/DL — SIGNIFICANT CHANGE UP (ref 0.7–1.5)
GLUCOSE BLDC GLUCOMTR-MCNC: 128 MG/DL — HIGH (ref 70–99)
GLUCOSE SERPL-MCNC: 89 MG/DL — SIGNIFICANT CHANGE UP (ref 70–99)
HCT VFR BLD CALC: 32.9 % — LOW (ref 37–47)
HGB BLD-MCNC: 10.5 G/DL — LOW (ref 12–16)
MCHC RBC-ENTMCNC: 30.6 PG — SIGNIFICANT CHANGE UP (ref 27–31)
MCHC RBC-ENTMCNC: 31.9 G/DL — LOW (ref 32–37)
MCV RBC AUTO: 95.9 FL — SIGNIFICANT CHANGE UP (ref 81–99)
NRBC # BLD: 0 /100 WBCS — SIGNIFICANT CHANGE UP (ref 0–0)
PLATELET # BLD AUTO: 271 K/UL — SIGNIFICANT CHANGE UP (ref 130–400)
POTASSIUM SERPL-MCNC: 3.9 MMOL/L — SIGNIFICANT CHANGE UP (ref 3.5–5)
POTASSIUM SERPL-SCNC: 3.9 MMOL/L — SIGNIFICANT CHANGE UP (ref 3.5–5)
RBC # BLD: 3.43 M/UL — LOW (ref 4.2–5.4)
RBC # FLD: 13.4 % — SIGNIFICANT CHANGE UP (ref 11.5–14.5)
SODIUM SERPL-SCNC: 147 MMOL/L — HIGH (ref 135–146)
WBC # BLD: 7.01 K/UL — SIGNIFICANT CHANGE UP (ref 4.8–10.8)
WBC # FLD AUTO: 7.01 K/UL — SIGNIFICANT CHANGE UP (ref 4.8–10.8)

## 2020-07-04 PROCEDURE — 99232 SBSQ HOSP IP/OBS MODERATE 35: CPT

## 2020-07-04 PROCEDURE — 95816 EEG AWAKE AND DROWSY: CPT | Mod: 26

## 2020-07-04 PROCEDURE — 99222 1ST HOSP IP/OBS MODERATE 55: CPT

## 2020-07-04 RX ORDER — APIXABAN 2.5 MG/1
5 TABLET, FILM COATED ORAL
Refills: 0 | Status: DISCONTINUED | OUTPATIENT
Start: 2020-07-04 | End: 2020-07-07

## 2020-07-04 RX ADMIN — Medication 1 TABLET(S): at 11:11

## 2020-07-04 RX ADMIN — QUETIAPINE FUMARATE 100 MILLIGRAM(S): 200 TABLET, FILM COATED ORAL at 11:11

## 2020-07-04 RX ADMIN — APIXABAN 5 MILLIGRAM(S): 2.5 TABLET, FILM COATED ORAL at 17:06

## 2020-07-04 RX ADMIN — SERTRALINE 25 MILLIGRAM(S): 25 TABLET, FILM COATED ORAL at 11:11

## 2020-07-04 RX ADMIN — APIXABAN 2.5 MILLIGRAM(S): 2.5 TABLET, FILM COATED ORAL at 05:27

## 2020-07-04 RX ADMIN — Medication 1 MILLIGRAM(S): at 21:05

## 2020-07-04 RX ADMIN — Medication 75 MICROGRAM(S): at 05:27

## 2020-07-04 RX ADMIN — AMLODIPINE BESYLATE 5 MILLIGRAM(S): 2.5 TABLET ORAL at 05:27

## 2020-07-04 RX ADMIN — CHLORHEXIDINE GLUCONATE 1 APPLICATION(S): 213 SOLUTION TOPICAL at 05:27

## 2020-07-04 NOTE — CONSULT NOTE ADULT - ASSESSMENT
# Mental Status Worsening??- Likely Delirium with Severe Dementia  -Recommend getting MRI head non contrast.  -Maintain Aspiration and seizure precautions. Doubt any seizure process though.  -May check Vitamin B12, Folate and RPR.   -Recommend frequent re-orientation  -Infectious work up negative so far.    Final Recommendations to come from neurology Attending. # Mental Status Worsening??- Likely Delirium with Severe Dementia  -May get MRI head non contrast, if the medical team considers necessary. Likely will not change the management.  -Maintain Aspiration and seizure precautions. Doubt any seizure process though.  -May check Vitamin B12, Folate and RPR.   -Recommend frequent re-orientation  -Infectious work up negative so far.    Final Recommendations to come from neurology Attending.

## 2020-07-04 NOTE — PROGRESS NOTE ADULT - SUBJECTIVE AND OBJECTIVE BOX
MARLENE BOO 89y Female  MRN#: [id number]         SUBJECTIVE  Patient is a 89y old Female who presents with a chief complaint of AMS, decreased PO intake, decrease urine output (2020 11:36)  Currently admitted to medicine with the primary diagnosis of Acute encephalopathy        OBJECTIVE  PAST MEDICAL & SURGICAL HISTORY  DVT, lower extremity: on eliquis  Hypothyroidism  Hypertension  Dementia: severe, Alzheimers  H/O bilateral hip replacements    ALLERGIES:  sulfonamides (Other)    MEDICATIONS:  STANDING MEDICATIONS  amLODIPine   Tablet 5 milliGRAM(s) Oral daily  apixaban 2.5 milliGRAM(s) Oral every 12 hours  chlorhexidine 4% Liquid 1 Application(s) Topical <User Schedule>  levothyroxine 75 MICROGram(s) Oral daily  melatonin 5 milliGRAM(s) Oral at bedtime  multivitamin 1 Tablet(s) Oral daily  QUEtiapine 100 milliGRAM(s) Oral daily  sertraline 25 milliGRAM(s) Oral daily  sodium chloride 0.9%. 1000 milliLiter(s) IV Continuous <Continuous>    PRN MEDICATIONS      VITAL SIGNS: Last 24 Hours  T(C): 35.8 (2020 05:00), Max: 35.8 (2020 05:00)  T(F): 96.5 (2020 05:00), Max: 96.5 (2020 05:00)  HR: 62 (2020 05:00) (62 - 76)  BP: 163/70 (2020 05:00) (118/60 - 163/70)  BP(mean): --  RR: 20 (2020 05:00) (17 - 20)  SpO2: --    LABS:                        10.5   7.01  )-----------( 271      ( 2020 06:50 )             32.9     07-03    144  |  112<H>  |  30<H>  ----------------------------<  90  4.4   |  21  |  1.1    Ca    8.7      2020 05:44  Mg     2.0     07-03    TPro  5.9<L>  /  Alb  3.6  /  TBili  <0.2  /  DBili  x   /  AST  25  /  ALT  14  /  AlkPhos  68  07-03    PT/INR - ( 2020 11:37 )   PT: 14.00 sec;   INR: 1.22 ratio         PTT - ( 2020 11:37 )  PTT:35.1 sec  Urinalysis Basic - ( 2020 12:40 )    Color: Light Yellow / Appearance: Clear / S.011 / pH: x  Gluc: x / Ketone: Negative  / Bili: Negative / Urobili: <2 mg/dL   Blood: x / Protein: Negative / Nitrite: Negative   Leuk Esterase: Negative / RBC: x / WBC x   Sq Epi: x / Non Sq Epi: x / Bacteria: x            Culture - Urine (collected 2020 12:40)  Source: .Urine Catheterized  Final Report (2020 19:00):    No growth    Culture - Blood (collected 2020 11:37)  Source: .Blood Blood  Preliminary Report (2020 23:01):    No growth to date.      CARDIAC MARKERS ( 2020 11:37 )  x     / <0.01 ng/mL / x     / x     / x          RADIOLOGY:          PHYSICAL EXAM:  GENERAL: frail elderly F in NAD, speaks in full sentences, no signs of respiratory distress  HEAD: Atraumatic  NECK: Supple  CHEST/LUNG: Clear to auscultation bilaterally; No wheeze or crackles  HEART: S1, S2; RRR; No murmurs, rubs, or gallops  ABDOMEN: BS+; Soft, Non-tender, Non-distended  EXTREMITIES:  2+ Peripheral Pulses, No clubbing, cyanosis, or edema  PSYCH: AAOx0-1  NEUROLOGY: non-focal  SKIN: No rashes or lesions        Assessment and Plan:   89 y/o F with PMH of HTN, hypothyroidism, severe Alzheimers dementia, DVT on eliquis, recent diagnosis of acute cystitis on day 3 of macrobid, A&O X0-1 at baseline but alert and interactive, presenting to the ED for AMS, decreased PO intake and decreased urine output. History was obtained from son, Ron Boo ([phone]) whom the patient lives with. Over the last week he has noticed the patient become more confused. Son says this has happened twice in the past where she was diagnosed with a UTI. She normally eats and drinks well but has not been drinking as much over the last few days and has had dark, very foul smelling urine. He brought patient to PCP who prescribed macrobid (currently day 3). Patients symptoms did not improve so he brought her here.     # AMS/decreased PO intake/decreased urine output - likely secondary to metabolic encephalopathy from UTI   - In the ED, /97, HR 69. Temp 97.1 100% on RA. WBC 8.25. UA negative.   - had Macrobid for 3 days as outpatient for acute cystitis  - CT head negative for acute intracranial pathology. CXR negative for acute pathology.   - f/u MRI head and eeg   - no sign of infection, will d/c Rocephin   - continue Rocephin and IVF  - f/u Urine cultures and blood culture  - continue to monitor    # SHARON (Cr 1.7 on admission, increased from baseline of approx 1.0) - likely pre-renal given decreased PO intake and dark, foul smelling urine reported by son  - improved   - continue IVF  - hold lisinopril   - dose reduce eliquis  - tend BMP     # HTN (151/97 on admission)  - hold lisinopril for SHARON  - continue to monitor    # Hypothyroidism   - continue synthroid  - f/u TSH    # Severe Alzheimers Dementia (A&O X0-1 at baseline)  - She ambulates with a cane at baseline and eats 3 meals a day, and wears diapers. Has home health aid come daily to help with changing and cleaning the patient.   - continue home meds (list in chart)  - Pharmacy does not have latuda (it is non-formulary). son to bring in home meds today at noon.     # DVT (diagnosed on 3/7/20)  - [facility] Duplex Lower Ext Vein Scan: Acute right femoral and popliteal vein DVT. Right gastrocnemius vein thrombosis; Acute left popliteal vein DVT.  - continue dose reduced eliquis (meets 2/3 criteria)    DVT ppx: eliquis (dose reduced as patient meets 2/3 criteria for reduction, age and Cr)  GI ppx:  Not indicated   Diet: dysphagia diet   Activity: Inc as tolerated. f/u PT/OT  Dispo: home once back to baseline mental status and urine culture results  DNR/DNI. Goals of care discussion with son, Ron Boo () on . MARLENE BOO 89y Female  MRN#: [id number]         SUBJECTIVE  Patient is a 89y old Female who presents with a chief complaint of AMS, decreased PO intake, decrease urine output (2020 11:36)  Currently admitted to medicine with the primary diagnosis of Acute encephalopathy    Patient still confused. no other complaints        OBJECTIVE  PAST MEDICAL & SURGICAL HISTORY  DVT, lower extremity: on eliquis  Hypothyroidism  Hypertension  Dementia: severe, Alzheimers  H/O bilateral hip replacements    ALLERGIES:  sulfonamides (Other)    MEDICATIONS:  STANDING MEDICATIONS  amLODIPine   Tablet 5 milliGRAM(s) Oral daily  apixaban 2.5 milliGRAM(s) Oral every 12 hours  chlorhexidine 4% Liquid 1 Application(s) Topical <User Schedule>  levothyroxine 75 MICROGram(s) Oral daily  melatonin 5 milliGRAM(s) Oral at bedtime  multivitamin 1 Tablet(s) Oral daily  QUEtiapine 100 milliGRAM(s) Oral daily  sertraline 25 milliGRAM(s) Oral daily  sodium chloride 0.9%. 1000 milliLiter(s) IV Continuous <Continuous>    PRN MEDICATIONS      VITAL SIGNS: Last 24 Hours  T(C): 35.8 (2020 05:00), Max: 35.8 (2020 05:00)  T(F): 96.5 (2020 05:00), Max: 96.5 (2020 05:00)  HR: 62 (2020 05:00) (62 - 76)  BP: 163/70 (2020 05:00) (118/60 - 163/70)  BP(mean): --  RR: 20 (2020 05:00) (17 - 20)  SpO2: --    LABS:                        10.5   7.01  )-----------( 271      ( 2020 06:50 )             32.9     07-03    144  |  112<H>  |  30<H>  ----------------------------<  90  4.4   |  21  |  1.1    Ca    8.7      2020 05:44  Mg     2.0     07-03    TPro  5.9<L>  /  Alb  3.6  /  TBili  <0.2  /  DBili  x   /  AST  25  /  ALT  14  /  AlkPhos  68  07-03    PT/INR - ( 2020 11:37 )   PT: 14.00 sec;   INR: 1.22 ratio         PTT - ( 2020 11:37 )  PTT:35.1 sec  Urinalysis Basic - ( 2020 12:40 )    Color: Light Yellow / Appearance: Clear / S.011 / pH: x  Gluc: x / Ketone: Negative  / Bili: Negative / Urobili: <2 mg/dL   Blood: x / Protein: Negative / Nitrite: Negative   Leuk Esterase: Negative / RBC: x / WBC x   Sq Epi: x / Non Sq Epi: x / Bacteria: x            Culture - Urine (collected 2020 12:40)  Source: .Urine Catheterized  Final Report (2020 19:00):    No growth    Culture - Blood (collected 2020 11:37)  Source: .Blood Blood  Preliminary Report (2020 23:01):    No growth to date.      CARDIAC MARKERS ( 2020 11:37 )  x     / <0.01 ng/mL / x     / x     / x          RADIOLOGY:          PHYSICAL EXAM:  GENERAL: frail elderly F in NAD, speaks in full sentences, no signs of respiratory distress  HEAD: Atraumatic  NECK: Supple  CHEST/LUNG: Clear to auscultation bilaterally; No wheeze or crackles  HEART: S1, S2; RRR; No murmurs, rubs, or gallops  ABDOMEN: BS+; Soft, Non-tender, Non-distended  EXTREMITIES:  2+ Peripheral Pulses, No clubbing, cyanosis, or edema  PSYCH: AAOx0-1  NEUROLOGY: non-focal  SKIN: No rashes or lesions        Assessment and Plan:   89 y/o F with PMH of HTN, hypothyroidism, severe Alzheimers dementia, DVT on eliquis, recent diagnosis of acute cystitis on day 3 of macrobid, A&O X0-1 at baseline but alert and interactive, presenting to the ED for AMS, decreased PO intake and decreased urine output. History was obtained from son, Ron Boo ([phone]) whom the patient lives with. Over the last week he has noticed the patient become more confused. Son says this has happened twice in the past where she was diagnosed with a UTI. She normally eats and drinks well but has not been drinking as much over the last few days and has had dark, very foul smelling urine. He brought patient to PCP who prescribed macrobid (currently day 3). Patients symptoms did not improve so he brought her here.     # AMS/decreased PO intake/decreased urine output - likely secondary to metabolic encephalopathy from unknown cause (possible UTI but no signs of infection so abx stopped)  - In the ED, /97, HR 69. Temp 97.1 100% on RA. WBC 8.25. UA negative.   - had Macrobid for 3 days as outpatient for acute cystitis  - CT head negative for acute intracranial pathology. CXR negative for acute pathology.   - f/u MRI head and eeg   - Neurology consult  - no sign of infection, d/c Rocephin   - on IVF at 75/hr  - f/u Urine cultures and blood culture --> negative  - continue to monitor    # SHARON (Cr 1.7 on admission, increased from baseline of approx 1.0) - likely pre-renal given decreased PO intake and dark, foul smelling urine reported by son  - improved   - continue IVF  - hold lisinopril   - tend BMP     # HTN (151/97 on admission)  - hold lisinopril for SHARON  - continue to monitor    # Hypothyroidism   - continue synthroid  - f/u TSH    # Severe Alzheimers Dementia (A&O X0-1 at baseline)  - She ambulates with a cane at baseline and eats 3 meals a day, and wears diapers. Has home health aid come daily to help with changing and cleaning the patient.   - continue home meds (list in chart)  - Pharmacy does not have latuda (it is non-formulary). son to bring in home meds     # DVT (diagnosed on 3/7/20)  - [facility] Duplex Lower Ext Vein Scan: Acute right femoral and popliteal vein DVT. Right gastrocnemius vein thrombosis; Acute left popliteal vein DVT.  - continue eliquis (meets 2/3 criteria)    DVT ppx: eliquis   GI ppx:  Not indicated   Diet: dysphagia diet   Activity: Inc as tolerated. f/u PT/OT  Dispo: home once back to baseline mental status and urine culture results  DNR/DNI. Goals of care discussion with son, Ron Boo () on . MARLENE BOO 89y Female  MRN#: [id number]       SUBJECTIVE  Patient is a 89y old Female who presents with a chief complaint of AMS, decreased PO intake, decrease urine output (2020 11:36)  Currently admitted to medicine with the primary diagnosis of Acute encephalopathy    Patient still confused. no other complaints    OBJECTIVE  PAST MEDICAL & SURGICAL HISTORY  DVT, lower extremity: on eliquis  Hypothyroidism  Hypertension  Dementia: severe, Alzheimers  H/O bilateral hip replacements    ALLERGIES:  sulfonamides (Other)    MEDICATIONS:  STANDING MEDICATIONS  amLODIPine   Tablet 5 milliGRAM(s) Oral daily  apixaban 2.5 milliGRAM(s) Oral every 12 hours  chlorhexidine 4% Liquid 1 Application(s) Topical <User Schedule>  levothyroxine 75 MICROGram(s) Oral daily  melatonin 5 milliGRAM(s) Oral at bedtime  multivitamin 1 Tablet(s) Oral daily  QUEtiapine 100 milliGRAM(s) Oral daily  sertraline 25 milliGRAM(s) Oral daily  sodium chloride 0.9%. 1000 milliLiter(s) IV Continuous <Continuous>    VITAL SIGNS: Last 24 Hours  T(C): 35.8 (2020 05:00), Max: 35.8 (2020 05:00)  T(F): 96.5 (2020 05:00), Max: 96.5 (2020 05:00)  HR: 62 (2020 05:00) (62 - 76)  BP: 163/70 (2020 05:00) (118/60 - 163/70)  RR: 20 (2020 05:00) (17 - 20)      LABS:                        10.5   7.01  )-----------( 271      ( 2020 06:50 )             32.9     07-03    144  |  112<H>  |  30<H>  ----------------------------<  90  4.4   |  21  |  1.1    Ca    8.7      2020 05:44  Mg     2.0     07-03    TPro  5.9<L>  /  Alb  3.6  /  TBili  <0.2  /  DBili  x   /  AST  25  /  ALT  14  /  AlkPhos  68  07-03    PT/INR - ( 2020 11:37 )   PT: 14.00 sec;   INR: 1.22 ratio         PTT - ( 2020 11:37 )  PTT:35.1 sec  Urinalysis Basic - ( 2020 12:40 )    Color: Light Yellow / Appearance: Clear / S.011 / pH: x  Gluc: x / Ketone: Negative  / Bili: Negative / Urobili: <2 mg/dL   Blood: x / Protein: Negative / Nitrite: Negative   Leuk Esterase: Negative / RBC: x / WBC x   Sq Epi: x / Non Sq Epi: x / Bacteria: x      Culture - Urine (collected 2020 12:40)  Source: .Urine Catheterized  Final Report (2020 19:00):    No growth    Culture - Blood (collected 2020 11:37)  Source: .Blood Blood  Preliminary Report (2020 23:01):    No growth to date.      CARDIAC MARKERS ( 2020 11:37 )  x     / <0.01 ng/mL / x     / x     / x          RADIOLOGY:      PHYSICAL EXAM:  GENERAL: frail elderly F in NAD, speaks in full sentences, no signs of respiratory distress  HEAD: Atraumatic  NECK: Supple  CHEST/LUNG: Clear to auscultation bilaterally; No wheeze or crackles  HEART: S1, S2; RRR; No murmurs, rubs, or gallops  ABDOMEN: BS+; Soft, Non-tender, Non-distended  EXTREMITIES:  2+ Peripheral Pulses, No clubbing, cyanosis, or edema  PSYCH: AAOx0-1  NEUROLOGY: non-focal  SKIN: No rashes or lesions        Assessment and Plan:   89 y/o F with PMH of HTN, hypothyroidism, severe Alzheimers dementia, DVT on eliquis, recent diagnosis of acute cystitis on day 3 of macrobid, A&O X0-1 at baseline but alert and interactive, presenting to the ED for AMS, decreased PO intake and decreased urine output. History was obtained from son, Ron Boo ([phone]) whom the patient lives with. Over the last week he has noticed the patient become more confused. Son says this has happened twice in the past where she was diagnosed with a UTI. She normally eats and drinks well but has not been drinking as much over the last few days and has had dark, very foul smelling urine. He brought patient to PCP who prescribed macrobid (currently day 3). Patients symptoms did not improve so he brought her here.     # AMS/decreased PO intake/decreased urine output - likely secondary to metabolic encephalopathy from unknown cause (possible UTI but no signs of infection so abx stopped)  - In the ED, /97, HR 69. Temp 97.1 100% on RA. WBC 8.25. UA negative.   - had Macrobid for 3 days as outpatient for acute cystitis  - CT head negative for acute intracranial pathology. CXR negative for acute pathology.   - f/u MRI head and eeg   - Neurology consult  - no sign of infection, d/c Rocephin   - on IVF at 75/hr  - f/u Urine cultures and blood culture --> negative  - continue to monitor    # SHARON (Cr 1.7 on admission, increased from baseline of approx 1.0) - likely pre-renal given decreased PO intake and dark, foul smelling urine reported by son  - improved   - continue IVF  - hold lisinopril   - tend BMP     # HTN (151/97 on admission)  - hold lisinopril for SHARON  - continue to monitor    # Hypothyroidism   - continue synthroid  - f/u TSH    # Severe Alzheimers Dementia (A&O X0-1 at baseline)  - She ambulates with a cane at baseline and eats 3 meals a day, and wears diapers. Has home health aid come daily to help with changing and cleaning the patient.   - continue home meds (list in chart)  - Pharmacy does not have latuda (it is non-formulary). son to bring in home meds     # DVT (diagnosed on 3/7/20)  - [facility] Duplex Lower Ext Vein Scan: Acute right femoral and popliteal vein DVT. Right gastrocnemius vein thrombosis; Acute left popliteal vein DVT.  - continue eliquis (meets 2/3 criteria)    DVT ppx: eliquis   GI ppx:  Not indicated   Diet: dysphagia diet   Activity: Inc as tolerated. f/u PT/OT  Dispo: home once back to baseline mental status and urine culture results  DNR/DNI. Goals of care discussion with son, Ron Boo () on .    Attending Attestation    Pt seen and examined. Case and Plan discussed with team and agree with above.   Pt with Moderate Dementia to Advanced Dementia very talkative and w/ general weakness likely from age related debility and AMS of unclear etiology   DDX may include delirium, dehydration, seizures in a volnerable brain or possible stroke - unable to rely on PE. Does not follow commands and per family she is different.   Findings include: SHARON - unlikely to cause these symptoms (as very mild)  d/c IVF   Oral Hydration   f/u REEG and MRI  Neuro input appreciated  PT/OT/CM evals    Dx: Encephalopathy 2/2 Delirium/Dementia / SHARON possibly - f/u MRI/EEG     Dispo: d/c planning for NAE / f/u REEG and MRI Brain.

## 2020-07-04 NOTE — CONSULT NOTE ADULT - ATTENDING COMMENTS
Patient seen and examined and agree with above except as noted.  Patients history, notes, labs, imaging, vitals and meds reviewed personally.  Patient confused and speaks frequently but unable to follow her train of thought and appears to be random.  She intermittently will follow commands.  No focal weakness and sensory exam is unreliable  Has advanced dementia at baseline    Plan as above (MRI brain not likely necessary and likely delirium in setting of advanced dementia)  EEG may be helpful to rule out seizures and if no epileptiform discharges then no further neurological workup

## 2020-07-05 LAB
ALBUMIN SERPL ELPH-MCNC: 3.5 G/DL — SIGNIFICANT CHANGE UP (ref 3.5–5.2)
ALP SERPL-CCNC: 67 U/L — SIGNIFICANT CHANGE UP (ref 30–115)
ALT FLD-CCNC: 14 U/L — SIGNIFICANT CHANGE UP (ref 0–41)
ANION GAP SERPL CALC-SCNC: 9 MMOL/L — SIGNIFICANT CHANGE UP (ref 7–14)
AST SERPL-CCNC: 28 U/L — SIGNIFICANT CHANGE UP (ref 0–41)
BILIRUB SERPL-MCNC: 0.3 MG/DL — SIGNIFICANT CHANGE UP (ref 0.2–1.2)
BUN SERPL-MCNC: 21 MG/DL — HIGH (ref 10–20)
CALCIUM SERPL-MCNC: 8.8 MG/DL — SIGNIFICANT CHANGE UP (ref 8.5–10.1)
CHLORIDE SERPL-SCNC: 114 MMOL/L — HIGH (ref 98–110)
CO2 SERPL-SCNC: 23 MMOL/L — SIGNIFICANT CHANGE UP (ref 17–32)
CREAT SERPL-MCNC: 0.9 MG/DL — SIGNIFICANT CHANGE UP (ref 0.7–1.5)
GLUCOSE SERPL-MCNC: 85 MG/DL — SIGNIFICANT CHANGE UP (ref 70–99)
HCT VFR BLD CALC: 28.5 % — LOW (ref 37–47)
HGB BLD-MCNC: 9.2 G/DL — LOW (ref 12–16)
MAGNESIUM SERPL-MCNC: 1.8 MG/DL — SIGNIFICANT CHANGE UP (ref 1.8–2.4)
MCHC RBC-ENTMCNC: 30.7 PG — SIGNIFICANT CHANGE UP (ref 27–31)
MCHC RBC-ENTMCNC: 32.3 G/DL — SIGNIFICANT CHANGE UP (ref 32–37)
MCV RBC AUTO: 95 FL — SIGNIFICANT CHANGE UP (ref 81–99)
NRBC # BLD: 0 /100 WBCS — SIGNIFICANT CHANGE UP (ref 0–0)
PLATELET # BLD AUTO: 264 K/UL — SIGNIFICANT CHANGE UP (ref 130–400)
POTASSIUM SERPL-MCNC: 4.2 MMOL/L — SIGNIFICANT CHANGE UP (ref 3.5–5)
POTASSIUM SERPL-SCNC: 4.2 MMOL/L — SIGNIFICANT CHANGE UP (ref 3.5–5)
PROT SERPL-MCNC: 5.7 G/DL — LOW (ref 6–8)
RBC # BLD: 3 M/UL — LOW (ref 4.2–5.4)
RBC # FLD: 13.4 % — SIGNIFICANT CHANGE UP (ref 11.5–14.5)
SODIUM SERPL-SCNC: 146 MMOL/L — SIGNIFICANT CHANGE UP (ref 135–146)
WBC # BLD: 6.61 K/UL — SIGNIFICANT CHANGE UP (ref 4.8–10.8)
WBC # FLD AUTO: 6.61 K/UL — SIGNIFICANT CHANGE UP (ref 4.8–10.8)

## 2020-07-05 PROCEDURE — 99232 SBSQ HOSP IP/OBS MODERATE 35: CPT

## 2020-07-05 RX ADMIN — Medication 1 TABLET(S): at 15:47

## 2020-07-05 RX ADMIN — CHLORHEXIDINE GLUCONATE 1 APPLICATION(S): 213 SOLUTION TOPICAL at 05:15

## 2020-07-05 RX ADMIN — APIXABAN 5 MILLIGRAM(S): 2.5 TABLET, FILM COATED ORAL at 05:15

## 2020-07-05 RX ADMIN — Medication 75 MICROGRAM(S): at 05:15

## 2020-07-05 RX ADMIN — AMLODIPINE BESYLATE 5 MILLIGRAM(S): 2.5 TABLET ORAL at 05:15

## 2020-07-05 RX ADMIN — SERTRALINE 25 MILLIGRAM(S): 25 TABLET, FILM COATED ORAL at 15:47

## 2020-07-05 RX ADMIN — QUETIAPINE FUMARATE 100 MILLIGRAM(S): 200 TABLET, FILM COATED ORAL at 15:48

## 2020-07-05 RX ADMIN — Medication 5 MILLIGRAM(S): at 21:19

## 2020-07-05 RX ADMIN — APIXABAN 5 MILLIGRAM(S): 2.5 TABLET, FILM COATED ORAL at 19:33

## 2020-07-05 NOTE — CONSULT NOTE ADULT - ASSESSMENT
IMPRESSION: Rehab of dementia with acute decline/ delerium. Unknown etiology.     PRECAUTIONS: [  ] Cardiac  [  ] Respiratory  [  ] Seizures [  ] Contact Isolation  [  ] Droplet Isolation  [  ] Other    Weight Bearing Status: wbat    RECOMMENDATION:    Out of Bed to Chair     DVT/Decubiti Prophylaxis    REHAB PLAN:     [ x  ] Bedside P/T 3-5 times a week   [   ]   Bedside O/T  2-3 times a week             [   ] No Rehab Therapy Indicated                   [ x  ]  Speech Therapy - swallow eval for aspiration risk  Conditioning/ROM                                    ADL  Bed Mobility                                               Conditioning/ROM  Transfers                                                     Bed Mobility  Sitting /Standing Balance                         Transfers                                        Gait Training                                               Sitting/Standing Balance  Stair Training [   ]Applicable                    Home equipment Eval                                                                        Splinting  [   ] Only      GOALS:   ADL   [   ]   Independent                    Transfers  [   ] Independent                          Ambulation  [   ] Independent     [    ] With device                            [   ]  CG                                                         [   ]  CG                                                                  [   ] CG                            [  x  ] Min A                                                   [ x  ] Min A                                                              [ x  ] Min  A          DISCHARGE PLAN:   [   ]  Good candidate for Intensive Rehabilitation/Hospital based-4A [facility]                                             Will tolerate 3hrs Intensive Rehab Daily                                       [  x  ]  Short Term Rehab in Skilled Nursing Facility vs home with 24 hr care                                       [    ]  Home with Outpatient or  services                                         [    ]  Possible Candidate for Intensive Hospital based Rehab

## 2020-07-05 NOTE — PROGRESS NOTE ADULT - SUBJECTIVE AND OBJECTIVE BOX
MARLENE BOO 89y Female  MRN#: [id number]       SUBJECTIVE  Patient is a 89y old Female who presents with a chief complaint of AMS, decreased PO intake, decrease urine output (2020 11:36)  Currently admitted to medicine with the primary diagnosis of Acute encephalopathy    Patient still confused. no other complaints    OBJECTIVE  PAST MEDICAL & SURGICAL HISTORY  DVT, lower extremity: on eliquis  Hypothyroidism  Hypertension  Dementia: severe, Alzheimers  H/O bilateral hip replacements    ALLERGIES:  sulfonamides (Other)    MEDICATIONS:  STANDING MEDICATIONS  amLODIPine   Tablet 5 milliGRAM(s) Oral daily  apixaban 2.5 milliGRAM(s) Oral every 12 hours  chlorhexidine 4% Liquid 1 Application(s) Topical <User Schedule>  levothyroxine 75 MICROGram(s) Oral daily  melatonin 5 milliGRAM(s) Oral at bedtime  multivitamin 1 Tablet(s) Oral daily  QUEtiapine 100 milliGRAM(s) Oral daily  sertraline 25 milliGRAM(s) Oral daily  sodium chloride 0.9%. 1000 milliLiter(s) IV Continuous <Continuous>    VITAL SIGNS: Last 24 Hours  T(C): 36.4 (2020 05:32), Max: 36.4 (2020 05:32)  T(F): 97.6 (2020 05:32), Max: 97.6 (2020 05:32)  HR: 94 (2020 05:32) (72 - 94)  BP: 164/82 (2020 05:32) (157/81 - 164/82)  RR: 18 (2020 05:32) (18 - 20)    LABS:                        9.2    6.61  )-----------( 264      ( 2020 04:30 )             28.5       07-05    146  |  114<H>  |  21<H>  ----------------------------<  85  4.2   |  23  |  0.9    Ca    8.8      2020 04:30  Mg     1.8     07-05    TPro  5.7<L>  /  Alb  3.5  /  TBili  0.3  /  DBili  x   /  AST  28  /  ALT  14  /  AlkPhos  67  07-05    PT/INR - ( 2020 11:37 )   PT: 14.00 sec; INR: 1.22 ratio      PTT - ( 2020 11:37 )  PTT:35.1 sec  Urinalysis Basic - ( 2020 12:40 )    Color: Light Yellow / Appearance: Clear / S.011 / pH: x  Gluc: x / Ketone: Negative  / Bili: Negative / Urobili: <2 mg/dL   Blood: x / Protein: Negative / Nitrite: Negative   Leuk Esterase: Negative / RBC: x / WBC x   Sq Epi: x / Non Sq Epi: x / Bacteria: x    Culture - Urine (collected 2020 12:40)  Source: .Urine Catheterized  Final Report (2020 19:00):  No growth    Culture - Blood (collected 2020 11:37)  Source: .Blood Blood  Preliminary Report (2020 23:01):  No growth to date.    CARDIAC MARKERS ( 2020 11:37 )  x     / <0.01 ng/mL / x     / x     / x          PHYSICAL EXAM:  GENERAL: frail elderly F in NAD, speaks in full sentences, no signs of respiratory distress  HEAD: Atraumatic  NECK: Supple  CHEST/LUNG: Clear to auscultation bilaterally; No wheeze or crackles  HEART: S1, S2; RRR; No murmurs, rubs, or gallops  ABDOMEN: BS+; Soft, Non-tender, Non-distended  EXTREMITIES:  2+ Peripheral Pulses, No clubbing, cyanosis, or edema  PSYCH: AAOx0-1  NEUROLOGY: non-focal  SKIN: No rashes or lesions      Assessment and Plan:   89 y/o F with PMH of HTN, hypothyroidism, severe Alzheimers dementia, DVT on eliquis, recent diagnosis of acute cystitis on day 3 of macrobid, A&O X0-1 at baseline but alert and interactive, presenting to the ED for AMS, decreased PO intake and decreased urine output. History was obtained from son, Ron Boo ([phone]) whom the patient lives with. Over the last week he has noticed the patient become more confused. Son says this has happened twice in the past where she was diagnosed with a UTI. She normally eats and drinks well but has not been drinking as much over the last few days and has had dark, very foul smelling urine. He brought patient to PCP who prescribed macrobid (currently day 3). Patients symptoms did not improve so he brought her here.    # AMS/decreased PO intake/decreased urine output - likely secondary to metabolic encephalopathy from unknown cause (possible UTI but no signs of infection so abx stopped)  - In the ED, /97, HR 69. Temp 97.1 100% on RA. WBC 8.25. UA negative.   - had Macrobid for 3 days as outpatient for acute cystitis  - CT head negative for acute intracranial pathology. CXR negative for acute pathology.   - REEG WNL / NO NEED FOR MRI   - Neurology consult  - no sign of infection, d/c Rocephin   - on IVF at 75/hr  - Urine cultures and blood culture negative  - continue to monitor    # SHARON (Cr 1.7 on admission, increased from baseline of approx 1.0) - likely pre-renal given decreased PO intake and dark, foul smelling urine reported by son  - improved   - continue IVF  - hold lisinopril   - tend BMP     # HTN (151/97 on admission)  - hold lisinopril for SHARON  - continue to monitor    # Hypothyroidism   - continue synthroid  - f/u TSH    # Severe Alzheimers Dementia (A&O X0-1 at baseline)  - She ambulates with a cane at baseline and eats 3 meals a day, and wears diapers. Has home health aid come daily to help with changing and cleaning the patient.   - continue home meds (list in chart)  - Pharmacy does not have latuda (it is non-formulary). son to bring in home meds     # DVT (diagnosed on 3/7/20)  - [facility] Duplex Lower Ext Vein Scan: Acute right femoral and popliteal vein DVT. Right gastrocnemius vein thrombosis; Acute left popliteal vein DVT.  - continue eliquis (meets 2/3 criteria)    DVT ppx: eliquis   GI ppx:  Not indicated   Diet: dysphagia diet   Activity: Inc as tolerated. f/u PT/OT  Dispo: home once back to baseline mental status and urine culture results  DNR/DNI. Goals of care discussion with son, Ron Boo () on .    Summary:  Dx: Encephalopathy 2/2 Delirium/Dementia / Acute Renal Failure / Dehydration   - Neurology consult noted  - No need for MRI Brain   - Infection ruled out / Stroke ruled out / Meds reviewed   - REEG WNL  - f/u PT/OT/Physiatry consults     Dispo: d/c planning to likely [facility]

## 2020-07-05 NOTE — CONSULT NOTE ADULT - SUBJECTIVE AND OBJECTIVE BOX
HPI:  89 y/o F with PMH of HTN, hypothyroidism, severe Alzheimers dementia, DVT on eliquis, recent diagnosis of acute cystitis on day 3 of macrobid, A&O X0-1 at baseline but alert and interactive, presenting to the ED for AMS, decreased PO intake and decreased urine output. History was obtained from son, Ron Hawkins ([phone]) whom the patient lives with. She ambulates with a cane at baseline and eats 3 meals a day, and wears diapers. Has home health aid come daily to help with changing and cleaning the patient. Over the last week he has noticed the patient become more confused. Son says this has happened twice in the past where she was diagnosed with a UTI. She normally eats and drinks well but has not been drinking as much over the last few days and has had dark, very foul smelling urine. He brought patient to PCP who prescribed macrobid (currently day 3). Patients symptoms did not improve so he brought her here.     In the ED, /97, HR 69. Temp 97.1 100% on RA. WBC 8.25. UA negative. Urine cultures sent. CT head negative for acute intracranial pathology. CXR no acute pathology. Patient was started on rocephin and IVF. Patient has SHARON (Cr 1.7 on admission, increased from baseline of approx 1.0). To be admitted to medicine for further management. (02 Jul 2020 16:24)      PAST MEDICAL & SURGICAL HISTORY:  DVT, lower extremity: on eliquis  Hypothyroidism  Hypertension  Dementia: severe, Alzheimers  H/O bilateral hip replacements      Hospital Course:  w/u in progress. Medically stable    TODAY'S SUBJECTIVE & REVIEW OF SYMPTOMS: Unable. Severe cognitive deficits      MEDICATIONS  (STANDING):  amLODIPine   Tablet 5 milliGRAM(s) Oral daily  apixaban 5 milliGRAM(s) Oral two times a day  chlorhexidine 4% Liquid 1 Application(s) Topical <User Schedule>  levothyroxine 75 MICROGram(s) Oral daily  melatonin 5 milliGRAM(s) Oral at bedtime  multivitamin 1 Tablet(s) Oral daily  QUEtiapine 100 milliGRAM(s) Oral daily  sertraline 25 milliGRAM(s) Oral daily    MEDICATIONS  (PRN):      FAMILY HISTORY:  No pertinent family history in first degree relatives      Allergies    sulfonamides (Other)    Intolerances        SOCIAL HISTORY:     [  ] Etoh             - unknown  [  ] Smoking  [  ] Substance abuse     Home Environment:  [  ] Home Alone  [ x ] Lives with Family  [ x ] Home Health Aid    Dwelling:  [  ] Apartment  [ x ] Private House  [  ] Adult Home  [  ] Skilled Nursing Facility      [  ] Short Term  [  ] Long Term  [  ] Stairs       Elevator [  ]    FUNCTIONAL STATUS PTA: (Check all that apply)  Ambulation: [   ]Independent   [  x ] Requires Assistance/ sup   [  ] Dependent     [  ] Non-Ambulatory       Assistive Device: [x  ] SA Cane  [  ]  Q Cane  [  ] Walker  [  ]  Wheelchair  ADL : [  ] Independent    [ s  ] Requires Assistance    [  ]  Dependent       Vital Signs Last 24 Hrs  T(C): 35.4 (05 Jul 2020 13:38), Max: 36.4 (05 Jul 2020 05:32)  T(F): 95.8 (05 Jul 2020 13:38), Max: 97.6 (05 Jul 2020 05:32)  HR: 88 (05 Jul 2020 13:38) (88 - 94)  BP: 172/77 (05 Jul 2020 13:38) (157/81 - 172/77)  BP(mean): --  RR: 17 (05 Jul 2020 13:38) (17 - 18)  SpO2: --      PHYSICAL EXAM: eyes close. O x 1. Difficulty following simple commands. Cog wheel tremors and rigidity  GENERAL: NAD, cachectic  HEAD:  Atraumatic, Normocephalic  EYES: kept closed  CHEST/LUNG: Clear to auscultation  HEART: Regular rate and rhythm  ABDOMEN: Soft, Nontender, Nondistended  EXTREMITIES:  No clubbing, cyanosis, or edema  ROM:  [   ] WFL all extremities  [  ] Abnormal  - limited by tone and patient resisting    NERVOUS SYSTEM:   Cranial Nerves 2-12: [ x  ] intact  [  ] Abnormal   Motor Strength: [   ] WFL all extremities   [x  ] Abnormal  - unable to follow  Sensation: [   ] intact to light touch  [  ] Abnormal  - unable  Reflexes: [   ] Symmetric  [  ]  Abnormal     FUNCTIONAL STATUS:  Bed Mobility: [   ]Independent  [  ] Supervision  [ x ] Needs Assistance   [  ] N/A   Transfers: [   ] Independent  [  ] Supervision  [x  ] Needs Assistance  [  ]  N/A   Ambulation: [   ] Independent  [  ] Supervision  [  x] Needs Assistance  [  ] N/A   ADL: [   ] Independent  [x  ] Requires Assistance  [  ] N/A       LABS:                        9.2    6.61  )-----------( 264      ( 05 Jul 2020 04:30 )             28.5     07-05    146  |  114<H>  |  21<H>  ----------------------------<  85  4.2   |  23  |  0.9    Ca    8.8      05 Jul 2020 04:30  Mg     1.8     07-05    TPro  5.7<L>  /  Alb  3.5  /  TBili  0.3  /  DBili  x   /  AST  28  /  ALT  14  /  AlkPhos  67  07-05          RADIOLOGY & ADDITIONAL STUDIES:
Neurology Consult    Patient is a 89y old  Female who presents with a chief complaint of AMS, decreased PO intake, decrease urine output (2020 08:17)      HPI:  89 y/o F with PMH of HTN, hypothyroidism, severe Alzheimers dementia, DVT on eliquis, recent diagnosis of acute cystitis on day 3 of macrobid, A&O X0-1 at baseline but alert and interactive, presenting to the ED for AMS, decreased PO intake and decreased urine output. History was obtained from son, Ron Hawkins ([phone]) whom the patient lives with. She ambulates with a cane at baseline and eats 3 meals a day, and wears diapers. Has home health aid come daily to help with changing and cleaning the patient. Over the last week he has noticed the patient become more confused. Son says this has happened twice in the past where she was diagnosed with a UTI. She normally eats and drinks well but has not been drinking as much over the last few days and has had dark, very foul smelling urine. He brought patient to PCP who prescribed macrobid (currently day 3). Patients symptoms did not improve so he brought her here.     In the ED, /97, HR 69. Temp 97.1 100% on RA. WBC 8.25. UA negative. Urine cultures sent. CT head negative for acute intracranial pathology. CXR no acute pathology. Patient was started on rocephin and IVF. Patient has SHARON (Cr 1.7 on admission, increased from baseline of approx 1.0). To be admitted to medicine for further management. (2020 16:24)      PAST MEDICAL & SURGICAL HISTORY:  DVT, lower extremity: on eliquis  Hypothyroidism  Hypertension  Dementia: severe, Alzheimers  H/O bilateral hip replacements      FAMILY HISTORY:  No pertinent family history in first degree relatives      Social History: (-) x 3    Allergies    sulfonamides (Other)    Intolerances        MEDICATIONS  (STANDING):  amLODIPine   Tablet 5 milliGRAM(s) Oral daily  apixaban 5 milliGRAM(s) Oral two times a day  chlorhexidine 4% Liquid 1 Application(s) Topical <User Schedule>  levothyroxine 75 MICROGram(s) Oral daily  melatonin 5 milliGRAM(s) Oral at bedtime  multivitamin 1 Tablet(s) Oral daily  QUEtiapine 100 milliGRAM(s) Oral daily  sertraline 25 milliGRAM(s) Oral daily    MEDICATIONS  (PRN):      Review of systems:    Constitutional: as per HPI  Eyes: No eye pain or discharge  ENMT:  No difficulty hearing; No sinus or throat pain  Neck: No pain or stiffness  Respiratory: No cough, wheezing, chills or hemoptysis  Cardiovascular: No chest pain, palpitations, shortness of breath, dyspnea on exertion  Gastrointestinal: No abdominal pain, nausea, vomiting or hematemesis; No diarrhea or constipation.   Genitourinary: No dysuria, frequency, hematuria or incontinence  Neurological: As per HPI  Skin: No rashes or lesions   Endocrine: No heat or cold intolerance; No hair loss  Musculoskeletal: No joint pain or swelling  Psychiatric: No depression, anxiety, mood swings  Heme/Lymph: No easy bruising or bleeding gums    Vital Signs Last 24 Hrs  T(C): 35.8 (2020 05:00), Max: 35.8 (2020 05:00)  T(F): 96.5 (2020 05:00), Max: 96.5 (2020 05:00)  HR: 62 (2020 05:00) (62 - 76)  BP: 163/70 (2020 05:00) (118/60 - 163/70)  BP(mean): --  RR: 20 (2020 05:00) (17 - 20)  SpO2: --    Examination:  General:  Appearance is consistent with chronologic age.  No abnormal facies.  Gross skin survey within normal limits.    Cognitive/Language:  The patient is oriented to person only. Tangential thought process. Confused. Nondysarthric.    Eyes: intact VA, VFF.  EOMI w/o nystagmus   Face:  Facial sensation normal V1 - 3, no facial asymmetry.    Ears/Nose/Throat:  Hearing grossly intact b/l.  Motor examination:   Normal tone, bulk and range of motion.  No tenderness, twitching, tremors or involuntary movements.  Formal Muscle Strength Testing: (MRC grade R/L) 5/5 UE; 5/5 LE.  No observable drift.    Respiratory:  no audible wheezing or inspiratory stridor.  no use of accessory muscles.   Cardiac: pulse palpable, no audible bruits  Abdomen: supple, no guarding    Labs:   CBC Full  -  ( 2020 06:50 )  WBC Count : 7.01 K/uL  RBC Count : 3.43 M/uL  Hemoglobin : 10.5 g/dL  Hematocrit : 32.9 %  Platelet Count - Automated : 271 K/uL  Mean Cell Volume : 95.9 fL  Mean Cell Hemoglobin : 30.6 pg  Mean Cell Hemoglobin Concentration : 31.9 g/dL  Auto Neutrophil # : x  Auto Lymphocyte # : x  Auto Monocyte # : x  Auto Eosinophil # : x  Auto Basophil # : x  Auto Neutrophil % : x  Auto Lymphocyte % : x  Auto Monocyte % : x  Auto Eosinophil % : x  Auto Basophil % : x        147<H>  |  113<H>  |  17  ----------------------------<  89  3.9   |  23  |  0.9    Ca    8.6      2020 06:50  Mg     2.0     07-    TPro  5.9<L>  /  Alb  3.6  /  TBili  <0.2  /  DBili  x   /  AST  25  /  ALT  14  /  AlkPhos  68  07-03    LIVER FUNCTIONS - ( 2020 05:44 )  Alb: 3.6 g/dL / Pro: 5.9 g/dL / ALK PHOS: 68 U/L / ALT: 14 U/L / AST: 25 U/L / GGT: x           PT/INR - ( 2020 11:37 )   PT: 14.00 sec;   INR: 1.22 ratio         PTT - ( 2020 11:37 )  PTT:35.1 sec  Urinalysis Basic - ( 2020 12:40 )    Color: Light Yellow / Appearance: Clear / S.011 / pH: x  Gluc: x / Ketone: Negative  / Bili: Negative / Urobili: <2 mg/dL   Blood: x / Protein: Negative / Nitrite: Negative   Leuk Esterase: Negative / RBC: x / WBC x   Sq Epi: x / Non Sq Epi: x / Bacteria: x          Neuroimaging:  NCHCT: < from: CT Head No Cont (20 @ 13:09) >  IMPRESSION:     1.  No evidence of acute intracranial pathology. Stable exam since 3/6/2020.    2.  Stable moderate chronic microvascular changes and parenchymal volume loss.      < end of copied text >      20 @ 10:51

## 2020-07-06 LAB
ALBUMIN SERPL ELPH-MCNC: 3.7 G/DL — SIGNIFICANT CHANGE UP (ref 3.5–5.2)
ALP SERPL-CCNC: 72 U/L — SIGNIFICANT CHANGE UP (ref 30–115)
ALT FLD-CCNC: 14 U/L — SIGNIFICANT CHANGE UP (ref 0–41)
AMMONIA BLD-MCNC: <10 UMOL/L — LOW (ref 11–55)
ANION GAP SERPL CALC-SCNC: 12 MMOL/L — SIGNIFICANT CHANGE UP (ref 7–14)
AST SERPL-CCNC: 26 U/L — SIGNIFICANT CHANGE UP (ref 0–41)
BILIRUB SERPL-MCNC: 0.2 MG/DL — SIGNIFICANT CHANGE UP (ref 0.2–1.2)
BUN SERPL-MCNC: 18 MG/DL — SIGNIFICANT CHANGE UP (ref 10–20)
CALCIUM SERPL-MCNC: 9.4 MG/DL — SIGNIFICANT CHANGE UP (ref 8.5–10.1)
CHLORIDE SERPL-SCNC: 108 MMOL/L — SIGNIFICANT CHANGE UP (ref 98–110)
CO2 SERPL-SCNC: 24 MMOL/L — SIGNIFICANT CHANGE UP (ref 17–32)
CREAT SERPL-MCNC: 1 MG/DL — SIGNIFICANT CHANGE UP (ref 0.7–1.5)
GLUCOSE SERPL-MCNC: 64 MG/DL — LOW (ref 70–99)
HCT VFR BLD CALC: 33.4 % — LOW (ref 37–47)
HGB BLD-MCNC: 11 G/DL — LOW (ref 12–16)
MCHC RBC-ENTMCNC: 31.1 PG — HIGH (ref 27–31)
MCHC RBC-ENTMCNC: 32.9 G/DL — SIGNIFICANT CHANGE UP (ref 32–37)
MCV RBC AUTO: 94.4 FL — SIGNIFICANT CHANGE UP (ref 81–99)
NRBC # BLD: 0 /100 WBCS — SIGNIFICANT CHANGE UP (ref 0–0)
PLATELET # BLD AUTO: 280 K/UL — SIGNIFICANT CHANGE UP (ref 130–400)
POTASSIUM SERPL-MCNC: 4.3 MMOL/L — SIGNIFICANT CHANGE UP (ref 3.5–5)
POTASSIUM SERPL-SCNC: 4.3 MMOL/L — SIGNIFICANT CHANGE UP (ref 3.5–5)
PROT SERPL-MCNC: 6.1 G/DL — SIGNIFICANT CHANGE UP (ref 6–8)
RBC # BLD: 3.54 M/UL — LOW (ref 4.2–5.4)
RBC # FLD: 13.2 % — SIGNIFICANT CHANGE UP (ref 11.5–14.5)
SODIUM SERPL-SCNC: 144 MMOL/L — SIGNIFICANT CHANGE UP (ref 135–146)
TSH SERPL-MCNC: 2.15 UIU/ML — SIGNIFICANT CHANGE UP (ref 0.27–4.2)
WBC # BLD: 6.09 K/UL — SIGNIFICANT CHANGE UP (ref 4.8–10.8)
WBC # FLD AUTO: 6.09 K/UL — SIGNIFICANT CHANGE UP (ref 4.8–10.8)

## 2020-07-06 PROCEDURE — 99232 SBSQ HOSP IP/OBS MODERATE 35: CPT

## 2020-07-06 RX ORDER — SENNA PLUS 8.6 MG/1
2 TABLET ORAL AT BEDTIME
Refills: 0 | Status: DISCONTINUED | OUTPATIENT
Start: 2020-07-06 | End: 2020-07-07

## 2020-07-06 RX ADMIN — Medication 75 MICROGRAM(S): at 05:57

## 2020-07-06 RX ADMIN — CHLORHEXIDINE GLUCONATE 1 APPLICATION(S): 213 SOLUTION TOPICAL at 05:57

## 2020-07-06 RX ADMIN — SENNA PLUS 2 TABLET(S): 8.6 TABLET ORAL at 21:37

## 2020-07-06 RX ADMIN — APIXABAN 5 MILLIGRAM(S): 2.5 TABLET, FILM COATED ORAL at 05:57

## 2020-07-06 RX ADMIN — Medication 5 MILLIGRAM(S): at 21:37

## 2020-07-06 RX ADMIN — AMLODIPINE BESYLATE 5 MILLIGRAM(S): 2.5 TABLET ORAL at 05:57

## 2020-07-06 RX ADMIN — APIXABAN 5 MILLIGRAM(S): 2.5 TABLET, FILM COATED ORAL at 18:08

## 2020-07-06 NOTE — SWALLOW BEDSIDE ASSESSMENT ADULT - SWALLOW EVAL: DIAGNOSIS
Pt consumed and tolerated trials of thin liquids via cup and straw sip, puree, and mech soft ground textures. No overt s/s of aspiration/penetration noted at bedside. However, Pt required verbal cues and instruction for mastication of  mech soft ground textures.
mild-mod oral impairment no overt symptoms of laryngeal penetration/aspiration for puree consistencies and thin liquids

## 2020-07-06 NOTE — PROGRESS NOTE ADULT - SUBJECTIVE AND OBJECTIVE BOX
MARLENE BOO 89y Female  MRN#: [id number]         SUBJECTIVE  Patient is a 89y old Female who presents with a chief complaint of AMS, decreased PO intake, decrease urine output (05 Jul 2020 16:29)  Currently admitted to medicine with the primary diagnosis of Acute encephalopathy    Patient looks sleepy but AO. she was having breakfast. feels comfortbale        OBJECTIVE  PAST MEDICAL & SURGICAL HISTORY  DVT, lower extremity: on eliquis  Hypothyroidism  Hypertension  Dementia: severe, Alzheimers  H/O bilateral hip replacements    ALLERGIES:  sulfonamides (Other)    MEDICATIONS:  STANDING MEDICATIONS  amLODIPine   Tablet 5 milliGRAM(s) Oral daily  apixaban 5 milliGRAM(s) Oral two times a day  chlorhexidine 4% Liquid 1 Application(s) Topical <User Schedule>  levothyroxine 75 MICROGram(s) Oral daily  melatonin 5 milliGRAM(s) Oral at bedtime  multivitamin 1 Tablet(s) Oral daily  senna 2 Tablet(s) Oral at bedtime  sertraline 25 milliGRAM(s) Oral daily    PRN MEDICATIONS  bisacodyl 5 milliGRAM(s) Oral every 12 hours PRN      VITAL SIGNS: Last 24 Hours  T(C): 35.8 (06 Jul 2020 05:51), Max: 35.8 (06 Jul 2020 05:51)  T(F): 96.4 (06 Jul 2020 05:51), Max: 96.4 (06 Jul 2020 05:51)  HR: 72 (06 Jul 2020 05:51) (72 - 88)  BP: 114/56 (06 Jul 2020 05:51) (114/56 - 172/77)  BP(mean): --  RR: 18 (06 Jul 2020 05:51) (17 - 18)  SpO2: --    LABS:                        11.0   6.09  )-----------( 280      ( 06 Jul 2020 06:33 )             33.4     07-06    144  |  108  |  18  ----------------------------<  64<L>  4.3   |  24  |  1.0    Ca    9.4      06 Jul 2020 06:33  Mg     1.8     07-05    TPro  6.1  /  Alb  3.7  /  TBili  0.2  /  DBili  x   /  AST  26  /  ALT  14  /  AlkPhos  72  07-06                  RADIOLOGY:      PHYSICAL EXAM:  GENERAL: frail elderly F in NAD, speaks in full sentences, no signs of respiratory distress  HEAD: Atraumatic  NECK: Supple  CHEST/LUNG: Clear to auscultation bilaterally; No wheeze or crackles  HEART: S1, S2; RRR; No murmurs, rubs, or gallops  ABDOMEN: BS+; Soft, Non-tender, Non-distended  EXTREMITIES:  2+ Peripheral Pulses, No clubbing, cyanosis, or edema  PSYCH: AAOx0-1  NEUROLOGY: non-focal  SKIN: No rashes or lesions      Assessment and Plan:   89 y/o F with PMH of HTN, hypothyroidism, severe Alzheimers dementia, DVT on eliquis, recent diagnosis of acute cystitis on day 3 of macrobid, A&O X0-1 at baseline but alert and interactive, presenting to the ED for AMS, decreased PO intake and decreased urine output. History was obtained from son, Ron Boo ([phone]) whom the patient lives with. Over the last week he has noticed the patient become more confused. Son says this has happened twice in the past where she was diagnosed with a UTI. She normally eats and drinks well but has not been drinking as much over the last few days and has had dark, very foul smelling urine. He brought patient to PCP who prescribed macrobid (currently day 3). Patients symptoms did not improve so he brought her here.    # AMS/decreased PO intake/decreased urine output - likely secondary to metabolic encephalopathy from unknown cause (possible UTI but no signs of infection so abx stopped)  - In the ED, /97, HR 69. Temp 97.1 100% on RA. WBC 8.25. UA negative.   - had Macrobid for 3 days as outpatient for acute cystitis  - CT head negative for acute intracranial pathology. CXR negative for acute pathology.   - REEG WNL / NO NEED FOR MRI   - Neurology consult --> no need for MRI. likely delirium due to dementia  - no sign of infection, d/c Rocephin   - Urine cultures and blood culture negative  - continue to monitor  - check B12, Ammonia    # SHARON (Cr 1.7 on admission, increased from baseline of approx 1.0) - likely pre-renal given decreased PO intake and dark, foul smelling urine reported by son  - improved   - took IVF. off fluids for now  - hold lisinopril   - tend BMP     # HTN (151/97 on admission)  - hold lisinopril for SHARON  - continue to monitor  - continue amlodipine    # Hypothyroidism   - continue synthroid  - f/u TSH    # Severe Alzheimers Dementia (A&O X0-1 at baseline)  - She ambulates with a cane at baseline and eats 3 meals a day, and wears diapers. Has home health aid come daily to help with changing and cleaning the patient.   - continue home meds (list in chart)  - Pharmacy does not have latuda (it is non-formulary). son to bring in home meds     # DVT (diagnosed on 3/7/20)  - [facility] Duplex Lower Ext Vein Scan: Acute right femoral and popliteal vein DVT. Right gastrocnemius vein thrombosis; Acute left popliteal vein DVT.  - continue eliquis (meets 2/3 criteria)    DVT ppx: eliquis   GI ppx:  Not indicated   Diet: dysphagia diet   Activity: Inc as tolerated. f/u PT/OT  Dispo: needs SNF  DNR/DNI. Goals of care discussion with son, Ron Boo () on 7/2.

## 2020-07-06 NOTE — SWALLOW BEDSIDE ASSESSMENT ADULT - SWALLOW EVAL: RECOMMENDED FEEDING/EATING TECHNIQUES
maintain upright posture during/after eating for 30 mins/1:1 feed/small sips/bites/crush medication (when feasible)
check mouth frequently for oral residue/pocketing/position upright (90 degrees)

## 2020-07-06 NOTE — SWALLOW BEDSIDE ASSESSMENT ADULT - ASR SWALLOW ASPIRATION MONITOR
fever/cough/gurgly voice/oral hygiene/position upright (90Y)/pneumonia/change of breathing pattern/throat clearing/upper respiratory infection

## 2020-07-06 NOTE — SWALLOW BEDSIDE ASSESSMENT ADULT - SLP GENERAL OBSERVATIONS
lethargic. responsive to cues to maintain alertness for PO trials. intermittently verbal but confused. ox1
Pt asleep in bed, arousable to verbal stimuli, o2 via RA

## 2020-07-06 NOTE — SWALLOW BEDSIDE ASSESSMENT ADULT - COMMENTS
tolerated, however Pt's son requested Pt remain on puree diet SLP spoke with Pt's son after assessment. He requested Pt stay on puree diet for easy management as Pt is known to spit out her food at times. tolerated

## 2020-07-06 NOTE — SWALLOW BEDSIDE ASSESSMENT ADULT - SLP PERTINENT HISTORY OF CURRENT PROBLEM
hx of dementia. worsening confusion. suspected to have metabolic encephalopathy due to acute cystitis. md started patient on puree diet with thin liquids and consulted slp
hx of dementia. worsening confusion. suspected to have metabolic encephalopathy due to acute cystitis. 7/6 WBC - WNL

## 2020-07-07 ENCOUNTER — TRANSCRIPTION ENCOUNTER (OUTPATIENT)
Age: 85
End: 2020-07-07

## 2020-07-07 VITALS
HEART RATE: 105 BPM | TEMPERATURE: 97 F | DIASTOLIC BLOOD PRESSURE: 51 MMHG | RESPIRATION RATE: 18 BRPM | SYSTOLIC BLOOD PRESSURE: 107 MMHG

## 2020-07-07 LAB
CULTURE RESULTS: SIGNIFICANT CHANGE UP
SARS-COV-2 RNA SPEC QL NAA+PROBE: SIGNIFICANT CHANGE UP
SPECIMEN SOURCE: SIGNIFICANT CHANGE UP
VIT B12 SERPL-MCNC: 735 PG/ML — SIGNIFICANT CHANGE UP (ref 232–1245)

## 2020-07-07 PROCEDURE — 99239 HOSP IP/OBS DSCHRG MGMT >30: CPT

## 2020-07-07 RX ORDER — LURASIDONE HYDROCHLORIDE 40 MG/1
1 TABLET ORAL
Qty: 0 | Refills: 0 | DISCHARGE

## 2020-07-07 RX ORDER — QUETIAPINE FUMARATE 200 MG/1
1 TABLET, FILM COATED ORAL
Qty: 0 | Refills: 0 | DISCHARGE

## 2020-07-07 RX ORDER — LANOLIN ALCOHOL/MO/W.PET/CERES
1 CREAM (GRAM) TOPICAL
Qty: 0 | Refills: 0 | DISCHARGE
Start: 2020-07-07

## 2020-07-07 RX ORDER — AMLODIPINE BESYLATE 2.5 MG/1
1 TABLET ORAL
Qty: 0 | Refills: 0 | DISCHARGE
Start: 2020-07-07

## 2020-07-07 RX ORDER — LISINOPRIL 2.5 MG/1
1 TABLET ORAL
Qty: 0 | Refills: 0 | DISCHARGE

## 2020-07-07 RX ORDER — SENNA PLUS 8.6 MG/1
2 TABLET ORAL
Qty: 0 | Refills: 0 | DISCHARGE
Start: 2020-07-07

## 2020-07-07 RX ORDER — APIXABAN 2.5 MG/1
1 TABLET, FILM COATED ORAL
Qty: 60 | Refills: 3
Start: 2020-07-07 | End: 2020-11-03

## 2020-07-07 RX ADMIN — AMLODIPINE BESYLATE 5 MILLIGRAM(S): 2.5 TABLET ORAL at 06:07

## 2020-07-07 RX ADMIN — CHLORHEXIDINE GLUCONATE 1 APPLICATION(S): 213 SOLUTION TOPICAL at 06:07

## 2020-07-07 RX ADMIN — Medication 75 MICROGRAM(S): at 06:07

## 2020-07-07 RX ADMIN — SERTRALINE 25 MILLIGRAM(S): 25 TABLET, FILM COATED ORAL at 12:14

## 2020-07-07 RX ADMIN — Medication 1 TABLET(S): at 12:14

## 2020-07-07 RX ADMIN — APIXABAN 5 MILLIGRAM(S): 2.5 TABLET, FILM COATED ORAL at 06:07

## 2020-07-07 NOTE — DIETITIAN INITIAL EVALUATION ADULT. - OTHER INFO
89 y/o F with PMH of HTN, hypothyroidism, severe Alzheimers dementia, A&O X0-1 at baseline but alert and interactive, presenting to the ED for AMS, decreased PO intake and decreased urine output.  Likely secondary to metabolic encephalopathy from unknown cause (possible UTI but no signs of infection so abx stopped) Noted SHARON on admission, now resolved.

## 2020-07-07 NOTE — PROGRESS NOTE ADULT - ATTENDING COMMENTS
Patient seen and examined independently earlier today. Case discussed with housestaff, nursing, social work. I agree with most of the resident's note, physical exam, and plan except as below. Patient without complaints. Denies CP, SOB, AP. Remainder ROS unremarkable.     Gen: NAD, AA0x1, pleasant  CV: nl S1 S2  Resp: decreased BS b/l  GI: NT/ND/S +BS  MS: no c/c/e  Neuro: nonfocal    #Toxic-metabolic encephalopathy (resolved)/SHARON/Advanced dementia/HTN/Chronic DVT/Hypothyroidism  -cont current mgmt  -f/u B12, ammonita, RPR  -cont Eliquis  -awaiting safe dispo (SNF)        #Progress Note Handoff  Pending (specify):  Consults____Clinical improvement and stability____Tests__B12, NH4, RPR___, PT____x____  Pt/Family discussion: Pt/family informed and agree with the current plan  Disposition: Home______SNF___x____To be determined________ .     35 minutes spent on total encounter; more than 50% of the visit was spent counseling and/or coordinating care by the attending physician.
Patient seen and examined independently earlier today. Case discussed with housestaff, nursing, social work. I agree with most of the resident's note, physical exam, and plan except as below. Patient without complaints. Denies CP, SOB, AP. Remainder ROS unremarkable.     Gen: NAD, AA0x1, pleasant  CV: nl S1 S2  Resp: decreased BS b/l  GI: NT/ND/S +BS  MS: no c/c/e  Neuro: nonfocal    #Toxic-metabolic encephalopathy (resolved)/SHARON/Advanced dementia/HTN/Chronic DVT/Hypothyroidism  -cont current mgmt  -check B12, ammonita, RPR  -cont Eliquis  -awaiting safe dispo (SNF)        #Progress Note Handoff  Pending (specify):  Consults____Clinical improvement and stability____Tests__B12, NH4, RPR___, PT____x____  Pt/Family discussion: Pt/family informed and agree with the current plan  Disposition: Home______SNF___x____To be determined________

## 2020-07-07 NOTE — DISCHARGE NOTE PROVIDER - HOSPITAL COURSE
89 y/o F with PMH of HTN, hypothyroidism, severe Alzheimers dementia, DVT on eliquis, recent diagnosis of acute cystitis on day 3 of macrobid, A&O X0-1 at baseline but alert and interactive, presenting to the ED for AMS, decreased PO intake and decreased urine output. History was obtained from son, Ron Hawkins ([phone]) whom the patient lives with. Over the last week he has noticed the patient become more confused. Son says this has happened twice in the past where she was diagnosed with a UTI. She normally eats and drinks well but has not been drinking as much over the last few days and has had dark, very foul smelling urine. He brought patient to PCP who prescribed macrobid. Patients symptoms did not improve so he brought her here.        Hospital Course significant for the following:        # AMS/decreased PO intake/decreased urine output - likely secondary to metabolic encephalopathy from unknown cause (possible UTI but no signs of infection so abx stopped)    - In the ED, /97, HR 69. Temp 97.1 100% on RA. WBC 8.25. UA negative.     - had Macrobid for 3 days as outpatient for acute cystitis    - CT head negative for acute intracranial pathology. CXR negative for acute pathology.     - REEG WNL / NO NEED FOR MRI     - Neurology consult --> no need for MRI. likely delirium due to dementia    - no sign of infection, d/c Rocephin     - Urine cultures and blood culture negative    - check B12, Ammonia --> normal        # SHARON (Cr 1.7 on admission, increased from baseline of approx 1.0) - likely pre-renal given decreased PO intake and dark, foul smelling urine reported by son    - improved     - took IVF. off fluids for now    - ok to resume lisinopril on d/c            # HTN (151/97 on admission)    - continue lisinopril on d/c    - continue amlodipine        # Hypothyroidism     - continue synthroid    - f/u TSH --> normal        # Severe Alzheimers Dementia (A&O X0-1 at baseline)    - She ambulates with a cane at baseline and eats 3 meals a day, and wears diapers. Has home health aid come daily to help with changing and cleaning the patient.     - continue home meds (list in chart)            # DVT (diagnosed on 3/7/20)    - [facility] Duplex Lower Ext Vein Scan: Acute right femoral and popliteal vein DVT. Right gastrocnemius vein thrombosis; Acute left popliteal vein DVT.    - continue eliquis (meets 2/3 criteria)        D/C to SNF

## 2020-07-07 NOTE — DIETITIAN INITIAL EVALUATION ADULT. - PHYSICAL APPEARANCE
other (specify) Ht: 61"    Wt:     IBW:   +/-10%    BMI:   kg/m2    %IBW:   Skin: No pressure injuries per RN flow sheets  Edema: None noted per RN flow sheets

## 2020-07-07 NOTE — DISCHARGE NOTE PROVIDER - NSDCMRMEDTOKEN_GEN_ALL_CORE_FT
amLODIPine 5 mg oral tablet: 1 tab(s) orally once a day  apixaban 5 mg oral tablet: 1 tab(s) orally 2 times a day. to be started after eliquis 10 bid is finished  bisacodyl 5 mg oral delayed release tablet: 1 tab(s) orally every 12 hours, As needed, Constipation  Latuda 20 mg oral tablet: 1 tab(s) orally once a day in the AM  Latuda 60 mg oral tablet: 1 tab(s) orally once a day before dinner  levothyroxine 75 mcg (0.075 mg) oral tablet: 1 tab(s) orally once a day  lisinopril 5 mg oral tablet: 1 tab(s) orally once a day  melatonin 5 mg oral tablet: 1 tab(s) orally once a day (at bedtime)  Multiple Vitamins oral tablet: 1 tab(s) orally once a day  QUEtiapine 100 mg oral tablet: 1 tab(s) orally once a day before dinner  senna oral tablet: 2 tab(s) orally once a day (at bedtime)  sertraline 25 mg oral tablet: 1 tab(s) orally once a day amLODIPine 5 mg oral tablet: 1 tab(s) orally once a day  apixaban 5 mg oral tablet: 1 tab(s) orally 2 times a day  bisacodyl 5 mg oral delayed release tablet: 1 tab(s) orally every 12 hours, As needed, Constipation  levothyroxine 75 mcg (0.075 mg) oral tablet: 1 tab(s) orally once a day  melatonin 5 mg oral tablet: 1 tab(s) orally once a day (at bedtime)  Multiple Vitamins oral tablet: 1 tab(s) orally once a day  senna oral tablet: 2 tab(s) orally once a day (at bedtime)  sertraline 25 mg oral tablet: 1 tab(s) orally once a day

## 2020-07-07 NOTE — DISCHARGE NOTE PROVIDER - CARE PROVIDER_API CALL
Haley Guzman  GERIATRIC MEDICINE  [address] [place] [address]  Phone: [phone]  Fax: [phone]  Follow Up Time: 1 week

## 2020-07-07 NOTE — PROGRESS NOTE ADULT - SUBJECTIVE AND OBJECTIVE BOX
MARLENE BOO 89y Female  MRN#: [id number]         SUBJECTIVE  Patient is a 89y old Female who presents with a chief complaint of AMS, decreased PO intake, decrease urine output (06 Jul 2020 08:31)  Currently admitted to medicine with the primary diagnosis of Acute encephalopathy    no events overnight. patient reports no complaints this morning      OBJECTIVE  PAST MEDICAL & SURGICAL HISTORY  DVT, lower extremity: on eliquis  Hypothyroidism  Hypertension  Dementia: severe, Alzheimers  H/O bilateral hip replacements    ALLERGIES:  sulfonamides (Other)    MEDICATIONS:  STANDING MEDICATIONS  amLODIPine   Tablet 5 milliGRAM(s) Oral daily  apixaban 5 milliGRAM(s) Oral two times a day  chlorhexidine 4% Liquid 1 Application(s) Topical <User Schedule>  levothyroxine 75 MICROGram(s) Oral daily  melatonin 5 milliGRAM(s) Oral at bedtime  multivitamin 1 Tablet(s) Oral daily  senna 2 Tablet(s) Oral at bedtime  sertraline 25 milliGRAM(s) Oral daily    PRN MEDICATIONS  bisacodyl 5 milliGRAM(s) Oral every 12 hours PRN      VITAL SIGNS: Last 24 Hours  T(C): 36.3 (07 Jul 2020 05:00), Max: 36.8 (06 Jul 2020 21:57)  T(F): 97.4 (07 Jul 2020 05:00), Max: 98.2 (06 Jul 2020 21:57)  HR: 76 (07 Jul 2020 05:00) (76 - 102)  BP: 166/70 (07 Jul 2020 05:00) (125/58 - 166/70)  BP(mean): --  RR: 18 (07 Jul 2020 05:00) (18 - 20)  SpO2: 97% (07 Jul 2020 05:00) (97% - 97%)    LABS:                        11.0   6.09  )-----------( 280      ( 06 Jul 2020 06:33 )             33.4     07-06    144  |  108  |  18  ----------------------------<  64<L>  4.3   |  24  |  1.0    Ca    9.4      06 Jul 2020 06:33    TPro  6.1  /  Alb  3.7  /  TBili  0.2  /  DBili  x   /  AST  26  /  ALT  14  /  AlkPhos  72  07-06                  RADIOLOGY:        PHYSICAL EXAM:  GENERAL: frail elderly F in NAD, speaks in full sentences, no signs of respiratory distress  HEAD: Atraumatic  NECK: Supple  CHEST/LUNG: Clear to auscultation bilaterally; No wheeze or crackles  HEART: S1, S2; RRR; No murmurs, rubs, or gallops  ABDOMEN: BS+; Soft, Non-tender, Non-distended  EXTREMITIES:  2+ Peripheral Pulses, No clubbing, cyanosis, or edema  PSYCH: AAOx0-1  NEUROLOGY: non-focal  SKIN: No rashes or lesions      Assessment and Plan:   89 y/o F with PMH of HTN, hypothyroidism, severe Alzheimers dementia, DVT on eliquis, recent diagnosis of acute cystitis on day 3 of macrobid, A&O X0-1 at baseline but alert and interactive, presenting to the ED for AMS, decreased PO intake and decreased urine output. History was obtained from son, Ron Boo ([phone]) whom the patient lives with. Over the last week he has noticed the patient become more confused. Son says this has happened twice in the past where she was diagnosed with a UTI. She normally eats and drinks well but has not been drinking as much over the last few days and has had dark, very foul smelling urine. He brought patient to PCP who prescribed macrobid (currently day 3). Patients symptoms did not improve so he brought her here.    # AMS/decreased PO intake/decreased urine output - likely secondary to metabolic encephalopathy from unknown cause (possible UTI but no signs of infection so abx stopped)  - In the ED, /97, HR 69. Temp 97.1 100% on RA. WBC 8.25. UA negative.   - had Macrobid for 3 days as outpatient for acute cystitis  - CT head negative for acute intracranial pathology. CXR negative for acute pathology.   - REEG WNL / NO NEED FOR MRI   - Neurology consult --> no need for MRI. likely delirium due to dementia  - no sign of infection, d/c Rocephin   - Urine cultures and blood culture negative  - continue to monitor  - check B12, Ammonia --> normal    # SHARON (Cr 1.7 on admission, increased from baseline of approx 1.0) - likely pre-renal given decreased PO intake and dark, foul smelling urine reported by son  - improved   - took IVF. off fluids for now  - hold lisinopril   - tend BMP     # HTN (151/97 on admission)  - hold lisinopril for SHARON  - continue to monitor  - continue amlodipine    # Hypothyroidism   - continue synthroid  - f/u TSH --> normal    # Severe Alzheimers Dementia (A&O X0-1 at baseline)  - She ambulates with a cane at baseline and eats 3 meals a day, and wears diapers. Has home health aid come daily to help with changing and cleaning the patient.   - continue home meds (list in chart)  - Pharmacy does not have latuda (it is non-formulary). son to bring in home meds     # DVT (diagnosed on 3/7/20)  - [facility] Duplex Lower Ext Vein Scan: Acute right femoral and popliteal vein DVT. Right gastrocnemius vein thrombosis; Acute left popliteal vein DVT.  - continue eliquis (meets 2/3 criteria)    DVT ppx: eliquis   GI ppx:  Not indicated   Diet: dysphagia diet   Activity: Inc as tolerated. f/u PT/OT  Dispo: needs SNF  DNR/DNI. Goals of care discussion with son, Ron Boo () on 7/2.

## 2020-07-07 NOTE — PROGRESS NOTE ADULT - REASON FOR ADMISSION
AMS, decreased PO intake, decrease urine output

## 2020-07-07 NOTE — DISCHARGE NOTE PROVIDER - NSDCCPCAREPLAN_GEN_ALL_CORE_FT
PRINCIPAL DISCHARGE DIAGNOSIS  Diagnosis: Acute encephalopathy  Assessment and Plan of Treatment: you were found to have delerium on top of chronic dementia. please continue rehab work as outpatient and fu with your geriatric doctor. please continue taking your medications as prescribed      SECONDARY DISCHARGE DIAGNOSES  Diagnosis: SHARON (acute kidney injury)  Assessment and Plan of Treatment: Resolved now. You were noted to have a temporary insult to your kidney function either at the time that you arrived at the hospital or during your stay here. We have monitored your kidney function with blood work during your time here and you are at a level that no longer requires continued hospital level care, but we do recommend that you follow up to continually have your kidney function checked. You can follow up with your primary care doctor, or, if recommended in the discharge paperwork, you should follow up with a Kidney specialist called a Nephrologist. PRINCIPAL DISCHARGE DIAGNOSIS  Diagnosis: Acute encephalopathy  Assessment and Plan of Treatment: you were found to have delerium on top of chronic dementia. please continue rehab work as outpatient and fu with your geriatric doctor. please continue taking your medications as prescribed. Have your PMD reacess the need for Seroquel and Latuda.      SECONDARY DISCHARGE DIAGNOSES  Diagnosis: SHARON (acute kidney injury)  Assessment and Plan of Treatment: Resolved now. You were noted to have a temporary insult to your kidney function either at the time that you arrived at the hospital or during your stay here. We have monitored your kidney function with blood work during your time here and you are at a level that no longer requires continued hospital level care, but we do recommend that you follow up to continually have your kidney function checked. You can follow up with your primary care doctor.

## 2020-07-09 DIAGNOSIS — I10 ESSENTIAL (PRIMARY) HYPERTENSION: ICD-10-CM

## 2020-07-09 DIAGNOSIS — N17.9 ACUTE KIDNEY FAILURE, UNSPECIFIED: ICD-10-CM

## 2020-07-09 DIAGNOSIS — G92 TOXIC ENCEPHALOPATHY: ICD-10-CM

## 2020-07-09 DIAGNOSIS — I82.509 CHRONIC EMBOLISM AND THROMBOSIS OF UNSPECIFIED DEEP VEINS OF UNSPECIFIED LOWER EXTREMITY: ICD-10-CM

## 2020-07-09 DIAGNOSIS — G30.9 ALZHEIMER'S DISEASE, UNSPECIFIED: ICD-10-CM

## 2020-07-09 DIAGNOSIS — F02.80 DEMENTIA IN OTHER DISEASES CLASSIFIED ELSEWHERE, UNSPECIFIED SEVERITY, WITHOUT BEHAVIORAL DISTURBANCE, PSYCHOTIC DISTURBANCE, MOOD DISTURBANCE, AND ANXIETY: ICD-10-CM

## 2020-07-09 DIAGNOSIS — E86.0 DEHYDRATION: ICD-10-CM

## 2020-07-09 DIAGNOSIS — E03.9 HYPOTHYROIDISM, UNSPECIFIED: ICD-10-CM

## 2020-07-09 DIAGNOSIS — G93.41 METABOLIC ENCEPHALOPATHY: ICD-10-CM

## 2020-07-29 NOTE — ED PROVIDER NOTE - FAMILY DETAILS FREE TEXT FOR MDM ADDL HISTORY OBTAINED FROM QUESTION
After confirming stable and acceptable signal fidelity, hemostasis was achieved with manual compression and the incision was then closed with skin glue and steri-strips.   spoke to son over phone who reports frequent falls but no head injury

## 2020-09-18 PROBLEM — F03.90 UNSPECIFIED DEMENTIA WITHOUT BEHAVIORAL DISTURBANCE: Chronic | Status: ACTIVE | Noted: 2020-03-06

## 2020-09-18 PROBLEM — I10 ESSENTIAL (PRIMARY) HYPERTENSION: Chronic | Status: ACTIVE | Noted: 2020-07-02

## 2020-09-18 PROBLEM — E03.9 HYPOTHYROIDISM, UNSPECIFIED: Chronic | Status: ACTIVE | Noted: 2020-07-02

## 2020-09-18 PROBLEM — I82.409 ACUTE EMBOLISM AND THROMBOSIS OF UNSPECIFIED DEEP VEINS OF UNSPECIFIED LOWER EXTREMITY: Chronic | Status: ACTIVE | Noted: 2020-07-02

## 2020-09-21 ENCOUNTER — APPOINTMENT (OUTPATIENT)
Dept: GERIATRICS | Facility: CLINIC | Age: 85
End: 2020-09-21
Payer: MEDICARE

## 2020-09-21 DIAGNOSIS — Z74.09 OTHER REDUCED MOBILITY: ICD-10-CM

## 2020-09-21 DIAGNOSIS — I10 ESSENTIAL (PRIMARY) HYPERTENSION: ICD-10-CM

## 2020-09-21 DIAGNOSIS — I82.403 ACUTE EMBOLISM AND THROMBOSIS OF UNSPECIFIED DEEP VEINS OF LOWER EXTREMITY, BILATERAL: ICD-10-CM

## 2020-09-21 PROCEDURE — XXXXX: CPT

## 2020-09-21 RX ORDER — LEVOTHYROXINE SODIUM 50 UG/1
50 TABLET ORAL
Qty: 30 | Refills: 1 | Status: ACTIVE | COMMUNITY
Start: 2020-09-21 | End: 1900-01-01

## 2020-09-22 VITALS — DIASTOLIC BLOOD PRESSURE: 70 MMHG | TEMPERATURE: 98 F | SYSTOLIC BLOOD PRESSURE: 115 MMHG

## 2020-09-22 PROBLEM — Z74.09 IMPAIRED FUNCTIONAL MOBILITY, BALANCE, GAIT, AND ENDURANCE: Status: ACTIVE | Noted: 2020-09-22

## 2020-09-22 RX ORDER — NITROFURANTOIN MACROCRYSTALS 100 MG/1
100 CAPSULE ORAL
Qty: 10 | Refills: 0 | Status: COMPLETED | COMMUNITY
Start: 2019-12-27 | End: 2020-09-22

## 2020-09-22 RX ORDER — LEVOTHYROXINE SODIUM 0.07 MG/1
75 TABLET ORAL
Qty: 90 | Refills: 3 | Status: COMPLETED | COMMUNITY
Start: 2019-12-19 | End: 2020-09-22

## 2020-09-22 RX ORDER — SERTRALINE HYDROCHLORIDE 50 MG/1
50 TABLET, FILM COATED ORAL DAILY
Qty: 1 | Refills: 3 | Status: DISCONTINUED | COMMUNITY
Start: 2019-11-14 | End: 2020-09-22

## 2020-09-22 RX ORDER — LURASIDONE HYDROCHLORIDE 60 MG/1
60 TABLET, FILM COATED ORAL AT BEDTIME
Qty: 50 | Refills: 5 | Status: DISCONTINUED | COMMUNITY
Start: 2020-05-28 | End: 2020-09-22

## 2020-09-22 RX ORDER — ALBUTEROL SULFATE 108 UG/1
108 (90 BASE) AEROSOL, METERED RESPIRATORY (INHALATION) EVERY 4 HOURS
Qty: 1 | Refills: 0 | Status: DISCONTINUED | COMMUNITY
Start: 2019-12-11 | End: 2020-09-22

## 2020-09-22 RX ORDER — QUETIAPINE FUMARATE 50 MG/1
50 TABLET ORAL
Qty: 30 | Refills: 5 | Status: DISCONTINUED | COMMUNITY
Start: 2020-05-28 | End: 2020-09-22

## 2020-09-22 RX ORDER — LISINOPRIL 5 MG/1
5 TABLET ORAL DAILY
Qty: 30 | Refills: 5 | Status: DISCONTINUED | COMMUNITY
Start: 2020-05-28 | End: 2020-09-22

## 2020-09-22 RX ORDER — LURASIDONE HYDROCHLORIDE 20 MG/1
20 TABLET, FILM COATED ORAL
Qty: 30 | Refills: 5 | Status: DISCONTINUED | COMMUNITY
Start: 2020-05-28 | End: 2020-09-22

## 2020-09-22 NOTE — HISTORY OF PRESENT ILLNESS
[Severe] : Stage: Severe [Improved] : Status: Improved [Memory Lapses Or Loss] : stable memory impairment [Patient Observed To Be Agitated] : improved agitation [Hostility Toward Caregivers] : resolved aggression [Sleep Disturbances] : improved sleep disturbances [] : improved wandering [Fixed Beliefs Contradicted By Reality (Delusions)] : improved delusions [Difficulty Finding Desired Words] : denies difficulty finding desired words [FreeTextEntry1] : (7 yo female was in hospital in early march for edema of leg found to have B/L DVT; was sent to rehab in [facility], discharged 3 weeks ago. Since then doing well, has better appetite, antipsychotics were stopped, and patient takes melatonin for sleep. At times she c/o pain, possibly hip but due to dementia not able to provide details. She ambulates with quad cane, no falls. \par Medications review done, list updated. \par Patient had SHARON, lisinopril stopped and replaced with amlodipine 5 mg daily. \par \par \par \par \par  \par

## 2020-09-22 NOTE — REVIEW OF SYSTEMS
[Joint Pain] : joint pain [Confused] : confusion [Negative] : Respiratory [Red Eyes] : eyes not red [Discharge From Eyes] : no purulent discharge from the eyes [Chest Pain] : no chest pain [Vomiting] : no vomiting [Constipation] : no constipation [Joint Swelling] : no joint swelling [FreeTextEntry9] : hip?

## 2020-09-22 NOTE — SOCIAL HISTORY
[No falls in past year] : Patient reported no falls in the past year [Some assistance needed] : feeding [Canes] : jese [FreeTextEntry1] : son [FreeTextEntry4] : unable [de-identified] : quad cane

## 2020-09-22 NOTE — ASSESSMENT
[FreeTextEntry1] : B/l DVT extensive but limited mobility, still on Eliquis 5mg BID, no bleeds, but due to age and risk for falls, has increased risk for falls with associated intracranial bleed\par - will check venous duplex legs for resolution, son would like to stop Eliquis as he was advised in the hospital, SNF physician told him to continue; discussed extensively risk and benefit including death, will make decision after studies done\par \par Anemia; chronic, will check b12 and ferritin\par \par HTN- low BP for age,and fall risk, would stop amlodipine, son will monitor\par \par Dementia with psychosis- much improved now, off antipsychotics, likely paradoxical agitation due to antipsychotics complicated by delirium due to UTI, c/w Zoloft 50 mg daily\par would decrease Synthroid to 50 mcg, to avoid over-stimulation, TSH low\par \par follow up telemedicine visit in 1 month [Remove loose items] : remove loose rugs or any other loose items that could lead to tripping and falling [Adequate lighting] : provide adequate lighting and use a night light [Medication Review] : reviewed medications that increase the risk of falls [Use recommended devices] : use recommended assistive devices [Pain Management] : pain management [Medication Management] : medication management

## 2020-09-22 NOTE — PHYSICAL EXAM
[General Appearance - Alert] : alert [General Appearance - In No Acute Distress] : in no acute distress [General Appearance - Well-Appearing] : healthy appearing [Sclera] : the sclera and conjunctiva were normal [Neck Appearance] : the appearance of the neck was normal [] : no respiratory distress [Respiration, Rhythm And Depth] : normal respiratory rhythm and effort [Exaggerated Use Of Accessory Muscles For Inspiration] : no accessory muscle use

## 2020-09-22 NOTE — REASON FOR VISIT
[Home] : at home, [unfilled] , at the time of the visit. [Family Member] : family member [Medical Office: ([facility])___] : at the medical office located in  [FreeTextEntry4] : son

## 2020-10-08 NOTE — ED ADULT NURSE NOTE - CAS EDP DISCH DISPOSITION ADMI
Detail Level: Zone Plan: Glycolic 70/tca 20 4c/[facility] Regimen: [facility] compound at [facility] Plan: Chem peel today GLY 70/TCA 30\\nInitiated Accutane today

## 2020-10-12 ENCOUNTER — INPATIENT (INPATIENT)
Facility: HOSPITAL | Age: 85
LOS: 4 days | Discharge: HOME HEALTH | End: 2020-10-17
Attending: SURGERY | Admitting: SURGERY
Payer: MEDICARE

## 2020-10-12 VITALS
OXYGEN SATURATION: 98 % | HEIGHT: 61 IN | DIASTOLIC BLOOD PRESSURE: 64 MMHG | HEART RATE: 60 BPM | RESPIRATION RATE: 20 BRPM | SYSTOLIC BLOOD PRESSURE: 145 MMHG | WEIGHT: 100.09 LBS

## 2020-10-12 DIAGNOSIS — Z96.643 PRESENCE OF ARTIFICIAL HIP JOINT, BILATERAL: Chronic | ICD-10-CM

## 2020-10-12 LAB
ALBUMIN SERPL ELPH-MCNC: 3.8 G/DL — SIGNIFICANT CHANGE UP (ref 3.5–5.2)
ALP SERPL-CCNC: 74 U/L — SIGNIFICANT CHANGE UP (ref 30–115)
ALT FLD-CCNC: 12 U/L — SIGNIFICANT CHANGE UP (ref 0–41)
ANION GAP SERPL CALC-SCNC: 10 MMOL/L — SIGNIFICANT CHANGE UP (ref 7–14)
ANION GAP SERPL CALC-SCNC: 14 MMOL/L — SIGNIFICANT CHANGE UP (ref 7–14)
APTT BLD: 31.7 SEC — SIGNIFICANT CHANGE UP (ref 27–39.2)
AST SERPL-CCNC: 34 U/L — SIGNIFICANT CHANGE UP (ref 0–41)
BASOPHILS # BLD AUTO: 0.05 K/UL — SIGNIFICANT CHANGE UP (ref 0–0.2)
BASOPHILS NFR BLD AUTO: 0.7 % — SIGNIFICANT CHANGE UP (ref 0–1)
BILIRUB SERPL-MCNC: <0.2 MG/DL — SIGNIFICANT CHANGE UP (ref 0.2–1.2)
BUN SERPL-MCNC: 21 MG/DL — HIGH (ref 10–20)
BUN SERPL-MCNC: 29 MG/DL — HIGH (ref 10–20)
CALCIUM SERPL-MCNC: 9.1 MG/DL — SIGNIFICANT CHANGE UP (ref 8.5–10.1)
CALCIUM SERPL-MCNC: 9.8 MG/DL — SIGNIFICANT CHANGE UP (ref 8.5–10.1)
CHLORIDE SERPL-SCNC: 104 MMOL/L — SIGNIFICANT CHANGE UP (ref 98–110)
CHLORIDE SERPL-SCNC: 105 MMOL/L — SIGNIFICANT CHANGE UP (ref 98–110)
CK MB CFR SERPL CALC: 6.1 NG/ML — SIGNIFICANT CHANGE UP (ref 0.6–6.3)
CK SERPL-CCNC: 147 U/L — SIGNIFICANT CHANGE UP (ref 0–225)
CO2 SERPL-SCNC: 24 MMOL/L — SIGNIFICANT CHANGE UP (ref 17–32)
CO2 SERPL-SCNC: 26 MMOL/L — SIGNIFICANT CHANGE UP (ref 17–32)
CREAT SERPL-MCNC: 1 MG/DL — SIGNIFICANT CHANGE UP (ref 0.7–1.5)
CREAT SERPL-MCNC: 1.1 MG/DL — SIGNIFICANT CHANGE UP (ref 0.7–1.5)
EOSINOPHIL # BLD AUTO: 0.29 K/UL — SIGNIFICANT CHANGE UP (ref 0–0.7)
EOSINOPHIL NFR BLD AUTO: 4 % — SIGNIFICANT CHANGE UP (ref 0–8)
ETHANOL SERPL-MCNC: <10 MG/DL — SIGNIFICANT CHANGE UP
GAS PNL BLDV: SIGNIFICANT CHANGE UP
GLUCOSE BLDC GLUCOMTR-MCNC: 98 MG/DL — SIGNIFICANT CHANGE UP (ref 70–99)
GLUCOSE SERPL-MCNC: 102 MG/DL — HIGH (ref 70–99)
GLUCOSE SERPL-MCNC: 109 MG/DL — HIGH (ref 70–99)
HCT VFR BLD CALC: 34.8 % — LOW (ref 37–47)
HGB BLD-MCNC: 11 G/DL — LOW (ref 12–16)
IMM GRANULOCYTES NFR BLD AUTO: 0.7 % — HIGH (ref 0.1–0.3)
INR BLD: 1.31 RATIO — HIGH (ref 0.65–1.3)
LACTATE SERPL-SCNC: 1.2 MMOL/L — SIGNIFICANT CHANGE UP (ref 0.7–2)
LIDOCAIN IGE QN: 141 U/L — HIGH (ref 7–60)
LYMPHOCYTES # BLD AUTO: 1.45 K/UL — SIGNIFICANT CHANGE UP (ref 1.2–3.4)
LYMPHOCYTES # BLD AUTO: 20.2 % — LOW (ref 20.5–51.1)
MCHC RBC-ENTMCNC: 30.4 PG — SIGNIFICANT CHANGE UP (ref 27–31)
MCHC RBC-ENTMCNC: 31.6 G/DL — LOW (ref 32–37)
MCV RBC AUTO: 96.1 FL — SIGNIFICANT CHANGE UP (ref 81–99)
MONOCYTES # BLD AUTO: 0.58 K/UL — SIGNIFICANT CHANGE UP (ref 0.1–0.6)
MONOCYTES NFR BLD AUTO: 8.1 % — SIGNIFICANT CHANGE UP (ref 1.7–9.3)
NEUTROPHILS # BLD AUTO: 4.76 K/UL — SIGNIFICANT CHANGE UP (ref 1.4–6.5)
NEUTROPHILS NFR BLD AUTO: 66.3 % — SIGNIFICANT CHANGE UP (ref 42.2–75.2)
NRBC # BLD: 0 /100 WBCS — SIGNIFICANT CHANGE UP (ref 0–0)
PLATELET # BLD AUTO: 271 K/UL — SIGNIFICANT CHANGE UP (ref 130–400)
POTASSIUM SERPL-MCNC: 4.3 MMOL/L — SIGNIFICANT CHANGE UP (ref 3.5–5)
POTASSIUM SERPL-MCNC: 5.9 MMOL/L — HIGH (ref 3.5–5)
POTASSIUM SERPL-SCNC: 4.3 MMOL/L — SIGNIFICANT CHANGE UP (ref 3.5–5)
POTASSIUM SERPL-SCNC: 5.9 MMOL/L — HIGH (ref 3.5–5)
PROT SERPL-MCNC: 6.4 G/DL — SIGNIFICANT CHANGE UP (ref 6–8)
PROTHROM AB SERPL-ACNC: 15.1 SEC — HIGH (ref 9.95–12.87)
RBC # BLD: 3.62 M/UL — LOW (ref 4.2–5.4)
RBC # FLD: 11.7 % — SIGNIFICANT CHANGE UP (ref 11.5–14.5)
SODIUM SERPL-SCNC: 141 MMOL/L — SIGNIFICANT CHANGE UP (ref 135–146)
SODIUM SERPL-SCNC: 142 MMOL/L — SIGNIFICANT CHANGE UP (ref 135–146)
TROPONIN T SERPL-MCNC: <0.01 NG/ML — SIGNIFICANT CHANGE UP
WBC # BLD: 7.18 K/UL — SIGNIFICANT CHANGE UP (ref 4.8–10.8)
WBC # FLD AUTO: 7.18 K/UL — SIGNIFICANT CHANGE UP (ref 4.8–10.8)

## 2020-10-12 PROCEDURE — 12001 RPR S/N/AX/GEN/TRNK 2.5CM/<: CPT

## 2020-10-12 PROCEDURE — 71260 CT THORAX DX C+: CPT | Mod: 26

## 2020-10-12 PROCEDURE — 99283 EMERGENCY DEPT VISIT LOW MDM: CPT

## 2020-10-12 PROCEDURE — 70450 CT HEAD/BRAIN W/O DYE: CPT | Mod: 26,77

## 2020-10-12 PROCEDURE — 72170 X-RAY EXAM OF PELVIS: CPT | Mod: 26

## 2020-10-12 PROCEDURE — 70450 CT HEAD/BRAIN W/O DYE: CPT | Mod: 26

## 2020-10-12 PROCEDURE — 93010 ELECTROCARDIOGRAM REPORT: CPT

## 2020-10-12 PROCEDURE — 71045 X-RAY EXAM CHEST 1 VIEW: CPT | Mod: 26

## 2020-10-12 PROCEDURE — 72125 CT NECK SPINE W/O DYE: CPT | Mod: 26

## 2020-10-12 PROCEDURE — 99291 CRITICAL CARE FIRST HOUR: CPT

## 2020-10-12 PROCEDURE — 99291 CRITICAL CARE FIRST HOUR: CPT | Mod: 25

## 2020-10-12 PROCEDURE — 74177 CT ABD & PELVIS W/CONTRAST: CPT | Mod: 26

## 2020-10-12 RX ORDER — PROTHROMBIN COMPLEX CONCENTRATE (HUMAN) 25.5; 16.5; 24; 22; 22; 26 [IU]/ML; [IU]/ML; [IU]/ML; [IU]/ML; [IU]/ML; [IU]/ML
2000 POWDER, FOR SOLUTION INTRAVENOUS ONCE
Refills: 0 | Status: COMPLETED | OUTPATIENT
Start: 2020-10-12 | End: 2020-10-12

## 2020-10-12 RX ORDER — SODIUM CHLORIDE 9 MG/ML
1000 INJECTION, SOLUTION INTRAVENOUS
Refills: 0 | Status: DISCONTINUED | OUTPATIENT
Start: 2020-10-12 | End: 2020-10-12

## 2020-10-12 RX ORDER — LABETALOL HCL 100 MG
10 TABLET ORAL ONCE
Refills: 0 | Status: COMPLETED | OUTPATIENT
Start: 2020-10-12 | End: 2020-10-12

## 2020-10-12 RX ORDER — LEVOTHYROXINE SODIUM 125 MCG
75 TABLET ORAL DAILY
Refills: 0 | Status: DISCONTINUED | OUTPATIENT
Start: 2020-10-12 | End: 2020-10-17

## 2020-10-12 RX ORDER — SENNA PLUS 8.6 MG/1
2 TABLET ORAL AT BEDTIME
Refills: 0 | Status: DISCONTINUED | OUTPATIENT
Start: 2020-10-12 | End: 2020-10-17

## 2020-10-12 RX ORDER — LEVETIRACETAM 250 MG/1
1000 TABLET, FILM COATED ORAL ONCE
Refills: 0 | Status: DISCONTINUED | OUTPATIENT
Start: 2020-10-12 | End: 2020-10-12

## 2020-10-12 RX ORDER — PANTOPRAZOLE SODIUM 20 MG/1
40 TABLET, DELAYED RELEASE ORAL
Refills: 0 | Status: DISCONTINUED | OUTPATIENT
Start: 2020-10-12 | End: 2020-10-13

## 2020-10-12 RX ORDER — AMLODIPINE BESYLATE 2.5 MG/1
5 TABLET ORAL DAILY
Refills: 0 | Status: DISCONTINUED | OUTPATIENT
Start: 2020-10-12 | End: 2020-10-17

## 2020-10-12 RX ORDER — LEVETIRACETAM 250 MG/1
250 TABLET, FILM COATED ORAL EVERY 12 HOURS
Refills: 0 | Status: DISCONTINUED | OUTPATIENT
Start: 2020-10-12 | End: 2020-10-17

## 2020-10-12 RX ORDER — AMLODIPINE BESYLATE 2.5 MG/1
5 TABLET ORAL ONCE
Refills: 0 | Status: COMPLETED | OUTPATIENT
Start: 2020-10-12 | End: 2020-10-12

## 2020-10-12 RX ORDER — SODIUM CHLORIDE 9 MG/ML
1000 INJECTION INTRAMUSCULAR; INTRAVENOUS; SUBCUTANEOUS
Refills: 0 | Status: DISCONTINUED | OUTPATIENT
Start: 2020-10-12 | End: 2020-10-13

## 2020-10-12 RX ORDER — SERTRALINE 25 MG/1
25 TABLET, FILM COATED ORAL DAILY
Refills: 0 | Status: DISCONTINUED | OUTPATIENT
Start: 2020-10-12 | End: 2020-10-17

## 2020-10-12 RX ADMIN — SENNA PLUS 2 TABLET(S): 8.6 TABLET ORAL at 23:27

## 2020-10-12 RX ADMIN — Medication 1 TABLET(S): at 14:18

## 2020-10-12 RX ADMIN — Medication 10 MILLIGRAM(S): at 14:29

## 2020-10-12 RX ADMIN — SODIUM CHLORIDE 75 MILLILITER(S): 9 INJECTION, SOLUTION INTRAVENOUS at 13:01

## 2020-10-12 RX ADMIN — PROTHROMBIN COMPLEX CONCENTRATE (HUMAN) 400 INTERNATIONAL UNIT(S): 25.5; 16.5; 24; 22; 22; 26 POWDER, FOR SOLUTION INTRAVENOUS at 13:01

## 2020-10-12 RX ADMIN — SERTRALINE 25 MILLIGRAM(S): 25 TABLET, FILM COATED ORAL at 14:18

## 2020-10-12 RX ADMIN — AMLODIPINE BESYLATE 5 MILLIGRAM(S): 2.5 TABLET ORAL at 14:13

## 2020-10-12 RX ADMIN — LEVETIRACETAM 400 MILLIGRAM(S): 250 TABLET, FILM COATED ORAL at 18:54

## 2020-10-12 NOTE — CONSULT NOTE ADULT - SUBJECTIVE AND OBJECTIVE BOX
HPI:  89yF w/ PMHx of Alzheimer's dementia, HTN, hypothyroidism, DVT (on Eliquis) seen as a trauma alert s/p fall from bed onto floor, +HT, -LOC, +AC (on Eliquis).  Per EMS, patient frequently falls and she was attempting to get out of bed when she fell this time. Patient is very demented at baseline and no further history was able to be obtained.  (12 Oct 2020 11:49)      PAST MEDICAL & SURGICAL HISTORY:  DVT, lower extremity  on eliquis    Hypothyroidism    Hypertension    Dementia  severe, Alzheimers    H/O bilateral hip replacements        Hospital Course:    TODAY'S SUBJECTIVE & REVIEW OF SYMPTOMS:     Constitutional WNL   Cardio WNL   Resp WNL   GI WNL  Heme WNL  Endo WNL  Skin WNL  MSK Weakness  Neuro WNL  Cognitive confused  Psych WNL      MEDICATIONS  (STANDING):  amLODIPine   Tablet 5 milliGRAM(s) Oral daily  lactated ringers. 1000 milliLiter(s) (75 mL/Hr) IV Continuous <Continuous>  levothyroxine 75 MICROGram(s) Oral daily  multivitamin 1 Tablet(s) Oral daily  pantoprazole    Tablet 40 milliGRAM(s) Oral before breakfast  senna 2 Tablet(s) Oral at bedtime  sertraline 25 milliGRAM(s) Oral daily    MEDICATIONS  (PRN):  bisacodyl 5 milliGRAM(s) Oral every 12 hours PRN Constipation      FAMILY HISTORY:  No pertinent family history in first degree relatives        Allergies    sulfonamides (Other)    Intolerances        SOCIAL HISTORY:    [  ] Etoh  [  ] Smoking  [  ] Substance abuse     Home Environment:  [  ] Home Alone  [x  ] Lives with Family  [  ] Home Health Aid    Dwelling:  [  ] Apartment  [x  ] Private House  [  ] Adult Home  [  ] Skilled Nursing Facility      [  ] Short Term  [  ] Long Term  [  ] Stairs       Elevator [  ]    FUNCTIONAL STATUS PTA: (Check all that apply)  Ambulation: [   ]Independent    [ x ] Dependent     [  ] Non-Ambulatory  Assistive Device: [  ] SA Cane  [  ]  Q Cane  [  ] Walker  [  ]  Wheelchair  ADL : [  ] Independent  [x  ]  Dependent       Vital Signs Last 24 Hrs  T(C): 35.7 (12 Oct 2020 13:45), Max: 35.7 (12 Oct 2020 13:45)  T(F): 96.3 (12 Oct 2020 13:45), Max: 96.3 (12 Oct 2020 13:45)  HR: 61 (12 Oct 2020 13:45) (60 - 67)  BP: 173/70 (12 Oct 2020 13:45) (145/64 - 173/70)  BP(mean): 101 (12 Oct 2020 13:45) (101 - 101)  RR: 17 (12 Oct 2020 07:15) (17 - 20)  SpO2: 98% (12 Oct 2020 13:45) (97% - 98%)      PHYSICAL EXAM: Awake & confused  GENERAL: NAD  HEAD:  Normocephalic  CHEST/LUNG: Clear   HEART: S1S2+  ABDOMEN: Soft, Nontender  EXTREMITIES:  no calf tenderness    NERVOUS SYSTEM:  Cranial Nerves 2-12 intact [  ] Abnormal  [  ]  ROM: WFL all extremities [  ]  Abnormal [  ]able to move all ext - poor cooperation  Motor Strength: WFL all extremities  [  ]  Abnormal [  ]  Sensation: intact to light touch [  ] Abnormal [  ]    FUNCTIONAL STATUS:  Bed Mobility: Independent [  ]  Supervision [  ]  Needs Assistance [ x ]  N/A [  ]  Transfers: Independent [  ]  Supervision [  ]  Needs Assistance [x  ]  N/A [  ]   Ambulation: Independent [  ]  Supervision [  ]  Needs Assistance [  ]  N/A [  ]  ADL: Independent [  ] Requires Assistance [  ] N/A [  ]      LABS:                        11.0   7.18  )-----------( 271      ( 12 Oct 2020 04:34 )             34.8     10-12    141  |  105  |  29<H>  ----------------------------<  102<H>  5.9<H>   |  26  |  1.1    Ca    9.1      12 Oct 2020 04:34    TPro  6.4  /  Alb  3.8  /  TBili  <0.2  /  DBili  x   /  AST  34  /  ALT  12  /  AlkPhos  74  10-12    PT/INR - ( 12 Oct 2020 04:34 )   PT: 15.10 sec;   INR: 1.31 ratio         PTT - ( 12 Oct 2020 04:34 )  PTT:31.7 sec      RADIOLOGY & ADDITIONAL STUDIES:

## 2020-10-12 NOTE — ED PROVIDER NOTE - CARE PLAN
Principal Discharge DX:	Subdural hematoma   Principal Discharge DX:	Subdural hematoma  Secondary Diagnosis:	Laceration of scalp, initial encounter  Secondary Diagnosis:	Fall in home, initial encounter

## 2020-10-12 NOTE — ED ADULT NURSE NOTE - OBJECTIVE STATEMENT
patient s/p unwitnessed fall out of bed. + AC use, + HT, sustained lac to back of head. denies pain at this time. denies cp/sob/n/v/d.

## 2020-10-12 NOTE — ED PROVIDER NOTE - PROGRESS NOTE DETAILS
Jamie: pt stable, not in any distress. CT reads and UA pending.  Pt signed out to Dr. Call. Authored by Dr Call: the patient was endorsed to me to follow up CT scan and d/c home if negative.  Patient appears well, no complaints, laceration was repaired  in ED, CT done, official reading pending.  Will d/c home if reads are negative. Pt nontender at T8, no step off. likely chronic fracture. CT head shows a small SDH, pt seen by neurosurgery service, reversal of Eliquis advised, will order kCentra, . Patient accepted to SICU as d/w Dr Maria.

## 2020-10-12 NOTE — H&P ADULT - ASSESSMENT
This is a 89yF w/ PMHx of Alzheimer's dementia, HTN, hypothyroidism, DVT (on Eliquis) seen as a trauma alert s/p fall from bed onto floor, +HT, -LOC, +AC (on Eliquis). External signs of trauma include 2cm occipital scalp laceration, not actively bleeding. Trauma assessment in ED: ABCs intact , GCS 13 , AAOx1 (at baseline),  MAY spontaneously. CT head shows acute SDH for which neurosurgery is following     PLAN:   - Neurosurgery to reverse Eliquis, kcentra ordered   - Q1 neuro checks   - Admit to trauma   - ICU consult   - Home meds   - NPO  - IVF  - Trauma labs: CBC, BMP, Coags, T&S, ETOH level, UA

## 2020-10-12 NOTE — ED PROVIDER NOTE - CRITICAL CARE PROVIDED
additional history taking/telephone consultation with the patient's family/documentation/consult w/ pt's family directly relating to pts condition/direct patient care (not related to procedure)/interpretation of diagnostic studies/consultation with other physicians

## 2020-10-12 NOTE — ED PROVIDER NOTE - PHYSICAL EXAMINATION
CONSTITUTIONAL: well-appearing, in NAD, GCS 15  SKIN: Warm dry, normal skin turgor, 2cm lac to posterior scalp  HEAD: NC  EYES: EOMI, PERRLA, no scleral icterus, conjunctiva pink  ENT: no epistaxis or blood in the oropharynx  NECK: Supple; non tender. Full ROM. trachea midline.  CARD: RRR, no murmurs.  THORAX: no chest wall tenderness.  RESP: b/l breath sounds CTABL  ABD: soft, non-tender, non-distended, no rebound or guarding.  EXT: Full ROM, no bony tenderness, no pedal edema, no calf tenderness, stable pelvis, no spinal back tenderness. peripheral pulses intact and symmetrical.  NEURO: moving all extremities equally.   PSYCH: Cooperative, appropriate. A&O x1.

## 2020-10-12 NOTE — ED ADULT NURSE NOTE - CHIEF COMPLAINT QUOTE
BIBA with complaints of S/P fall while getting out from bed, + head laceration, unknown LOC, + Eliquis

## 2020-10-12 NOTE — H&P ADULT - HISTORY OF PRESENT ILLNESS
89yF w/ PMHx of Alzheimer's dementia, HTN, hypothyroidism, DVT (on Eliquis) seen as a trauma alert s/p fall from bed onto floor, +HT, -LOC, +AC (on Eliquis).  Per EMS, patient frequently falls and she was attempting to get out of bed when she fell this time. Patient is very demented at baseline and no further history was able to be obtained.

## 2020-10-12 NOTE — ED ADULT NURSE NOTE - NSIMPLEMENTINTERV_GEN_ALL_ED
Implemented All Fall with Harm Risk Interventions:  Hope to call system. Call bell, personal items and telephone within reach. Instruct patient to call for assistance. Room bathroom lighting operational. Non-slip footwear when patient is off stretcher. Physically safe environment: no spills, clutter or unnecessary equipment. Stretcher in lowest position, wheels locked, appropriate side rails in place. Provide visual cue, wrist band, yellow gown, etc. Monitor gait and stability. Monitor for mental status changes and reorient to person, place, and time. Review medications for side effects contributing to fall risk. Reinforce activity limits and safety measures with patient and family. Provide visual clues: red socks.

## 2020-10-12 NOTE — CONSULT NOTE ADULT - ASSESSMENT
Assessment & Plan    89y Female  s/p     NEURO:    RESP:       CARDS:     GI/NUTR:     /RENAL:     HEME/ONC:     ID:    ENDO:    LINES/DRAINS:    DISPO: Assessment & Plan    89yF w/PMHx of Alzheimer's dementia (A&Ox1), HTN, hypothyroidism, DVT in 2020 (on Eliquis) seen as a trauma alert s/p fall from bed onto floor, +HT, -LOC, +AC (on Eliquis).  Per EMS, patient frequently falls and she was attempting to get out of bed when she fell this time. Patient is very demented at baseline and no further history was able to be obtained. In the ED she is afebrile, /77, HR 67. She is saturating 97%. On exam she is A&Ox1 (baseline). She does not easily follow commands, has +ROM in upper and lower extremities. She has no leukocytosis on labs, her Hgb is 11.0. Potassium significant for 5.9, hemolyzed. Pending repeat. Imaging demonstrated the followin.  Small focal acute subdural hematoma noted along the left anterior falx measuring approximately 4 mm in thickness    NEURO:    RESP:       CARDS:     GI/NUTR:     /RENAL:     HEME/ONC:     ID:    ENDO:    LINES/DRAINS:    DISPO: Assessment & Plan    89yF w/PMHx of Alzheimer's dementia (A&Ox1), HTN, hypothyroidism, DVT in 2020 (on Eliquis) seen as a trauma alert s/p fall from bed onto floor, +HT, -LOC, +AC (on Eliquis).  Per EMS, patient frequently falls and she was attempting to get out of bed when she fell this time. Patient is very demented at baseline and no further history was able to be obtained. In the ED she is afebrile, /77, HR 67. She is saturating 97%. On exam she is A&Ox1 (baseline). She does not easily follow commands, has +ROM in upper and lower extremities. She has no leukocytosis on labs, her Hgb is 11.0. Potassium significant for 5.9, hemolyzed. Pending repeat. Imaging demonstrated the followin.  Small focal acute subdural hematoma noted along the left anterior falx measuring approximately 4 mm in thickness    NEURO:  A&Ox1   No focal neurologic deficits noted   CTH: small acute SDH along the left anterior falx (4mm)   Eliquis reversed with Kcentra   Keppra 250mg Q12 hours for 7 days   Q1 hour neurologic checks     RESP:  Saturating (97% - 98%) on RA   CXR: No radiographic evidence of acute cardiopulmonary disease  No known chronic diagnoses   AM CXR     CARDS:  HR: 67 (60 - 67)  BP: 166/77 (145/64 - 166/77)  EKG: Pending   Echo (3/10/20): EF of 51%, moderate mitral valve prolapse, moderate mitral valve regurgitation.  Trend cardiac enzymes   Hx of HTN  -Continue home amlodipine 5mg QD     GI/NUTR:  NPO except medications   LR @75   GI ppx: PTX   Bowel regimen: senna     /RENAL:     HEME/ONC:    ID:  Afebrile   WBC: 7.18   No evidence of infection  No antibiotics   Continue to monitor for fever   Daily CBC     ENDO:  FS: 93   No known history of DM   FS Q6 hrs     LINES/DRAINS:    DISPO: Assessment & Plan    89yF w/PMHx of Alzheimer's dementia (A&Ox1), HTN, hypothyroidism, DVT in 2020 (on Eliquis) seen as a trauma alert s/p fall from bed onto floor, +HT, -LOC, +AC (on Eliquis).  Per EMS, patient frequently falls and she was attempting to get out of bed when she fell this time. Patient is very demented at baseline and no further history was able to be obtained. In the ED she is afebrile, /77, HR 67. She is saturating 97%. On exam she is A&Ox1 (baseline). She does not easily follow commands, has +ROM in upper and lower extremities. She has no leukocytosis on labs, her Hgb is 11.0. Potassium significant for 5.9, hemolyzed. Pending repeat. Imaging demonstrated the followin.  Small focal acute subdural hematoma noted along the left anterior falx measuring approximately 4 mm in thickness    NEURO:  A&Ox1   No focal neurologic deficits noted   CTH: small acute SDH along the left anterior falx (4mm)   Eliquis reversed with Kcentra   Keppra 250mg Q12 hours for 7 days   Q1 hour neurologic checks     RESP:  Saturating (97% - 98%) on RA   CXR: No radiographic evidence of acute cardiopulmonary disease  No known chronic diagnoses   AM CXR     CARDS:  HR: 67 (60 - 67)  BP: 166/77 (145/64 - 166/77)  EKG: Pending   Echo (3/10/20): EF of 51%, moderate mitral valve prolapse, moderate mitral valve regurgitation.  Trend cardiac enzymes   Hx of HTN  -Continue home amlodipine 5mg QD     GI/NUTR:  NPO except medications   LR @75   GI ppx: PTX   Bowel regimen: senna     /RENAL:  Voiding     141  |  105  |  29<H>  ----------------------------<  102<H>  5.9<H>   |  26  |  1.1    Potassium 3.9 on VBG   F/U Repeat BMP at 16:00   Replete lytes PRN   Strict I&Os     HEME/ONC:  Hgb 11.0   No anticoagulation due to SDH, holding home Eliquis   Daily CBC     ID:  Afebrile   WBC: 7.18   No evidence of infection  No antibiotics   Continue to monitor for fever   Daily CBC     ENDO:  FS: 93   No known history of DM   FS Q6 hrs     LINES/DRAINS: PIV    DISPO: SICU

## 2020-10-12 NOTE — PHYSICAL THERAPY INITIAL EVALUATION ADULT - GENERAL OBSERVATIONS, REHAB EVAL
PT was approached for PT initial evaluation. As per PA, hold PT for today. PT will follow up tomorrow. PT was approached for PT initial evaluation. As per PA, hold PT for today. PT currently without OOB orders. PT will follow up tomorrow.

## 2020-10-12 NOTE — ED PROVIDER NOTE - CLINICAL SUMMARY MEDICAL DECISION MAKING FREE TEXT BOX
89 yo female with frequent fall, found to have a small subdural hematoma. in addition to scalp laceration.  kCentra was recommended for reversal of Eliquis, patient was evaluated by neurosurgery and trauma teams, admit to SICU for observation and further testing.

## 2020-10-12 NOTE — CONSULT NOTE ADULT - SUBJECTIVE AND OBJECTIVE BOX
SICU Consultation Note  ======================================================================================================  MARLENE BOO  MRN-[id number]    89y Female        [facility]  PAST MEDICAL & SURGICAL HISTORY:  DVT, lower extremity, on Eliquis  Hypothyroidism  Hypertension  Dementia  severe, Alzheimer's dementia   H/O bilateral hip replacements    Home Meds:  Home Medications:  amLODIPine 5 mg oral tablet: 1 tab(s) orally once a day (07 Jul 2020 10:53)  bisacodyl 5 mg oral delayed release tablet: 1 tab(s) orally every 12 hours, As needed, Constipation (07 Jul 2020 10:53)  melatonin 5 mg oral tablet: 1 tab(s) orally once a day (at bedtime) (07 Jul 2020 10:53)  senna oral tablet: 2 tab(s) orally once a day (at bedtime) (07 Jul 2020 10:53)  sertraline 25 mg oral tablet: 1 tab(s) orally once a day (02 Jul 2020 16:32)    Allergies  Allergies  sulfonamides (Other)    Current Medications:  amLODIPine   Tablet 5 milliGRAM(s) Oral daily  bisacodyl 5 milliGRAM(s) Oral every 12 hours PRN Constipation  lactated ringers. 1000 milliLiter(s) (75 mL/Hr) IV Continuous <Continuous>  levothyroxine 75 MICROGram(s) Oral daily  multivitamin 1 Tablet(s) Oral daily  senna 2 Tablet(s) Oral at bedtime  sertraline 25 milliGRAM(s) Oral daily    Advanced Directives: Presumed Full Code    VITAL SIGNS, INS/OUTS (Last 24hours):    ICU Vital Signs Last 24 Hrs  T(C): 35.6 (12 Oct 2020 07:15), Max: 35.6 (12 Oct 2020 07:15)  T(F): 96.1 (12 Oct 2020 07:15), Max: 96.1 (12 Oct 2020 07:15)  HR: 67 (12 Oct 2020 07:15) (60 - 67)  BP: 166/77 (12 Oct 2020 07:15) (145/64 - 166/77)  RR: 17 (12 Oct 2020 07:15) (17 - 20)  SpO2: 97% (12 Oct 2020 07:15) (97% - 98%)    I&O's Summary    Height (cm): 154.9 (10-12-20)  Weight (kg): 45.4 (10-12-20)  BMI (kg/m2): 18.9 (10-12-20)  BSA (m2): 1.41 (10-12-20)    Physical Exam:   ---------------------------------------------------------------------------------------  GCS:      Exam: A&Ox3, no focal deficits    RESPIRATORY:  Normal expansion/effort  Mechanical Ventilation:     CARDIOVASCULAR:   S1/S2.  RRR  No peripheral edema    GASTROINTESTINAL:  Abdomen soft, non-tender, non-distended    MUSCULOSKELETAL:  Extremities warm, pink, well-perfused.    DERM:  No skin breakdown     :   Exam: Yousif catheter in place.     Tubes/Lines/Drains   ----------------------------------------------------------------------------------------------------------  [x] Peripheral IV    LABS  --------------------------------------------------------------------------------------  Labs:    POCT Blood Glucose.: 93 mg/dL (12 Oct 2020 04:27)                      11.0   7.18  )-----------( 271      ( 12 Oct 2020 04:34 )             34.8       Auto Neutrophil %: 66.3 % (10-12-20 @ 04:34)  Auto Immature Granulocyte %: 0.7 % (10-12-20 @ 04:34)    10-12    141  |  105  |  29<H>  ----------------------------<  102<H>  5.9<H>   |  26  |  1.1    Calcium, Total Serum: 9.1 mg/dL (10-12-20 @ 04:34)    LFTs:             6.4  | <0.2 | 34       ------------------[74      ( 12 Oct 2020 04:34 )  3.8  | x    | 12          Lipase:141        Blood Gas Venous - Lactate: 0.7 mmoL/L (10-12-20 @ 06:18)  Lactate, Blood: 1.2 mmol/L (10-12-20 @ 04:34)    Coags:     15.10  ----< 1.31    ( 12 Oct 2020 04:34 )     31.7      Alcohol, Blood: <10 mg/dL (10-12-20 @ 04:34)    RADIOLOGY & ADDITIONAL STUDIES  ----------------------------------------------------------------------------------------------  < from: Xray Pelvis AP only (10.12.20 @ 05:26) >  Findings/  impression:  Diffuse osteopenia.No acute displaced fracture or dislocation.  Healed fracture deformity of the bilateral intertrochanteric femurs, post right hip gamma nail fixation and left hip intramedullary nail with 2 proximal femoral interlocking screw fixation.  Moderate bilateral hip osteoarthritis.  Chronic left inferior pubic ramus fracture deformity.  There are degenerative changes of the sacroiliac joints and lower lumbar spine.    < from: CT Head No Cont (10.12.20 @ 06:46) >  IMPRESSION:  CT HEAD:  1.  Small focal acute subdural hematoma noted along the left anterior falx measuring approximately 4 mm in thickness.  2.  No evidence of mass effect or midline shift.  3. Stable chronic microvascular ischemic changes and parenchymal volume loss.  CT CERVICAL SPINE:  No acute fracture or subluxation.  Polypoid soft tissue lesion at the right tongue base has slightly decreased in size since the prior examination of10/29/2018.    < from: CT Chest w/ IV Cont (10.12.20 @ 06:52) >  IMPRESSION:No acute intrathoracic or intra-abdominal/pelvic traumatic changes.  Stable chronic T12 vertebral body mild compression fracture deformity.  T9 moderate vertebral body compression fracture of indeterminate age, new since October 2018.    < from: CT Abdomen and Pelvis w/ IV Cont (10.12.20 @ 06:53) >  IMPRESSION:No acute intrathoracic or intra-abdominal/pelvic traumatic changes.  Stable chronic T12 vertebral body mild compression fracture deformity.  T9 moderate vertebral body compression fracture of indeterminate age, new since October 2018.    < from: Transthoracic Echocardiogram (03.10.20 @ 10:25) >  Summary:   1. Normal global left ventricular systolic function.   2. LV Ejection Fraction by Deng's Method with a biplane EF of 51 %.   3. Normal left ventricular internal cavity size.   4. Spectral Doppler shows impaired relaxation patternof left ventricular myocardial filling (Grade I diastolic dysfunction).   5. Mildly enlarged right atrium.   6. Mild mitral annular calcification.   7. Moderate Mitral valve prolapse.   8. Moderate mitral valve regurgitation.   9. Moderate thickeningof the anterior and posterior mitral valve leaflets.  10. Sclerotic aortic valve with normal opening.  11. Estimated pulmonary artery systolic pressure is 42.3 mmHg assuming a right atrial pressure of 10 mmHg, which is consistent with mild pulmonary hypertension.  12. LA volume Index is 37.0 ml/m² ml/m2.  --------------------------------------------------------------------------------------  Admit Diagnosis: SUBDURAL HEMATOMA          SICU Consultation Note  ======================================================================================================  MARLENE BOO  MRN-[id number]    89yF w/PMHx of Alzheimer's dementia (A&Ox1), HTN, hypothyroidism, DVT in March 2020 (on Eliquis) seen as a trauma alert s/p fall from bed onto floor, +HT, -LOC, +AC (on Eliquis).  Per EMS, patient frequently falls and she was attempting to get out of bed when she fell this time. Patient is very demented at baseline and no further history was able to be obtained. In the ED she is afebrile, /77, HR 67. She is saturating 97%.       PMH  PAST MEDICAL & SURGICAL HISTORY:  DVT, lower extremity, on Eliquis  Hypothyroidism  Hypertension  Dementia  severe, Alzheimer's dementia   H/O bilateral hip replacements    Home Meds:  Home Medications:  amLODIPine 5 mg oral tablet: 1 tab(s) orally once a day (07 Jul 2020 10:53)  bisacodyl 5 mg oral delayed release tablet: 1 tab(s) orally every 12 hours, As needed, Constipation (07 Jul 2020 10:53)  melatonin 5 mg oral tablet: 1 tab(s) orally once a day (at bedtime) (07 Jul 2020 10:53)  senna oral tablet: 2 tab(s) orally once a day (at bedtime) (07 Jul 2020 10:53)  sertraline 25 mg oral tablet: 1 tab(s) orally once a day (02 Jul 2020 16:32)    Allergies  Allergies  sulfonamides (Other)    Current Medications:  amLODIPine   Tablet 5 milliGRAM(s) Oral daily  bisacodyl 5 milliGRAM(s) Oral every 12 hours PRN Constipation  lactated ringers. 1000 milliLiter(s) (75 mL/Hr) IV Continuous <Continuous>  levothyroxine 75 MICROGram(s) Oral daily  multivitamin 1 Tablet(s) Oral daily  senna 2 Tablet(s) Oral at bedtime  sertraline 25 milliGRAM(s) Oral daily    Advanced Directives: Presumed Full Code    VITAL SIGNS, INS/OUTS (Last 24hours):    ICU Vital Signs Last 24 Hrs  T(C): 35.6 (12 Oct 2020 07:15), Max: 35.6 (12 Oct 2020 07:15)  T(F): 96.1 (12 Oct 2020 07:15), Max: 96.1 (12 Oct 2020 07:15)  HR: 67 (12 Oct 2020 07:15) (60 - 67)  BP: 166/77 (12 Oct 2020 07:15) (145/64 - 166/77)  RR: 17 (12 Oct 2020 07:15) (17 - 20)  SpO2: 97% (12 Oct 2020 07:15) (97% - 98%)    I&O's Summary    Height (cm): 154.9 (10-12-20)  Weight (kg): 45.4 (10-12-20)  BMI (kg/m2): 18.9 (10-12-20)  BSA (m2): 1.41 (10-12-20)    Physical Exam:   ---------------------------------------------------------------------------------------  GCS:      Exam: A&Ox3, no focal deficits    RESPIRATORY:  Normal expansion/effort  Mechanical Ventilation:     CARDIOVASCULAR:   S1/S2.  RRR  No peripheral edema    GASTROINTESTINAL:  Abdomen soft, non-tender, non-distended    MUSCULOSKELETAL:  Extremities warm, pink, well-perfused.    DERM:  No skin breakdown     :   Exam: Yousif catheter in place.     Tubes/Lines/Drains   ----------------------------------------------------------------------------------------------------------  [x] Peripheral IV    LABS  --------------------------------------------------------------------------------------  Labs:    POCT Blood Glucose.: 93 mg/dL (12 Oct 2020 04:27)                      11.0   7.18  )-----------( 271      ( 12 Oct 2020 04:34 )             34.8       Auto Neutrophil %: 66.3 % (10-12-20 @ 04:34)  Auto Immature Granulocyte %: 0.7 % (10-12-20 @ 04:34)    10-12    141  |  105  |  29<H>  ----------------------------<  102<H>  5.9<H>   |  26  |  1.1    Calcium, Total Serum: 9.1 mg/dL (10-12-20 @ 04:34)    LFTs:             6.4  | <0.2 | 34       ------------------[74      ( 12 Oct 2020 04:34 )  3.8  | x    | 12          Lipase:141        Blood Gas Venous - Lactate: 0.7 mmoL/L (10-12-20 @ 06:18)  Lactate, Blood: 1.2 mmol/L (10-12-20 @ 04:34)    Coags:     15.10  ----< 1.31    ( 12 Oct 2020 04:34 )     31.7      Alcohol, Blood: <10 mg/dL (10-12-20 @ 04:34)    RADIOLOGY & ADDITIONAL STUDIES  ----------------------------------------------------------------------------------------------  < from: Xray Pelvis AP only (10.12.20 @ 05:26) >  Findings/  impression:  Diffuse osteopenia.No acute displaced fracture or dislocation.  Healed fracture deformity of the bilateral intertrochanteric femurs, post right hip gamma nail fixation and left hip intramedullary nail with 2 proximal femoral interlocking screw fixation.  Moderate bilateral hip osteoarthritis.  Chronic left inferior pubic ramus fracture deformity.  There are degenerative changes of the sacroiliac joints and lower lumbar spine.    < from: CT Head No Cont (10.12.20 @ 06:46) >  IMPRESSION:  CT HEAD:  1.  Small focal acute subdural hematoma noted along the left anterior falx measuring approximately 4 mm in thickness.  2.  No evidence of mass effect or midline shift.  3. Stable chronic microvascular ischemic changes and parenchymal volume loss.  CT CERVICAL SPINE:  No acute fracture or subluxation.  Polypoid soft tissue lesion at the right tongue base has slightly decreased in size since the prior examination of10/29/2018.    < from: CT Chest w/ IV Cont (10.12.20 @ 06:52) >  IMPRESSION:No acute intrathoracic or intra-abdominal/pelvic traumatic changes.  Stable chronic T12 vertebral body mild compression fracture deformity.  T9 moderate vertebral body compression fracture of indeterminate age, new since October 2018.    < from: CT Abdomen and Pelvis w/ IV Cont (10.12.20 @ 06:53) >  IMPRESSION:No acute intrathoracic or intra-abdominal/pelvic traumatic changes.  Stable chronic T12 vertebral body mild compression fracture deformity.  T9 moderate vertebral body compression fracture of indeterminate age, new since October 2018.    < from: Transthoracic Echocardiogram (03.10.20 @ 10:25) >  Summary:   1. Normal global left ventricular systolic function.   2. LV Ejection Fraction by Deng's Method with a biplane EF of 51 %.   3. Normal left ventricular internal cavity size.   4. Spectral Doppler shows impaired relaxation patternof left ventricular myocardial filling (Grade I diastolic dysfunction).   5. Mildly enlarged right atrium.   6. Mild mitral annular calcification.   7. Moderate Mitral valve prolapse.   8. Moderate mitral valve regurgitation.   9. Moderate thickeningof the anterior and posterior mitral valve leaflets.  10. Sclerotic aortic valve with normal opening.  11. Estimated pulmonary artery systolic pressure is 42.3 mmHg assuming a right atrial pressure of 10 mmHg, which is consistent with mild pulmonary hypertension.  12. LA volume Index is 37.0 ml/m² ml/m2.  --------------------------------------------------------------------------------------  Admit Diagnosis: SUBDURAL HEMATOMA          SICU Consultation Note  ======================================================================================================  MARLENE BOO  MRN-[id number]    89yF w/PMHx of Alzheimer's dementia (A&Ox1), HTN, hypothyroidism, DVT in March 2020 (on Eliquis) seen as a trauma alert s/p fall from bed onto floor, +HT, -LOC, +AC (on Eliquis).  Per EMS, patient frequently falls and she was attempting to get out of bed when she fell this time. Patient is very demented at baseline and no further history was able to be obtained. In the ED she is afebrile, /77, HR 67. She is saturating 97%. On exam she is A&Ox1 (baseline). She does not easily follow commands, has +ROM in upper and lower extremities. She has no leukocytosis on labs, her Hgb is 11.0. Potassium significant for 5.9, hemolyzed. Pending repeat. Imaging demonstrated the followed     PMH  PAST MEDICAL & SURGICAL HISTORY:  DVT, lower extremity, on Eliquis  Hypothyroidism  Hypertension  Dementia  severe, Alzheimer's dementia   H/O bilateral hip replacements    Home Meds:  Home Medications:  amLODIPine 5 mg oral tablet: 1 tab(s) orally once a day (07 Jul 2020 10:53)  bisacodyl 5 mg oral delayed release tablet: 1 tab(s) orally every 12 hours, As needed, Constipation (07 Jul 2020 10:53)  melatonin 5 mg oral tablet: 1 tab(s) orally once a day (at bedtime) (07 Jul 2020 10:53)  senna oral tablet: 2 tab(s) orally once a day (at bedtime) (07 Jul 2020 10:53)  sertraline 25 mg oral tablet: 1 tab(s) orally once a day (02 Jul 2020 16:32)    Allergies  Allergies  sulfonamides (Other)    Current Medications:  amLODIPine   Tablet 5 milliGRAM(s) Oral daily  bisacodyl 5 milliGRAM(s) Oral every 12 hours PRN Constipation  lactated ringers. 1000 milliLiter(s) (75 mL/Hr) IV Continuous <Continuous>  levothyroxine 75 MICROGram(s) Oral daily  multivitamin 1 Tablet(s) Oral daily  senna 2 Tablet(s) Oral at bedtime  sertraline 25 milliGRAM(s) Oral daily    Advanced Directives: Presumed Full Code    VITAL SIGNS, INS/OUTS (Last 24hours):    ICU Vital Signs Last 24 Hrs  T(C): 35.6 (12 Oct 2020 07:15), Max: 35.6 (12 Oct 2020 07:15)  T(F): 96.1 (12 Oct 2020 07:15), Max: 96.1 (12 Oct 2020 07:15)  HR: 67 (12 Oct 2020 07:15) (60 - 67)  BP: 166/77 (12 Oct 2020 07:15) (145/64 - 166/77)  RR: 17 (12 Oct 2020 07:15) (17 - 20)  SpO2: 97% (12 Oct 2020 07:15) (97% - 98%)    I&O's Summary    Height (cm): 154.9 (10-12-20)  Weight (kg): 45.4 (10-12-20)  BMI (kg/m2): 18.9 (10-12-20)  BSA (m2): 1.41 (10-12-20)    Physical Exam:   ---------------------------------------------------------------------------------------  GCS:      Exam: A&Ox3, no focal deficits    RESPIRATORY:  Normal expansion/effort  Mechanical Ventilation:     CARDIOVASCULAR:   S1/S2.  RRR  No peripheral edema    GASTROINTESTINAL:  Abdomen soft, non-tender, non-distended    MUSCULOSKELETAL:  Extremities warm, pink, well-perfused.    DERM:  No skin breakdown     :   Exam: Yousif catheter in place.     Tubes/Lines/Drains   ----------------------------------------------------------------------------------------------------------  [x] Peripheral IV    LABS  --------------------------------------------------------------------------------------  Labs:    POCT Blood Glucose.: 93 mg/dL (12 Oct 2020 04:27)                      11.0   7.18  )-----------( 271      ( 12 Oct 2020 04:34 )             34.8       Auto Neutrophil %: 66.3 % (10-12-20 @ 04:34)  Auto Immature Granulocyte %: 0.7 % (10-12-20 @ 04:34)    10-12    141  |  105  |  29<H>  ----------------------------<  102<H>  5.9<H>   |  26  |  1.1    Calcium, Total Serum: 9.1 mg/dL (10-12-20 @ 04:34)    LFTs:             6.4  | <0.2 | 34       ------------------[74      ( 12 Oct 2020 04:34 )  3.8  | x    | 12          Lipase:141        Blood Gas Venous - Lactate: 0.7 mmoL/L (10-12-20 @ 06:18)  Lactate, Blood: 1.2 mmol/L (10-12-20 @ 04:34)    Coags:     15.10  ----< 1.31    ( 12 Oct 2020 04:34 )     31.7      Alcohol, Blood: <10 mg/dL (10-12-20 @ 04:34)    RADIOLOGY & ADDITIONAL STUDIES  ----------------------------------------------------------------------------------------------  < from: Xray Pelvis AP only (10.12.20 @ 05:26) >  Findings/  impression:  Diffuse osteopenia.No acute displaced fracture or dislocation.  Healed fracture deformity of the bilateral intertrochanteric femurs, post right hip gamma nail fixation and left hip intramedullary nail with 2 proximal femoral interlocking screw fixation.  Moderate bilateral hip osteoarthritis.  Chronic left inferior pubic ramus fracture deformity.  There are degenerative changes of the sacroiliac joints and lower lumbar spine.    < from: CT Head No Cont (10.12.20 @ 06:46) >  IMPRESSION:  CT HEAD:  1.  Small focal acute subdural hematoma noted along the left anterior falx measuring approximately 4 mm in thickness.  2.  No evidence of mass effect or midline shift.  3. Stable chronic microvascular ischemic changes and parenchymal volume loss.  CT CERVICAL SPINE:  No acute fracture or subluxation.  Polypoid soft tissue lesion at the right tongue base has slightly decreased in size since the prior examination of10/29/2018.    < from: CT Chest w/ IV Cont (10.12.20 @ 06:52) >  IMPRESSION:No acute intrathoracic or intra-abdominal/pelvic traumatic changes.  Stable chronic T12 vertebral body mild compression fracture deformity.  T9 moderate vertebral body compression fracture of indeterminate age, new since October 2018.    < from: CT Abdomen and Pelvis w/ IV Cont (10.12.20 @ 06:53) >  IMPRESSION:No acute intrathoracic or intra-abdominal/pelvic traumatic changes.  Stable chronic T12 vertebral body mild compression fracture deformity.  T9 moderate vertebral body compression fracture of indeterminate age, new since October 2018.    < from: Transthoracic Echocardiogram (03.10.20 @ 10:25) >  Summary:   1. Normal global left ventricular systolic function.   2. LV Ejection Fraction by Deng's Method with a biplane EF of 51 %.   3. Normal left ventricular internal cavity size.   4. Spectral Doppler shows impaired relaxation patternof left ventricular myocardial filling (Grade I diastolic dysfunction).   5. Mildly enlarged right atrium.   6. Mild mitral annular calcification.   7. Moderate Mitral valve prolapse.   8. Moderate mitral valve regurgitation.   9. Moderate thickeningof the anterior and posterior mitral valve leaflets.  10. Sclerotic aortic valve with normal opening.  11. Estimated pulmonary artery systolic pressure is 42.3 mmHg assuming a right atrial pressure of 10 mmHg, which is consistent with mild pulmonary hypertension.  12. LA volume Index is 37.0 ml/m² ml/m2.  --------------------------------------------------------------------------------------  Admit Diagnosis: SUBDURAL HEMATOMA          SICU Consultation Note  ======================================================================================================  MARLENE BOO  MRN-[id number]    89yF w/PMHx of Alzheimer's dementia (A&Ox1), HTN, hypothyroidism, DVT in 2020 (on Eliquis) seen as a trauma alert s/p fall from bed onto floor, +HT, -LOC, +AC (on Eliquis).  Per EMS, patient frequently falls and she was attempting to get out of bed when she fell this time. Patient is very demented at baseline and no further history was able to be obtained. In the ED she is afebrile, /77, HR 67. She is saturating 97%. On exam she is A&Ox1 (baseline). She does not easily follow commands, has +ROM in upper and lower extremities. She has no leukocytosis on labs, her Hgb is 11.0. Potassium significant for 5.9, hemolyzed. Pending repeat. Imaging demonstrated the followin.  Small focal acute subdural hematoma noted along the left anterior falx measuring approximately 4 mm in thickness    Patient was seen by Neurosurgery for CTH findings, Eliquis held and reversed. Recommending repeat CTH tomorrow AM, initiating Keppra 250 Q12 for 7 days, and Q1 hour neurologic checks.     PMH  PAST MEDICAL & SURGICAL HISTORY:  DVT, lower extremity, on Eliquis  Hypothyroidism  Hypertension  Dementia  severe, Alzheimer's dementia   H/O bilateral hip replacements    Home Meds:  Home Medications:  amLODIPine 5 mg oral tablet: 1 tab(s) orally once a day (2020 10:53)  bisacodyl 5 mg oral delayed release tablet: 1 tab(s) orally every 12 hours, As needed, Constipation (2020 10:53)  melatonin 5 mg oral tablet: 1 tab(s) orally once a day (at bedtime) (2020 10:53)  senna oral tablet: 2 tab(s) orally once a day (at bedtime) (2020 10:53)  sertraline 25 mg oral tablet: 1 tab(s) orally once a day (2020 16:32)    Allergies  Allergies  sulfonamides (Other)    Current Medications:  amLODIPine   Tablet 5 milliGRAM(s) Oral daily  bisacodyl 5 milliGRAM(s) Oral every 12 hours PRN Constipation  lactated ringers. 1000 milliLiter(s) (75 mL/Hr) IV Continuous <Continuous>  levothyroxine 75 MICROGram(s) Oral daily  multivitamin 1 Tablet(s) Oral daily  senna 2 Tablet(s) Oral at bedtime  sertraline 25 milliGRAM(s) Oral daily    Advanced Directives: Presumed Full Code    VITAL SIGNS, INS/OUTS (Last 24hours):    ICU Vital Signs Last 24 Hrs  T(C): 35.6 (12 Oct 2020 07:15), Max: 35.6 (12 Oct 2020 07:15)  T(F): 96.1 (12 Oct 2020 07:15), Max: 96.1 (12 Oct 2020 07:15)  HR: 67 (12 Oct 2020 07:15) (60 - 67)  BP: 166/77 (12 Oct 2020 07:15) (145/64 - 166/77)  RR: 17 (12 Oct 2020 07:15) (17 - 20)  SpO2: 97% (12 Oct 2020 07:15) (97% - 98%)    I&O's Summary    Height (cm): 154.9 (10-12-20)  Weight (kg): 45.4 (10-12-20)  BMI (kg/m2): 18.9 (10-12-20)  BSA (m2): 1.41 (10-12-20)    Physical Exam:   ---------------------------------------------------------------------------------------  GCS:      Exam: A&Ox3, no focal deficits    RESPIRATORY:  Normal expansion/effort  Mechanical Ventilation:     CARDIOVASCULAR:   S1/S2.  RRR  No peripheral edema    GASTROINTESTINAL:  Abdomen soft, non-tender, non-distended    MUSCULOSKELETAL:  Extremities warm, pink, well-perfused.    DERM:  No skin breakdown     :   Exam: Yousif catheter in place.     Tubes/Lines/Drains   ----------------------------------------------------------------------------------------------------------  [x] Peripheral IV    LABS  --------------------------------------------------------------------------------------  Labs:    POCT Blood Glucose.: 93 mg/dL (12 Oct 2020 04:27)                      11.0   7.18  )-----------( 271      ( 12 Oct 2020 04:34 )             34.8       Auto Neutrophil %: 66.3 % (10-12-20 @ 04:34)  Auto Immature Granulocyte %: 0.7 % (10-12-20 @ 04:34)    10-12    141  |  105  |  29<H>  ----------------------------<  102<H>  5.9<H>   |  26  |  1.1    Calcium, Total Serum: 9.1 mg/dL (10-12-20 @ 04:34)    LFTs:             6.4  | <0.2 | 34       ------------------[74      ( 12 Oct 2020 04:34 )  3.8  | x    | 12          Lipase:141        Blood Gas Venous - Lactate: 0.7 mmoL/L (10-12-20 @ 06:18)  Lactate, Blood: 1.2 mmol/L (10-12-20 @ 04:34)    Coags:     15.10  ----< 1.31    ( 12 Oct 2020 04:34 )     31.7      Alcohol, Blood: <10 mg/dL (10-12-20 @ 04:34)    RADIOLOGY & ADDITIONAL STUDIES  ----------------------------------------------------------------------------------------------  < from: Xray Pelvis AP only (10.12.20 @ 05:26) >  Findings/  impression:  Diffuse osteopenia.No acute displaced fracture or dislocation.  Healed fracture deformity of the bilateral intertrochanteric femurs, post right hip gamma nail fixation and left hip intramedullary nail with 2 proximal femoral interlocking screw fixation.  Moderate bilateral hip osteoarthritis.  Chronic left inferior pubic ramus fracture deformity.  There are degenerative changes of the sacroiliac joints and lower lumbar spine.    < from: CT Head No Cont (10.12.20 @ 06:46) >  IMPRESSION:  CT HEAD:  1.  Small focal acute subdural hematoma noted along the left anterior falx measuring approximately 4 mm in thickness.  2.  No evidence of mass effect or midline shift.  3. Stable chronic microvascular ischemic changes and parenchymal volume loss.  CT CERVICAL SPINE:  No acute fracture or subluxation.  Polypoid soft tissue lesion at the right tongue base has slightly decreased in size since the prior examination of10/.    < from: CT Chest w/ IV Cont (10.12.20 @ 06:52) >  IMPRESSION:No acute intrathoracic or intra-abdominal/pelvic traumatic changes.  Stable chronic T12 vertebral body mild compression fracture deformity.  T9 moderate vertebral body compression fracture of indeterminate age, new since 2018.    < from: CT Abdomen and Pelvis w/ IV Cont (10.12.20 @ 06:53) >  IMPRESSION:No acute intrathoracic or intra-abdominal/pelvic traumatic changes.  Stable chronic T12 vertebral body mild compression fracture deformity.  T9 moderate vertebral body compression fracture of indeterminate age, new since 2018.    < from: Transthoracic Echocardiogram (03.10.20 @ 10:25) >  Summary:   1. Normal global left ventricular systolic function.   2. LV Ejection Fraction by Deng's Method with a biplane EF of 51%.   3. Normal left ventricular internal cavity size.   4. Spectral Doppler shows impaired relaxation patternof left ventricular myocardial filling (Grade I diastolic dysfunction).   5. Mildly enlarged right atrium.   6. Mild mitral annular calcification.   7. Moderate Mitral valve prolapse.   8. Moderate mitral valve regurgitation.   9. Moderate thickeningof the anterior and posterior mitral valve leaflets.  10. Sclerotic aortic valve with normal opening.  11. Estimated pulmonary artery systolic pressure is 42.3 mmHg assuming a right atrial pressure of 10 mmHg, which is consistent with mild pulmonary hypertension.  12. LA volume Index is 37.0 ml/m² ml/m2.  --------------------------------------------------------------------------------------  Admit Diagnosis: SUBDURAL HEMATOMA

## 2020-10-12 NOTE — CONSULT NOTE ADULT - ATTENDING COMMENTS
small SDH   on ACT   admit to SICU   Q 1 hour neuro checks   seizure prophylaxis   repeat head CT   NS consult
I examined the patient with the PA and discussed my plan with the resident.  I personally provided over 30 minutes of direct critical care to this patient. Agree with above note unless directly contradicted below.      89yF w/PMHx of Alzheimer's dementia (A&Ox1), HTN, hypothyroidism, DVT in March 2020 (on Eliquis) seen as a trauma alert s/p fall from bed onto floor found to have small SDH.     N:   GCS 14 ( baseline dementia). Requires 1:1 sitter.   Small SDH after fall on eliquis. KCentra given. Neurosx consulted- Dony, q1NC , CTH in am     R: 100% Ra    C: EKG no ischemic changes. VSS. Last echo- (3/10/20): EF of 51%    GI- NPO until after 2nd scan. NS @ 75. Bowel regimen.     - HYperkalemia 5.9 --> bmp repeated K - 4.3 . Voiding freely no moore. Measure I/Os if patient compliant     Heme  daily cbc, hold Eliquis s/p Kcentra, hold dvt ppx, hold any AC or antiplatelet    Dispo: continue sicu monitoring until repeat CTH.

## 2020-10-12 NOTE — ED ADULT TRIAGE NOTE - CHIEF COMPLAINT QUOTE
BIBA with complaints of S/P fall while getting from bed, + head laceration, unknown LOC, + Eliquis BIBA with complaints of S/P fall while getting out from bed, + head laceration, unknown LOC, + Eliquis

## 2020-10-12 NOTE — ED PROVIDER NOTE - OBJECTIVE STATEMENT
89 y.o F w/ pmhx dvt on eliquis, severe alzheimers, HTN, hypothyroid, bibems from home for fall. Pt was trying to get out of bed and fell. EMS found that pt sustained laceration to back of head. Otherwise, unable to acquire any further information from patient due to her medical condition.

## 2020-10-12 NOTE — CONSULT NOTE ADULT - SUBJECTIVE AND OBJECTIVE BOX
HPI:  89 y.o F w/ pmhx dvt on eliquis, severe alzheimers, HTN, hypothyroid, bibems from home for fall. Pt was trying to get out of bed and fell. EMS found that pt sustained laceration to back of head. Otherwise, unable to acquire any further information from patient due to her medical condition    PAST MEDICAL & SURGICAL HISTORY:  DVT, lower extremity  on eliquis    Hypothyroidism    Hypertension    Dementia  severe, Alzheimers    H/O bilateral hip replacements        Home Medications:  amLODIPine 5 mg oral tablet: 1 tab(s) orally once a day (07 Jul 2020 10:53)  bisacodyl 5 mg oral delayed release tablet: 1 tab(s) orally every 12 hours, As needed, Constipation (07 Jul 2020 10:53)  melatonin 5 mg oral tablet: 1 tab(s) orally once a day (at bedtime) (07 Jul 2020 10:53)  senna oral tablet: 2 tab(s) orally once a day (at bedtime) (07 Jul 2020 10:53)  sertraline 25 mg oral tablet: 1 tab(s) orally once a day (02 Jul 2020 16:32)      Allergies    sulfonamides (Other)    Intolerances        ROS:  [x] A ten-point review of systems is negative except as noted   [  ] Due to altered mental status/intubation, subjective information were not able to be obtained from the patient. History was obtained, to the extent possible, from review of the chart and collateral sources of information    MEDICATIONS  (STANDING):    MEDICATIONS  (PRN):      ICU Vital Signs Last 24 Hrs  T(C): 35.6 (12 Oct 2020 07:15), Max: 35.6 (12 Oct 2020 07:15)  T(F): 96.1 (12 Oct 2020 07:15), Max: 96.1 (12 Oct 2020 07:15)  HR: 67 (12 Oct 2020 07:15) (60 - 67)  BP: 166/77 (12 Oct 2020 07:15) (145/64 - 166/77)  BP(mean): --  ABP: --  ABP(mean): --  RR: 17 (12 Oct 2020 07:15) (17 - 20)  SpO2: 97% (12 Oct 2020 07:15) (97% - 98%)      I&O's Detail      CBC Full  -  ( 12 Oct 2020 04:34 )  WBC Count : 7.18 K/uL  RBC Count : 3.62 M/uL  Hemoglobin : 11.0 g/dL  Hematocrit : 34.8 %  Platelet Count - Automated : 271 K/uL  Mean Cell Volume : 96.1 fL  Mean Cell Hemoglobin : 30.4 pg  Mean Cell Hemoglobin Concentration : 31.6 g/dL  Auto Neutrophil # : 4.76 K/uL  Auto Lymphocyte # : 1.45 K/uL  Auto Monocyte # : 0.58 K/uL  Auto Eosinophil # : 0.29 K/uL  Auto Basophil # : 0.05 K/uL  Auto Neutrophil % : 66.3 %  Auto Lymphocyte % : 20.2 %  Auto Monocyte % : 8.1 %  Auto Eosinophil % : 4.0 %  Auto Basophil % : 0.7 %    10-12    141  |  105  |  29<H>  ----------------------------<  102<H>  5.9<H>   |  26  |  1.1    Ca    9.1      12 Oct 2020 04:34    TPro  6.4  /  Alb  3.8  /  TBili  <0.2  /  DBili  x   /  AST  34  /  ALT  12  /  AlkPhos  74  10-12          Exam:  AAOX1 (to person only), Verbal function intact  PERRL, EOM's intact  Motor: MAEx4, 5/5   Sensation to light touch grossly intact  2 cm laceration to occiput C/D/I    Imaging:  < from: CT Head No Cont (10.12.20 @ 06:46) >  IMPRESSION:    CT HEAD:  1.  Small focal acute subdural hematoma noted along the left anterior falx measuring approximately 4 mm in thickness.  2.  No evidence of mass effect or midline shift.  3. Stable chronic microvascular ischemic changes and parenchymal volume loss.    CT CERVICAL SPINE:  No acute fracture or subluxation.    Polypoid soft tissue lesion at the right tongue base has slightly decreased in size since the prior examination of10/29/2018.    Assessment/Plan:  89 yo female s/p fall. CTH demonstrating 4mm acute SDH along L anterior falx   -Neuro checks q1hr, ICU monitoring  -Hold and reverse eliquis  -Repeat electrolytes  -Repeat CTH tomorrow AM  -Start Keppra 250 Q12 x 7days  -Imaging reviewed with Dr. Brewer

## 2020-10-12 NOTE — ED PROVIDER NOTE - ATTENDING CONTRIBUTION TO CARE
89 yr old f w/ a pmh significant for htn, hypothyroid, severe alzheimer, dementia, dvt (eliquis) who presents today s/p fall. Medical information was provided by EMS, as pt is severely demented and unable to provide us with any information. As per EMS, pt fell attempting to get out of the bed. Family denied any other medical complaints to EMS.     Review of Systems  unable to obtain due to mental status    VITAL SIGNS: I have reviewed nursing notes and confirm.  CONSTITUTIONAL: non-toxic, well appearing  SKIN: no rash, no petechiae.  EYES: PERRL, EOMI, pink conjunctiva, anicteric  ENT: tongue midline, no exudates, MMM. there is a 2 cm laceration, not actively bleeding on the occipital area.   NECK: Supple; no meningismus, no JVD  CARD: RRR, no murmurs, equal radial pulses bilaterally 2+  RESP: CTAB, no respiratory distress  ABD: Soft, non-tender, non-distended, no peritoneal signs, no HSM, no CVA tenderness  EXT: Normal ROM x4. No edema. No calves tenderness  NEURO: AAOX1 (at pts baseline) moving all four extremities.   PSYCH: Cooperative, appropriate.    a/p  89 yr old f that presents s/p fall, concern for possible intracranial bleed (pt of high risk)  -labs  -imaging  -ua  -dispo as per above

## 2020-10-12 NOTE — CONSULT NOTE ADULT - ASSESSMENT
This is a 89yF w/ PMHx of Alzheimer's dementia, HTN, hypothyroidism, DVT (on Eliquis) seen as a trauma alert s/p fall from bed onto floor, +HT, -LOC, +AC (on Eliquis). External signs of trauma include 2cm occipital scalp laceration, not actively bleeding. Trauma assessment in ED: ABCs intact , GCS 13 , AAOx1 (at baseline),  MAY spontaneously.     Injuries identified:   - 2cm occipital scalp laceration    PLAN:   - Pan scan: CT head, C-spine, chest, abd/pelvis  - Trauma labs: CBC, BMP, Coags, T&S, ETOH level, UA This is a 89yF w/ PMHx of Alzheimer's dementia, HTN, hypothyroidism, DVT (on Eliquis) seen as a trauma alert s/p fall from bed onto floor, +HT, -LOC, +AC (on Eliquis). External signs of trauma include 2cm occipital scalp laceration, not actively bleeding. Trauma assessment in ED: ABCs intact , GCS 13 , AAOx1 (at baseline),  MAY spontaneously. CT head shows acute SDH for which neurosurgery is following     PLAN:   - Neurosurgery to reverse Eliquis   - Admit to trauma   - ICU consult   - Home meds   - NPO  - IVF  - Trauma labs: CBC, BMP, Coags, T&S, ETOH level, UA

## 2020-10-12 NOTE — H&P ADULT - NSHPPHYSICALEXAM_GEN_ALL_CORE
General: Well-appearing female in no acute distress, AAOx1 (at baseline).  HEENT: 2cm scalp laceration noted to the occipital area without active bleeding. EOMI, PERRL.  Neck: Soft, midline trachea. No C-spine tenderness.  Chest: No chest wall tenderness, no subcutaneous emphysema.  Cardiac: S1, S2, RRR.  Respiratory: Bilateral breath sounds, clear and equal bilaterally  Abdomen: Soft, non-distended, non-tender, no rebound, no guarding.  Ext:  Moving b/l upper and lower extremities spontaneously. Palpable Radial b/l UE, b/l DP palpable in LE.   Back: No T/L/S spine tenderness, No palpable step off or deformity.

## 2020-10-12 NOTE — CONSULT NOTE ADULT - SUBJECTIVE AND OBJECTIVE BOX
TRAUMA ACTIVATION LEVEL:      MECHANISM OF INJURY:   [] Blunt     [] MVC	  [X] Fall	  [] Pedestrian Struck	  [] Motorcycle     [] Assault     [] Bicycle collision    [] Sports injury    [] Penetrating    [] Gun Shot Wound      [] Stab Wound    GCS: 13        E: 4     V: 4     M: 5    HPI:  89yF w/ PMHx of Alzheimer's dementia, HTN, hypothyroidism, DVT (on Eliquis) seen as a trauma alert s/p fall from bed onto floor, +HT, -LOC, +AC (on Eliquis).  Per EMS, patient frequently falls and she was attempting to get out of bed when she fell this time. Patient is very demented at baseline and no further history was able to be obtained.   Trauma assessment in ED: ABCs intact , GCS 13 , AAOx1 (at baseline).    PAST MEDICAL & SURGICAL HISTORY:  DVT, lower extremity - on eliquis  Hypothyroidism  Hypertension  Dementia - severe, Alzheimers  H/O bilateral hip replacements    Allergies  sulfonamides (Other)    Home Medications:  amLODIPine 5 mg oral tablet: 1 tab(s) orally once a day (07 Jul 2020 10:53)  bisacodyl 5 mg oral delayed release tablet: 1 tab(s) orally every 12 hours, As needed, Constipation (07 Jul 2020 10:53)  melatonin 5 mg oral tablet: 1 tab(s) orally once a day (at bedtime) (07 Jul 2020 10:53)  senna oral tablet: 2 tab(s) orally once a day (at bedtime) (07 Jul 2020 10:53)  sertraline 25 mg oral tablet: 1 tab(s) orally once a day (02 Jul 2020 16:32)    ROS: Unable to obtain secondary to baseline mental status    Primary Survey:    A - airway intact  B - bilateral breath sounds and good chest rise  C - palpable pulses in all extremities  D -  GCS 13, MAY  Exposure obtained    Vital Signs Last 24 Hrs  HR: 60 (12 Oct 2020 04:11) (60 - 60)  BP: 145/64 (12 Oct 2020 04:11) (145/64 - 145/64)  RR: 20 (12 Oct 2020 04:11) (20 - 20)  SpO2: 98% (12 Oct 2020 04:11) (98% - 98%)    Secondary Survey:   General: Well-appearing female in no acute distress, AAOx1 (at baseline).  HEENT: 2cm scalp laceration noted to the occipital area without active bleeding. EOMI, PERRL.  Neck: Soft, midline trachea. No C-spine tenderness.  Chest: No chest wall tenderness, no subcutaneous emphysema.  Cardiac: S1, S2, RRR.  Respiratory: Bilateral breath sounds, clear and equal bilaterally  Abdomen: Soft, non-distended, non-tender, no rebound, no guarding.  Ext:  Moving b/l upper and lower extremities spontaneously. Palpable Radial b/l UE, b/l DP palpable in LE.   Back: No T/L/S spine tenderness, No palpable step off or deformity.    FAST: Negative    Labs:  CAPILLARY BLOOD GLUCOSE  POCT Blood Glucose.: 93 mg/dL (12 Oct 2020 04:27)               11.0   7.18  )-----------( 271      ( 12 Oct 2020 04:34 )             34.8       Auto Neutrophil %: 66.3 % (10-12-20 @ 04:34)  Auto Immature Granulocyte %: 0.7 % (10-12-20 @ 04:34)    RADIOLOGY & ADDITIONAL STUDIES:  Pending TRAUMA ACTIVATION LEVEL:      MECHANISM OF INJURY:   [] Blunt     [] MVC	  [X] Fall	  [] Pedestrian Struck	  [] Motorcycle     [] Assault     [] Bicycle collision    [] Sports injury    [] Penetrating    [] Gun Shot Wound      [] Stab Wound    GCS: 13        E: 4     V: 4     M: 5    HPI:  89yF w/ PMHx of Alzheimer's dementia, HTN, hypothyroidism, DVT (on Eliquis) seen as a trauma alert s/p fall from bed onto floor, +HT, -LOC, +AC (on Eliquis).  Per EMS, patient frequently falls and she was attempting to get out of bed when she fell this time. Patient is very demented at baseline and no further history was able to be obtained.   Trauma assessment in ED: ABCs intact , GCS 13 , AAOx1 (at baseline).    PAST MEDICAL & SURGICAL HISTORY:  DVT, lower extremity - on eliquis  Hypothyroidism  Hypertension  Dementia - severe, Alzheimers  H/O bilateral hip replacements    Allergies  sulfonamides (Other)    Home Medications:  amLODIPine 5 mg oral tablet: 1 tab(s) orally once a day (07 Jul 2020 10:53)  bisacodyl 5 mg oral delayed release tablet: 1 tab(s) orally every 12 hours, As needed, Constipation (07 Jul 2020 10:53)  melatonin 5 mg oral tablet: 1 tab(s) orally once a day (at bedtime) (07 Jul 2020 10:53)  senna oral tablet: 2 tab(s) orally once a day (at bedtime) (07 Jul 2020 10:53)  sertraline 25 mg oral tablet: 1 tab(s) orally once a day (02 Jul 2020 16:32)    ROS: Unable to obtain secondary to baseline mental status    Primary Survey:    A - airway intact  B - bilateral breath sounds and good chest rise  C - palpable pulses in all extremities  D -  GCS 13, MAY  Exposure obtained    Vital Signs Last 24 Hrs  HR: 60 (12 Oct 2020 04:11) (60 - 60)  BP: 145/64 (12 Oct 2020 04:11) (145/64 - 145/64)  RR: 20 (12 Oct 2020 04:11) (20 - 20)  SpO2: 98% (12 Oct 2020 04:11) (98% - 98%)    Secondary Survey:   General: Well-appearing female in no acute distress, AAOx1 (at baseline).  HEENT: 2cm scalp laceration noted to the occipital area without active bleeding. EOMI, PERRL.  Neck: Soft, midline trachea. No C-spine tenderness.  Chest: No chest wall tenderness, no subcutaneous emphysema.  Cardiac: S1, S2, RRR.  Respiratory: Bilateral breath sounds, clear and equal bilaterally  Abdomen: Soft, non-distended, non-tender, no rebound, no guarding.  Ext:  Moving b/l upper and lower extremities spontaneously. Palpable Radial b/l UE, b/l DP palpable in LE.   Back: No T/L/S spine tenderness, No palpable step off or deformity.    FAST: Negative    Labs:  CAPILLARY BLOOD GLUCOSE  POCT Blood Glucose.: 93 mg/dL (12 Oct 2020 04:27)                        11.0   7.18  )-----------( 271      ( 12 Oct 2020 04:34 )             34.8       Auto Neutrophil %: 66.3 % (10-12-20 @ 04:34)  Auto Immature Granulocyte %: 0.7 % (10-12-20 @ 04:34)    10-12  141  |  105  |  29<H>  ----------------------------<  102<H>  5.9<H>   |  26  |  1.1    Calcium, Total Serum: 9.1 mg/dL (10-12-20 @ 04:34)    LFTs:       6.4  | <0.2 | 34       ------------------[74      ( 12 Oct 2020 04:34 )  3.8  | x    | 12          Lipase:141      Blood Gas Venous - Lactate: 0.7 mmoL/L (10-12-20 @ 06:18)  Lactate, Blood: 1.2 mmol/L (10-12-20 @ 04:34)    Coags:  15.10  ----< 1.31    ( 12 Oct 2020 04:34 )     31.7      Alcohol, Blood: <10 mg/dL (10-12-20 @ 04:34)    RADIOLOGY & ADDITIONAL STUDIES:  Pending TRAUMA ACTIVATION LEVEL:      MECHANISM OF INJURY:   [] Blunt     [] MVC	  [X] Fall	  [] Pedestrian Struck	  [] Motorcycle     [] Assault     [] Bicycle collision    [] Sports injury    [] Penetrating    [] Gun Shot Wound      [] Stab Wound    GCS: 13        E: 4     V: 4     M: 5    HPI:  89yF w/ PMHx of Alzheimer's dementia, HTN, hypothyroidism, DVT (on Eliquis) seen as a trauma alert s/p fall from bed onto floor, +HT, -LOC, +AC (on Eliquis).  Per EMS, patient frequently falls and she was attempting to get out of bed when she fell this time. Patient is very demented at baseline and no further history was able to be obtained.   Trauma assessment in ED: ABCs intact , GCS 13 , AAOx1 (at baseline).    PAST MEDICAL & SURGICAL HISTORY:  DVT, lower extremity - on eliquis  Hypothyroidism  Hypertension  Dementia - severe, Alzheimers  H/O bilateral hip replacements    Allergies  sulfonamides (Other)    Home Medications:  amLODIPine 5 mg oral tablet: 1 tab(s) orally once a day (07 Jul 2020 10:53)  bisacodyl 5 mg oral delayed release tablet: 1 tab(s) orally every 12 hours, As needed, Constipation (07 Jul 2020 10:53)  melatonin 5 mg oral tablet: 1 tab(s) orally once a day (at bedtime) (07 Jul 2020 10:53)  senna oral tablet: 2 tab(s) orally once a day (at bedtime) (07 Jul 2020 10:53)  sertraline 25 mg oral tablet: 1 tab(s) orally once a day (02 Jul 2020 16:32)    ROS: Unable to obtain secondary to baseline mental status    Primary Survey:    A - airway intact  B - bilateral breath sounds and good chest rise  C - palpable pulses in all extremities  D -  GCS 13, MAY  Exposure obtained    Vital Signs Last 24 Hrs  HR: 60 (12 Oct 2020 04:11) (60 - 60)  BP: 145/64 (12 Oct 2020 04:11) (145/64 - 145/64)  RR: 20 (12 Oct 2020 04:11) (20 - 20)  SpO2: 98% (12 Oct 2020 04:11) (98% - 98%)    Secondary Survey:   General: Well-appearing female in no acute distress, AAOx1 (at baseline).  HEENT: 2cm scalp laceration noted to the occipital area without active bleeding. EOMI, PERRL.  Neck: Soft, midline trachea. No C-spine tenderness.  Chest: No chest wall tenderness, no subcutaneous emphysema.  Cardiac: S1, S2, RRR.  Respiratory: Bilateral breath sounds, clear and equal bilaterally  Abdomen: Soft, non-distended, non-tender, no rebound, no guarding.  Ext:  Moving b/l upper and lower extremities spontaneously. Palpable Radial b/l UE, b/l DP palpable in LE.   Back: No T/L/S spine tenderness, No palpable step off or deformity.    FAST: Negative    Labs:  CAPILLARY BLOOD GLUCOSE  POCT Blood Glucose.: 93 mg/dL (12 Oct 2020 04:27)                        11.0   7.18  )-----------( 271      ( 12 Oct 2020 04:34 )             34.8       Auto Neutrophil %: 66.3 % (10-12-20 @ 04:34)  Auto Immature Granulocyte %: 0.7 % (10-12-20 @ 04:34)    10-12  141  |  105  |  29<H>  ----------------------------<  102<H>  5.9<H>   |  26  |  1.1    Calcium, Total Serum: 9.1 mg/dL (10-12-20 @ 04:34)    LFTs:       6.4  | <0.2 | 34       ------------------[74      ( 12 Oct 2020 04:34 )  3.8  | x    | 12          Lipase:141      Blood Gas Venous - Lactate: 0.7 mmoL/L (10-12-20 @ 06:18)  Lactate, Blood: 1.2 mmol/L (10-12-20 @ 04:34)    Coags:  15.10  ----< 1.31    ( 12 Oct 2020 04:34 )     31.7      Alcohol, Blood: <10 mg/dL (10-12-20 @ 04:34)    RADIOLOGY & ADDITIONAL STUDIES:  < from: Xray Pelvis AP only (10.12.20 @ 05:26) >  impression:    Diffuse osteopenia.No acute displaced fracture or dislocation.    Healed fracture deformity of the bilateral intertrochanteric femurs, post right hip gamma nail fixation and left hip intramedullary nail with 2 proximal femoral interlocking screw fixation.    Moderate bilateral hip osteoarthritis.    Chronic left inferior pubic ramus fracture deformity.    There are degenerative changes of the sacroiliac joints and lower lumbar spine.    < end of copied text >      < from: CT Head No Cont (10.12.20 @ 06:46) >  IMPRESSION:    CT HEAD:  1.  Small focal acute subdural hematoma noted along the left anterior falx measuring approximately 4 mm in thickness.  2.  No evidence of mass effect or midline shift.  3. Stable chronic microvascular ischemic changes and parenchymal volume loss.    CT CERVICAL SPINE:  No acute fracture or subluxation.    Polypoid soft tissue lesion at the right tongue base has slightly decreased in size since the prior examination of10/29/2018.    < end of copied text >    < from: CT Chest w/ IV Cont (10.12.20 @ 06:52) >  IMPRESSION:No acute intrathoracic or intra-abdominal/pelvic traumatic changes.    Stable chronic T12 vertebral body mild compression fracture deformity.    T9 moderate vertebral body compression fracture of indeterminate age, new since October 2018.      < end of copied text >

## 2020-10-12 NOTE — H&P ADULT - NSHPLABSRESULTS_GEN_ALL_CORE
LABS:                         11.0   7.18  )-----------( 271      ( 12 Oct 2020 04:34 )             34.8     10-12    141  |  105  |  29<H>  ----------------------------<  102<H>  5.9<H>   |  26  |  1.1    Ca    9.1      12 Oct 2020 04:34    TPro  6.4  /  Alb  3.8  /  TBili  <0.2  /  DBili  x   /  AST  34  /  ALT  12  /  AlkPhos  74  10-12    PT/INR - ( 12 Oct 2020 04:34 )   PT: 15.10 sec;   INR: 1.31 ratio    PTT - ( 12 Oct 2020 04:34 )  PTT:31.7 sec    Lactate, Blood: 1.2 mmol/L (10-12 @ 04:34)    RADIOLOGY, EKG & ADDITIONAL TESTS: Reviewed.   < from: CT Head No Cont (10.12.20 @ 06:46) >    IMPRESSION:    CT HEAD:  1.  Small focal acute subdural hematoma noted along the left anterior falx measuring approximately 4 mm in thickness.  2.  No evidence of mass effect or midline shift.  3. Stable chronic microvascular ischemic changes and parenchymal volume loss.    CT CERVICAL SPINE:  No acute fracture or subluxation.    Polypoid soft tissue lesion at the right tongue base has slightly decreased in size since the prior examination of10/29/2018.    < end of copied text >    < from: CT Chest w/ IV Cont (10.12.20 @ 06:52) >    IMPRESSION:No acute intrathoracic or intra-abdominal/pelvic traumatic changes.    Stable chronic T12 vertebral body mild compression fracture deformity.    T9 moderate vertebral body compression fracture of indeterminate age, new since October 2018.    < end of copied text >

## 2020-10-12 NOTE — CONSULT NOTE ADULT - ASSESSMENT
IMPRESSION: Rehab of gait dysfunction     PRECAUTIONS: [  ] Cardiac  [  ] Respiratory  [  ] Seizures [  ] Contact Isolation  [  ] Droplet Isolation  [  ] Other    Weight Bearing Status:     RECOMMENDATION:    Out of Bed to Chair     DVT/Decubiti Prophylaxis    REHAB PLAN:     [x   ] Bedside P/T 3-5 times a week   [   ]   Bedside O/T  2-3 times a week             [   ] No Rehab Therapy Indicated                   [   ]  Speech Therapy   Conditioning/ROM                                    ADL  Bed Mobility                                               Conditioning/ROM  Transfers                                                     Bed Mobility  Sitting /Standing Balance                         Transfers                                        Gait Training                                               Sitting/Standing Balance  Stair Training [   ]Applicable                    Home equipment Eval                                                                        Splinting  [   ] Only      GOALS:   ADL   [   ]   Independent                    Transfers  [ x  ] Independent                          Ambulation  [x   ] Independent     [   x ] With device                            [   ]  CG                                                         [   ]  CG                                                                  [   ] CG                            [    ] Min A                                                   [   ] Min A                                                              [   ] Min  A          DISCHARGE PLAN:   [   ]  Good candidate for Intensive Rehabilitation/Hospital based                                             Will tolerate 3hrs Intensive Rehab Daily                                       [  x  ]  Short Term Rehab in Skilled Nursing Facility                             vs          [  x  ]  Home with Outpatient or VN services                                         [    ]  Possible Candidate for Intensive Hospital based Rehab       IMPRESSION: Rehab of gait dysfunction / [facility]    PRECAUTIONS: [  ] Cardiac  [  ] Respiratory  [  ] Seizures [  ] Contact Isolation  [  ] Droplet Isolation  [  ] Other    Weight Bearing Status:     RECOMMENDATION:    Out of Bed to Chair     DVT/Decubiti Prophylaxis    REHAB PLAN:     [x   ] Bedside P/T 3-5 times a week   [   ]   Bedside O/T  2-3 times a week             [   ] No Rehab Therapy Indicated                   [   ]  Speech Therapy   Conditioning/ROM                                    ADL  Bed Mobility                                               Conditioning/ROM  Transfers                                                     Bed Mobility  Sitting /Standing Balance                         Transfers                                        Gait Training                                               Sitting/Standing Balance  Stair Training [   ]Applicable                    Home equipment Eval                                                                        Splinting  [   ] Only      GOALS:   ADL   [   ]   Independent                    Transfers  [ x  ] Independent                          Ambulation  [x   ] Independent     [   x ] With device                            [   ]  CG                                                         [   ]  CG                                                                  [   ] CG                            [    ] Min A                                                   [   ] Min A                                                              [   ] Min  A          DISCHARGE PLAN:   [   ]  Good candidate for Intensive Rehabilitation/Hospital based                                             Will tolerate 3hrs Intensive Rehab Daily                                       [  x  ]  Short Term Rehab in Skilled Nursing Facility                             vs          [  x  ]  Home with Outpatient or VN services                                         [    ]  Possible Candidate for Intensive Hospital based Rehab

## 2020-10-12 NOTE — OCCUPATIONAL THERAPY INITIAL EVALUATION ADULT - SPECIFY REASON(S)
Pt currently without activity/oob orders. Will contact MD and assess pt once orders have been received.

## 2020-10-13 ENCOUNTER — TRANSCRIPTION ENCOUNTER (OUTPATIENT)
Age: 85
End: 2020-10-13

## 2020-10-13 LAB
ANION GAP SERPL CALC-SCNC: 13 MMOL/L — SIGNIFICANT CHANGE UP (ref 7–14)
BUN SERPL-MCNC: 17 MG/DL — SIGNIFICANT CHANGE UP (ref 10–20)
CALCIUM SERPL-MCNC: 9.5 MG/DL — SIGNIFICANT CHANGE UP (ref 8.5–10.1)
CHLORIDE SERPL-SCNC: 106 MMOL/L — SIGNIFICANT CHANGE UP (ref 98–110)
CK MB CFR SERPL CALC: 4.8 NG/ML — SIGNIFICANT CHANGE UP (ref 0.6–6.3)
CK MB CFR SERPL CALC: 4.9 NG/ML — SIGNIFICANT CHANGE UP (ref 0.6–6.3)
CK SERPL-CCNC: 172 U/L — SIGNIFICANT CHANGE UP (ref 0–225)
CK SERPL-CCNC: 189 U/L — SIGNIFICANT CHANGE UP (ref 0–225)
CO2 SERPL-SCNC: 22 MMOL/L — SIGNIFICANT CHANGE UP (ref 17–32)
CREAT SERPL-MCNC: 1 MG/DL — SIGNIFICANT CHANGE UP (ref 0.7–1.5)
GLUCOSE BLDC GLUCOMTR-MCNC: 105 MG/DL — HIGH (ref 70–99)
GLUCOSE BLDC GLUCOMTR-MCNC: 92 MG/DL — SIGNIFICANT CHANGE UP (ref 70–99)
GLUCOSE BLDC GLUCOMTR-MCNC: 97 MG/DL — SIGNIFICANT CHANGE UP (ref 70–99)
GLUCOSE SERPL-MCNC: 92 MG/DL — SIGNIFICANT CHANGE UP (ref 70–99)
HCT VFR BLD CALC: 36.2 % — LOW (ref 37–47)
HGB BLD-MCNC: 11.6 G/DL — LOW (ref 12–16)
MAGNESIUM SERPL-MCNC: 2 MG/DL — SIGNIFICANT CHANGE UP (ref 1.8–2.4)
MCHC RBC-ENTMCNC: 31.2 PG — HIGH (ref 27–31)
MCHC RBC-ENTMCNC: 32 G/DL — SIGNIFICANT CHANGE UP (ref 32–37)
MCV RBC AUTO: 97.3 FL — SIGNIFICANT CHANGE UP (ref 81–99)
NRBC # BLD: 0 /100 WBCS — SIGNIFICANT CHANGE UP (ref 0–0)
PHOSPHATE SERPL-MCNC: 3.6 MG/DL — SIGNIFICANT CHANGE UP (ref 2.1–4.9)
PLATELET # BLD AUTO: 248 K/UL — SIGNIFICANT CHANGE UP (ref 130–400)
POTASSIUM SERPL-MCNC: 4.3 MMOL/L — SIGNIFICANT CHANGE UP (ref 3.5–5)
POTASSIUM SERPL-SCNC: 4.3 MMOL/L — SIGNIFICANT CHANGE UP (ref 3.5–5)
RBC # BLD: 3.72 M/UL — LOW (ref 4.2–5.4)
RBC # FLD: 11.7 % — SIGNIFICANT CHANGE UP (ref 11.5–14.5)
SARS-COV-2 RNA SPEC QL NAA+PROBE: SIGNIFICANT CHANGE UP
SODIUM SERPL-SCNC: 141 MMOL/L — SIGNIFICANT CHANGE UP (ref 135–146)
TROPONIN T SERPL-MCNC: <0.01 NG/ML — SIGNIFICANT CHANGE UP
TROPONIN T SERPL-MCNC: <0.01 NG/ML — SIGNIFICANT CHANGE UP
WBC # BLD: 6.8 K/UL — SIGNIFICANT CHANGE UP (ref 4.8–10.8)
WBC # FLD AUTO: 6.8 K/UL — SIGNIFICANT CHANGE UP (ref 4.8–10.8)

## 2020-10-13 PROCEDURE — 99291 CRITICAL CARE FIRST HOUR: CPT | Mod: 25

## 2020-10-13 PROCEDURE — 71045 X-RAY EXAM CHEST 1 VIEW: CPT | Mod: 26

## 2020-10-13 RX ORDER — PANTOPRAZOLE SODIUM 20 MG/1
40 TABLET, DELAYED RELEASE ORAL
Refills: 0 | Status: DISCONTINUED | OUTPATIENT
Start: 2020-10-13 | End: 2020-10-14

## 2020-10-13 RX ORDER — ACETAMINOPHEN 500 MG
650 TABLET ORAL EVERY 6 HOURS
Refills: 0 | Status: DISCONTINUED | OUTPATIENT
Start: 2020-10-13 | End: 2020-10-17

## 2020-10-13 RX ORDER — HEPARIN SODIUM 5000 [USP'U]/ML
5000 INJECTION INTRAVENOUS; SUBCUTANEOUS EVERY 8 HOURS
Refills: 0 | Status: DISCONTINUED | OUTPATIENT
Start: 2020-10-14 | End: 2020-10-17

## 2020-10-13 RX ORDER — SODIUM CHLORIDE 9 MG/ML
1000 INJECTION INTRAMUSCULAR; INTRAVENOUS; SUBCUTANEOUS
Refills: 0 | Status: DISCONTINUED | OUTPATIENT
Start: 2020-10-13 | End: 2020-10-13

## 2020-10-13 RX ORDER — PANTOPRAZOLE SODIUM 20 MG/1
40 TABLET, DELAYED RELEASE ORAL ONCE
Refills: 0 | Status: COMPLETED | OUTPATIENT
Start: 2020-10-13 | End: 2020-10-13

## 2020-10-13 RX ADMIN — LEVETIRACETAM 400 MILLIGRAM(S): 250 TABLET, FILM COATED ORAL at 05:41

## 2020-10-13 RX ADMIN — Medication 75 MICROGRAM(S): at 05:41

## 2020-10-13 RX ADMIN — SERTRALINE 25 MILLIGRAM(S): 25 TABLET, FILM COATED ORAL at 12:24

## 2020-10-13 RX ADMIN — LEVETIRACETAM 400 MILLIGRAM(S): 250 TABLET, FILM COATED ORAL at 17:23

## 2020-10-13 RX ADMIN — PANTOPRAZOLE SODIUM 40 MILLIGRAM(S): 20 TABLET, DELAYED RELEASE ORAL at 05:41

## 2020-10-13 RX ADMIN — Medication 1 TABLET(S): at 12:24

## 2020-10-13 RX ADMIN — AMLODIPINE BESYLATE 5 MILLIGRAM(S): 2.5 TABLET ORAL at 05:41

## 2020-10-13 NOTE — PHYSICAL THERAPY INITIAL EVALUATION ADULT - GENERAL OBSERVATIONS, REHAB EVAL
[phone] Patient encountered semi roth in bed + IV lock, + tele, +prima fit, POX, 1;1 present , NAD , receptive to PT

## 2020-10-13 NOTE — PROGRESS NOTE ADULT - SUBJECTIVE AND OBJECTIVE BOX
MARLENE BOO  [id number]  89y Female    Indication for ICU admission:  Admit Date:10/12/20  ICU Date: 10/12/20  OR Date: N/A    Allergies:   sulfonamides (Other)    PAST MEDICAL & SURGICAL HISTORY:  DVT, lower extremity  on eliquis  Hypothyroidism  Hypertension  Dementia  severe, Alzheimers  H/O bilateral hip replacements    Home Medications:  amLODIPine 5 mg oral tablet: 1 tab(s) orally once a day (07 Jul 2020 10:53)  bisacodyl 5 mg oral delayed release tablet: 1 tab(s) orally every 12 hours, As needed, Constipation (07 Jul 2020 10:53)  melatonin 5 mg oral tablet: 1 tab(s) orally once a day (at bedtime) (07 Jul 2020 10:53)  senna oral tablet: 2 tab(s) orally once a day (at bedtime) (07 Jul 2020 10:53)  sertraline 25 mg oral tablet: 1 tab(s) orally once a day (02 Jul 2020 16:32)    24HRS EVENT:  O/N: repeat CT head shows no acute changes   CE neg x 2, next @0430      DVT Prophylaxis:     GI Prophylaxis:bisacodyl 5 milliGRAM(s) Oral every 12 hours PRN  pantoprazole    Tablet 40 milliGRAM(s) Oral before breakfast  senna 2 Tablet(s) Oral at bedtime    ***Tubes/Lines/Drains  ***  Peripheral IV  Arterial Line		                  Central Line                            Urinary Catheter		Indication: Strict I&O        [X] A ten-point review of systems was otherwise negative except as noted above.  [  ] Due to altered mental status/intubation, subjective information was not attained from the patient. History was obtained, to the extent possible, from review of the chart and collateral sources of information.    Daily Height in cm: 154.94 (12 Oct 2020 04:11)    Daily     Diet, NPO:   Except Medications (10-12-20 @ 11:57)      CURRENT MEDS:  Neurologic Medications  levETIRAcetam  IVPB 250 milliGRAM(s) IV Intermittent every 12 hours  sertraline 25 milliGRAM(s) Oral daily    Respiratory Medications    Cardiovascular Medications  amLODIPine   Tablet 5 milliGRAM(s) Oral daily    Gastrointestinal Medications  bisacodyl 5 milliGRAM(s) Oral every 12 hours PRN Constipation  multivitamin 1 Tablet(s) Oral daily  pantoprazole    Tablet 40 milliGRAM(s) Oral before breakfast  senna 2 Tablet(s) Oral at bedtime  sodium chloride 0.9%. 1000 milliLiter(s) IV Continuous <Continuous>    Genitourinary Medications    Hematologic/Oncologic Medications    Antimicrobial/Immunologic Medications    Endocrine/Metabolic Medications  levothyroxine 75 MICROGram(s) Oral daily    Topical/Other Medications      ICU Vital Signs Last 24 Hrs  T(C): 37.1 (13 Oct 2020 00:00), Max: 37.1 (13 Oct 2020 00:00)  T(F): 98.7 (13 Oct 2020 00:00), Max: 98.7 (13 Oct 2020 00:00)  HR: 66 (13 Oct 2020 00:11) (60 - 98)  BP: 167/71 (13 Oct 2020 00:11) (130/100 - 183/75)  BP(mean): 102 (13 Oct 2020 00:11) (91 - 128)  ABP: --  ABP(mean): --  RR: 17 (12 Oct 2020 07:15) (17 - 20)  SpO2: 97% (13 Oct 2020 00:11) (92% - 98%)      Adult Advanced Hemodynamics Last 24 Hrs  CVP(mm Hg): --  CVP(cm H2O): --  CO: --  CI: --  PA: --  PA(mean): --  PCWP: --  SVR: --  SVRI: --  PVR: --  PVRI: --          I&O's Summary    12 Oct 2020 07:01  -  13 Oct 2020 03:38  --------------------------------------------------------  IN: 825 mL / OUT: 600 mL / NET: 225 mL      I&O's Detail    12 Oct 2020 07:01  -  13 Oct 2020 03:38  --------------------------------------------------------  IN:    Lactated Ringers: 300 mL    sodium chloride 0.9%: 525 mL  Total IN: 825 mL    OUT:    Voided (mL): 600 mL  Total OUT: 600 mL    Total NET: 225 mL          PHYSICAL EXAM:    General/Neuro  alert & oriented x 1, no focal deficits. Moves all extremities on commands.     Lungs:      clear to auscultation, Normal expansion/effort.     Cardiovascular : S1, S2.  Regular rate and rhythm.    Cardiac Rhythm: Normal Sinus Rhythm    GI: Abdomen soft, Non-tender, Non-distended.      Extremities: Extremities warm, pink, well-perfused. Pulses:Rt     Lt    Derm: Good skin turgor, no skin breakdown.      :      Voiding.       CXR:       LABS:  CAPILLARY BLOOD GLUCOSE      POCT Blood Glucose.: 97 mg/dL (13 Oct 2020 03:12)  POCT Blood Glucose.: 98 mg/dL (12 Oct 2020 20:22)  POCT Blood Glucose.: 93 mg/dL (12 Oct 2020 04:27)                          11.6   6.80  )-----------( 248      ( 13 Oct 2020 00:25 )             36.2       10-13    141  |  106  |  17  ----------------------------<  92  4.3   |  22  |  1.0    Ca    9.5      13 Oct 2020 00:25  Phos  3.6     10-13  Mg     2.0     10-13    TPro  6.4  /  Alb  3.8  /  TBili  <0.2  /  DBili  x   /  AST  34  /  ALT  12  /  AlkPhos  74  10-12      PT/INR - ( 12 Oct 2020 04:34 )   PT: 15.10 sec;   INR: 1.31 ratio         PTT - ( 12 Oct 2020 04:34 )  PTT:31.7 sec  CARDIAC MARKERS ( 13 Oct 2020 00:25 )  x     / <0.01 ng/mL / 172 U/L / x     / 4.8 ng/mL  CARDIAC MARKERS ( 12 Oct 2020 16:35 )  x     / <0.01 ng/mL / 147 U/L / x     / 6.1 ng/mL

## 2020-10-13 NOTE — PROGRESS NOTE ADULT - SUBJECTIVE AND OBJECTIVE BOX
Subjective: 89yFemale with a pmhx of SUBDURAL HEMATOMA    ^FALL    No pertinent family history in first degree relatives    Handoff    MEWS Score    DVT, lower extremity    Hypothyroidism    Hypertension    Dementia    Subdural hematoma    H/O bilateral hip replacements    No significant past surgical history    FALL    90+    Fall in home, initial encounter    Laceration of scalp, initial encounter    Ting_VstLnk    89 y.o F w/ pmhx dvt on eliquis, severe alzheimers, HTN, hypothyroid, bibems from home for fall. Pt was trying to get out of bed and fell. EMS found that pt sustained laceration to back of head. Otherwise, unable to acquire any further information from patient due to her medical condition    Pt seen and examined at bedside. Pt was a&Ox1 (baseline) and followed commands well.      Allergies    sulfonamides (Other)    Intolerances        Vital Signs Last 24 Hrs  T(C): 36.6 (13 Oct 2020 07:43), Max: 37.1 (13 Oct 2020 00:00)  T(F): 97.8 (13 Oct 2020 07:43), Max: 98.7 (13 Oct 2020 00:00)  HR: 83 (13 Oct 2020 12:24) (61 - 98)  BP: 136/63 (13 Oct 2020 12:24) (123/72 - 183/75)  BP(mean): 90 (13 Oct 2020 12:24) (84 - 128)  RR: --  SpO2: 97% (13 Oct 2020 12:24) (92% - 100%)      amLODIPine   Tablet 5 milliGRAM(s) Oral daily  bisacodyl 5 milliGRAM(s) Oral every 12 hours PRN  levETIRAcetam  IVPB 250 milliGRAM(s) IV Intermittent every 12 hours  levothyroxine 75 MICROGram(s) Oral daily  multivitamin 1 Tablet(s) Oral daily  senna 2 Tablet(s) Oral at bedtime  sertraline 25 milliGRAM(s) Oral daily        10-12-20 @ 07:01  -  10-13-20 @ 07:00  --------------------------------------------------------  IN: 1350 mL / OUT: 800 mL / NET: 550 mL    10-13-20 @ 07:01  -  10-13-20 @ 12:30  --------------------------------------------------------  IN: 150 mL / OUT: 100 mL / NET: 50 mL        REVIEW OF SYSTEMS    [ ] A ten-point review of systems was otherwise negative except as noted.  [X ] Due to altered mental status/intubation, subjective information were not able to be obtained from the patient. History was obtained, to the extent possible, from review of the chart and collateral sources of information.      Exam:  AAOX1 (to person only), Verbal function intact  PERRL, EOM's intact  Motor: MAEx4, equal b/l  Sensation to light touch grossly intact  2 cm laceration to occiput C/D/I      CBC Full  -  ( 13 Oct 2020 00:25 )  WBC Count : 6.80 K/uL  RBC Count : 3.72 M/uL  Hemoglobin : 11.6 g/dL  Hematocrit : 36.2 %  Platelet Count - Automated : 248 K/uL  Mean Cell Volume : 97.3 fL  Mean Cell Hemoglobin : 31.2 pg  Mean Cell Hemoglobin Concentration : 32.0 g/dL      10-13    141  |  106  |  17  ----------------------------<  92  4.3   |  22  |  1.0    Ca    9.5      13 Oct 2020 00:25  Phos  3.6     10-13  Mg     2.0     10-13    TPro  6.4  /  Alb  3.8  /  TBili  <0.2  /  DBili  x   /  AST  34  /  ALT  12  /  AlkPhos  74  10-12    PT/INR - ( 12 Oct 2020 04:34 )   PT: 15.10 sec;   INR: 1.31 ratio         PTT - ( 12 Oct 2020 04:34 )  PTT:31.7 sec      Imaging:    < from: CT Head No Cont (10.12.20 @ 22:43) >  IMPRESSION:    1.  Since prior study earlier the same day. No evidence of new acute intracranial hemorrhage, mass effect or midline shift.    2.  Stable focal acute subdural hematoma noted along the left anterior falx measuring approximately 4 mm in thickness.    3.  Stable chronic microvascular ischemic changes.    < end of copied text >    Assessment/Plan:   89 yo female s/p fall. CTH demonstrating 4mm acute SDH along L anterior falx   -Neuro checks q4-6h  -stable head ct repeat  -ok for hep sub q for dvt prophylaxis on wednesday  - ok for eliquis to restart in 5 days  - Keppra 250 Q12 x 7days  - can follow up as outpatient in 2 weeks   -Imaging reviewed with Dr. Brewer

## 2020-10-13 NOTE — PROGRESS NOTE ADULT - ATTENDING COMMENTS
I examined the patient with the PA/resident and discussed my plan with the Resident/PA.  I personally provided over 30 minutes of direct critical care to this patient. I agree with the above resident/pa note unless directly contradicted below.     MARLENE BOO 89 yo female s/p fall. CTH demonstrating 4mm acute SDH along L anterior falx     SDH s/p fall   -Neuro checks q4-6h  - repeat ct head stable   -ok for HSQ for dvt prophylaxis on wednesday  - ok for eliquis to restart in 5 days  - Keppra 250 Q12 x 7days    Severe dementia   - 1:1 sitter     Patient remains at baseline mental status. AAOX 1. severe dementia requiring sitter. This morning sitting up in bed eating with assistance. Downgrade to floor.

## 2020-10-13 NOTE — OCCUPATIONAL THERAPY INITIAL EVALUATION ADULT - PLANNED THERAPY INTERVENTIONS, OT EVAL
ADL retraining/stretching/strengthening/transfer training/cognitive, visual perceptual/bed mobility training

## 2020-10-13 NOTE — CHART NOTE - NSCHARTNOTEFT_GEN_A_CORE
89y demented female  HD 1 on Eliquis  s/p fall from bed  w/trauma workup positive for small SDH with stable CTH x2,  Eliquis reversed, Keppra initiated and patient upgraded to SICU for q1 neuro checks. Patients remains HD/neuro intact and was cleared by NSGY for downgrade     PMH  PAST MEDICAL & SURGICAL HISTORY:  DVT, lower extremity, on Eliquis  Hypothyroidism  Hypertension  Dementia  severe, Alzheimer's dementia   H/O bilateral hip replacements    Home Meds:  Home Medications:  amLODIPine 5 mg oral tablet: 1 tab(s) orally once a day (2020 10:53)  bisacodyl 5 mg oral delayed release tablet: 1 tab(s) orally every 12 hours, As needed, Constipation (2020 10:53)  melatonin 5 mg oral tablet: 1 tab(s) orally once a day (at bedtime) (2020 10:53)  senna oral tablet: 2 tab(s) orally once a day (at bedtime) (2020 10:53)  sertraline 25 mg oral tablet: 1 tab(s) orally once a day (2020 16:32)    Allergies  sulfonamides (Other)     Neurologic:  TBI: SDH, stable on cTH x2  - q4 neuro checks ( patient aox1, intermittently follows commands, moves all extremities)  - Keppra 250 q12 x7 days   - HOB 45  - Mag >2    Respiratory:  -     Cardiovascular:    Gastrointestinal/Nutrition:    Genitourinary/Renal:    Hematologic:    Infectious Disease:    Endocrine:    Disposition:      w/PMHx of Alzheimer's dementia (A&Ox1), HTN, hypothyroidism, DVT in 2020 (on Eliquis) seen as a trauma alert s/p fall from bed onto floor, +HT, -LOC, +AC (on Eliquis).  Per EMS, patient frequently falls and she was attempting to get out of bed when she fell this time. Patient is very demented at baseline and no further history was able to be obtained. In the ED she is afebrile, /77, HR 67. She is saturating 97%. On exam she is A&Ox1 (baseline). She does not easily follow commands, has +ROM in upper and lower extremities. She has no leukocytosis on labs, her Hgb is 11.0. Potassium significant for 5.9, hemolyzed. Pending repeat. Imaging demonstrated the followin.  Small focal acute subdural hematoma noted along the left anterior falx measuring approximately 4 mm in thickness    Patient was seen by Neurosurgery for CTH findings, Eliquis held and reversed. Recommending repeat CTH tomorrow AM, initiating Keppra 250 Q12 for 7 days, and Q1 hour neurologic checks. 89y demented female  HD 1 on Eliquis  s/p fall from bed  w/trauma workup positive for small SDH with stable CTH x2,  Eliquis reversed, Keppra initiated and patient upgraded to SICU for q1 neuro checks. Patients remains HD/neuro intact and was cleared by NSGY for downgrade     PMH  PAST MEDICAL & SURGICAL HISTORY:  DVT, lower extremity, on Eliquis  Hypothyroidism  Hypertension  Dementia  severe, Alzheimer's dementia   H/O bilateral hip replacements    Home Meds:  Home Medications:  amLODIPine 5 mg oral tablet: 1 tab(s) orally once a day (07 Jul 2020 10:53)  bisacodyl 5 mg oral delayed release tablet: 1 tab(s) orally every 12 hours, As needed, Constipation (07 Jul 2020 10:53)  melatonin 5 mg oral tablet: 1 tab(s) orally once a day (at bedtime) (07 Jul 2020 10:53)  senna oral tablet: 2 tab(s) orally once a day (at bedtime) (07 Jul 2020 10:53)  sertraline 25 mg oral tablet: 1 tab(s) orally once a day (02 Jul 2020 16:32)    Allergies  sulfonamides (Other)     Neurologic:  TBI: SDH, stable on cTH x2  - q4 neuro checks ( patient aox1, intermittently follows commands, moves all extremities)  - Keppra 250 q12 x7 days   - HOB 45  - Mag >2    Depression:  - Home Zoloft    Pain:  - Tylenol prn     Respiratory:  - No acute issues  - Tolerating RA, maintain 02 sat >94%  - Is/PT/OOB    Cardiovascular:  HTN:  - Home amlodipine restarted  - Maintain MAP >65   -  Echo (3/10/20): EF of 51%, moderate mitral valve prolapse, moderate mitral valve regurgitation.    Gastrointestinal/Nutrition:  Diet:  - DASH    Prophylaxis:  - PPI  - Senna  - Dulcolax     Genitourinary/Renal:  - No acute issues  - Voiding  - Monitor lytes and replete     Hematologic:  Prophylaxis:  - Hep Sq to start on 10/14, Eliquis to start on 10/17 per NSGY  - SCD     - H/H stable    Infectious Disease:  - Afebrile, no evidence of infection     Endocrine:  - No acute issues   - Monitor glucose on BMP, ISS if needed , maintain -180     Disposition: 89y demented female  HD 1 on Eliquis  s/p fall from bed  w/trauma workup positive for small SDH with stable CTH x2,  Eliquis reversed, Keppra initiated and patient upgraded to SICU for q1 neuro checks. Patients remains HD/neuro intact and was cleared by NSGY for downgrade     PMH  PAST MEDICAL & SURGICAL HISTORY:  DVT, lower extremity, on Eliquis  Hypothyroidism  Hypertension  Dementia  severe, Alzheimer's dementia   H/O bilateral hip replacements    Home Meds:  Home Medications:  amLODIPine 5 mg oral tablet: 1 tab(s) orally once a day (07 Jul 2020 10:53)  bisacodyl 5 mg oral delayed release tablet: 1 tab(s) orally every 12 hours, As needed, Constipation (07 Jul 2020 10:53)  melatonin 5 mg oral tablet: 1 tab(s) orally once a day (at bedtime) (07 Jul 2020 10:53)  senna oral tablet: 2 tab(s) orally once a day (at bedtime) (07 Jul 2020 10:53)  sertraline 25 mg oral tablet: 1 tab(s) orally once a day (02 Jul 2020 16:32)    Allergies  sulfonamides (Other)     Neurologic:  TBI: SDH, stable on cTH x2  - q4 neuro checks ( patient aox1, intermittently follows commands, moves all extremities)  - Keppra 250 q12 x7 days   - HOB 45  - Mag >2    Depression:  - Home Zoloft    Pain:  - Tylenol prn     Respiratory:  - No acute issues  - Tolerating RA, maintain 02 sat >94%  - IS/PT/OOB    Cardiovascular:  HTN:  - Home amlodipine restarted  - Maintain MAP >65   -  Echo (3/10/20): EF of 51%, moderate mitral valve prolapse, moderate mitral valve regurgitation.    Gastrointestinal/Nutrition:  Diet:  - DASH    Prophylaxis:  - PPI  - Senna  - Dulcolax     Genitourinary/Renal:  - No acute issues  - Voiding  - Monitor lytes and replete   - F/u UA     Hematologic:  Prophylaxis:  - Hep Sq to start on 10/14, Eliquis to start on 10/17 per NSGY  - SCD     - H/H stable    Infectious Disease:  - Afebrile, no evidence of infection     Endocrine:  - No acute issues   - Monitor glucose on BMP, ISS if needed , maintain -180     Disposition: 89y demented female  HD 1 on Eliquis  s/p fall from bed  w/trauma workup positive for small SDH with stable CTH x2,  Eliquis reversed, Keppra initiated and patient upgraded to SICU for q1 neuro checks. Patients remains HD/neuro intact and was cleared by NSGY for downgrade     PMH  PAST MEDICAL & SURGICAL HISTORY:  DVT, lower extremity, on Eliquis  Hypothyroidism  Hypertension  Dementia  severe, Alzheimer's dementia   H/O bilateral hip replacements    Home Meds:  Home Medications:  amLODIPine 5 mg oral tablet: 1 tab(s) orally once a day (07 Jul 2020 10:53)  bisacodyl 5 mg oral delayed release tablet: 1 tab(s) orally every 12 hours, As needed, Constipation (07 Jul 2020 10:53)  melatonin 5 mg oral tablet: 1 tab(s) orally once a day (at bedtime) (07 Jul 2020 10:53)  senna oral tablet: 2 tab(s) orally once a day (at bedtime) (07 Jul 2020 10:53)  sertraline 25 mg oral tablet: 1 tab(s) orally once a day (02 Jul 2020 16:32)    Allergies  sulfonamides (Other)     Neurologic:  TBI: SDH, stable on CTH x2  - q4 neuro checks ( patient aox1, intermittently follows commands, moves all extremities)  - Keppra 250 q12 x7 days   - HOB 45  - Mag >2    Depression/Insomnia:  - Home Zoloft/Melatonin     Pain:  - Tylenol prn     Respiratory:  - No acute issues  - Tolerating RA, maintain 02 sat >94%  - IS/PT/OOB    Cardiovascular:  HTN:  - Home amlodipine restarted  - Maintain MAP >65   -  Echo (3/10/20): EF of 51%, moderate mitral valve prolapse, moderate mitral valve regurgitation.    Gastrointestinal/Nutrition:  Diet:  - DASH    Prophylaxis:  - PPI  - Senna  - Dulcolax     Genitourinary/Renal:  - No acute issues  - Voiding  - Monitor lytes and replete   - F/u UA     Hematologic:  Prophylaxis:  - Hep Sq to start on 10/14, Eliquis to start on 10/17 per NSGY  - SCD     - H/H stable    Infectious Disease:  - Afebrile, no evidence of infection     Endocrine:  - No acute issues   - Monitor glucose on BMP, ISS if needed , maintain -180     Disposition: Signed out to: 89y demented female  HD 1 on Eliquis  s/p fall from bed  w/trauma workup positive for small SDH with stable CTH x2,  Eliquis reversed, Keppra initiated and patient upgraded to SICU for q1 neuro checks. Patients remains HD/neuro intact and was cleared by NSGY for downgrade     PMH  PAST MEDICAL & SURGICAL HISTORY:  DVT, lower extremity, on Eliquis  Hypothyroidism  Hypertension  Dementia  severe, Alzheimer's dementia   H/O bilateral hip replacements    Home Meds:  Home Medications:  amLODIPine 5 mg oral tablet: 1 tab(s) orally once a day (07 Jul 2020 10:53)  bisacodyl 5 mg oral delayed release tablet: 1 tab(s) orally every 12 hours, As needed, Constipation (07 Jul 2020 10:53)  melatonin 5 mg oral tablet: 1 tab(s) orally once a day (at bedtime) (07 Jul 2020 10:53)  senna oral tablet: 2 tab(s) orally once a day (at bedtime) (07 Jul 2020 10:53)  sertraline 25 mg oral tablet: 1 tab(s) orally once a day (02 Jul 2020 16:32)    Allergies  sulfonamides (Other)     Neurologic:  TBI: SDH, stable on CTH x2  - q4 neuro checks ( patient aox1, intermittently follows commands, moves all extremities)  - Keppra 250 q12 x7 days   - HOB 45  - Mag >2    Depression/Insomnia:  - Home Zoloft/Melatonin     Pain:  - Tylenol prn     Respiratory:  - No acute issues  - Tolerating RA, maintain 02 sat >94%  - IS/PT/OOB    Cardiovascular:  HTN:  - Home amlodipine restarted  - Maintain MAP >65   -  Echo (3/10/20): EF of 51%, moderate mitral valve prolapse, moderate mitral valve regurgitation.    Gastrointestinal/Nutrition:  Diet:  - DASH    Prophylaxis:  - PPI  - Senna  - Dulcolax     Genitourinary/Renal:  - No acute issues  - Voiding  - Monitor lytes and replete   - F/u UA     Hematologic:  Prophylaxis:  - Hep Sq to start on 10/14, Eliquis to start on 10/17 per NSGY  - SCD     - H/H stable    Infectious Disease:  - Afebrile, no evidence of infection     Endocrine:  - No acute issues   - Monitor glucose on BMP, ISS if needed , maintain -180     Disposition: Signed out to: Dr. Ortega @ 16:35

## 2020-10-13 NOTE — PROGRESS NOTE ADULT - ASSESSMENT
89F s/p fall presents with SDH.       NEURO:  A&Ox1   No focal neurologic deficits noted   CTH: small acute SDH along the left anterior falx (4mm), repeat CT in evening shows no changes  Eliquis reversed with Kcentra   Keppra 250mg Q12 hours for 7 days   Q1 hour neurologic checks     RESP:  Saturating (97% - 98%) on RA   CXR: No radiographic evidence of acute cardiopulmonary disease  No known chronic diagnoses   AM CXR     CARDS:  HR: 67 (60 - 67)  BP: 166/77 (145/64 - 166/77)  EKG: Pending   Echo (3/10/20): EF of 51%, moderate mitral valve prolapse, moderate mitral valve regurgitation.  Trend cardiac enzymes, neg x 2  Hx of HTN  -Continue home amlodipine 5mg QD     GI/NUTR:  NPO except medications   LR @75   GI ppx: PTX   Bowel regimen: senna     /RENAL:  Voiding   Na 141, K 4.3, Mag 2.0, Phos 3.6  Replete lytes PRN   Strict I&Os     HEME/ONC:  Hgb 11.6  No anticoagulation due to SDH, holding home Eliquis   Daily CBC     ID:  Afebrile   WBC: 6.8   No evidence of infection  No antibiotics   Continue to monitor for fever   Daily CBC     ENDO:  FS: 97  No known history of DM   FS Q6 hrs     LINES/DRAINS: PIV

## 2020-10-13 NOTE — PHYSICAL THERAPY INITIAL EVALUATION ADULT - GAIT TRAINING, PT EVAL
Patient will amb with rolling walker 200' require close supervision with rolling walker by discharge

## 2020-10-14 LAB
ANION GAP SERPL CALC-SCNC: 11 MMOL/L — SIGNIFICANT CHANGE UP (ref 7–14)
BUN SERPL-MCNC: 18 MG/DL — SIGNIFICANT CHANGE UP (ref 10–20)
CALCIUM SERPL-MCNC: 9 MG/DL — SIGNIFICANT CHANGE UP (ref 8.5–10.1)
CHLORIDE SERPL-SCNC: 104 MMOL/L — SIGNIFICANT CHANGE UP (ref 98–110)
CO2 SERPL-SCNC: 24 MMOL/L — SIGNIFICANT CHANGE UP (ref 17–32)
CREAT SERPL-MCNC: 1.2 MG/DL — SIGNIFICANT CHANGE UP (ref 0.7–1.5)
GLUCOSE BLDC GLUCOMTR-MCNC: 101 MG/DL — HIGH (ref 70–99)
GLUCOSE BLDC GLUCOMTR-MCNC: 104 MG/DL — HIGH (ref 70–99)
GLUCOSE BLDC GLUCOMTR-MCNC: 105 MG/DL — HIGH (ref 70–99)
GLUCOSE BLDC GLUCOMTR-MCNC: 115 MG/DL — HIGH (ref 70–99)
GLUCOSE BLDC GLUCOMTR-MCNC: 151 MG/DL — HIGH (ref 70–99)
GLUCOSE SERPL-MCNC: 100 MG/DL — HIGH (ref 70–99)
HCT VFR BLD CALC: 35.3 % — LOW (ref 37–47)
HGB BLD-MCNC: 11.3 G/DL — LOW (ref 12–16)
MAGNESIUM SERPL-MCNC: 1.9 MG/DL — SIGNIFICANT CHANGE UP (ref 1.8–2.4)
MCHC RBC-ENTMCNC: 30.1 PG — SIGNIFICANT CHANGE UP (ref 27–31)
MCHC RBC-ENTMCNC: 32 G/DL — SIGNIFICANT CHANGE UP (ref 32–37)
MCV RBC AUTO: 94.1 FL — SIGNIFICANT CHANGE UP (ref 81–99)
NRBC # BLD: 0 /100 WBCS — SIGNIFICANT CHANGE UP (ref 0–0)
PHOSPHATE SERPL-MCNC: 3.8 MG/DL — SIGNIFICANT CHANGE UP (ref 2.1–4.9)
PLATELET # BLD AUTO: 278 K/UL — SIGNIFICANT CHANGE UP (ref 130–400)
POTASSIUM SERPL-MCNC: 4.2 MMOL/L — SIGNIFICANT CHANGE UP (ref 3.5–5)
POTASSIUM SERPL-SCNC: 4.2 MMOL/L — SIGNIFICANT CHANGE UP (ref 3.5–5)
RBC # BLD: 3.75 M/UL — LOW (ref 4.2–5.4)
RBC # FLD: 11.8 % — SIGNIFICANT CHANGE UP (ref 11.5–14.5)
SODIUM SERPL-SCNC: 139 MMOL/L — SIGNIFICANT CHANGE UP (ref 135–146)
WBC # BLD: 8.99 K/UL — SIGNIFICANT CHANGE UP (ref 4.8–10.8)
WBC # FLD AUTO: 8.99 K/UL — SIGNIFICANT CHANGE UP (ref 4.8–10.8)

## 2020-10-14 PROCEDURE — 99233 SBSQ HOSP IP/OBS HIGH 50: CPT

## 2020-10-14 PROCEDURE — 71045 X-RAY EXAM CHEST 1 VIEW: CPT | Mod: 26

## 2020-10-14 RX ORDER — SODIUM CHLORIDE 9 MG/ML
1000 INJECTION, SOLUTION INTRAVENOUS
Refills: 0 | Status: DISCONTINUED | OUTPATIENT
Start: 2020-10-14 | End: 2020-10-17

## 2020-10-14 RX ORDER — MAGNESIUM SULFATE 500 MG/ML
2 VIAL (ML) INJECTION ONCE
Refills: 0 | Status: COMPLETED | OUTPATIENT
Start: 2020-10-14 | End: 2020-10-14

## 2020-10-14 RX ORDER — PANTOPRAZOLE SODIUM 20 MG/1
40 TABLET, DELAYED RELEASE ORAL DAILY
Refills: 0 | Status: DISCONTINUED | OUTPATIENT
Start: 2020-10-14 | End: 2020-10-17

## 2020-10-14 RX ADMIN — HEPARIN SODIUM 5000 UNIT(S): 5000 INJECTION INTRAVENOUS; SUBCUTANEOUS at 21:06

## 2020-10-14 RX ADMIN — SERTRALINE 25 MILLIGRAM(S): 25 TABLET, FILM COATED ORAL at 12:48

## 2020-10-14 RX ADMIN — Medication 75 MICROGRAM(S): at 05:58

## 2020-10-14 RX ADMIN — SENNA PLUS 2 TABLET(S): 8.6 TABLET ORAL at 21:06

## 2020-10-14 RX ADMIN — PANTOPRAZOLE SODIUM 40 MILLIGRAM(S): 20 TABLET, DELAYED RELEASE ORAL at 12:49

## 2020-10-14 RX ADMIN — SODIUM CHLORIDE 75 MILLILITER(S): 9 INJECTION, SOLUTION INTRAVENOUS at 10:11

## 2020-10-14 RX ADMIN — HEPARIN SODIUM 5000 UNIT(S): 5000 INJECTION INTRAVENOUS; SUBCUTANEOUS at 15:27

## 2020-10-14 RX ADMIN — AMLODIPINE BESYLATE 5 MILLIGRAM(S): 2.5 TABLET ORAL at 05:58

## 2020-10-14 RX ADMIN — LEVETIRACETAM 400 MILLIGRAM(S): 250 TABLET, FILM COATED ORAL at 05:02

## 2020-10-14 RX ADMIN — Medication 1 TABLET(S): at 12:48

## 2020-10-14 RX ADMIN — HEPARIN SODIUM 5000 UNIT(S): 5000 INJECTION INTRAVENOUS; SUBCUTANEOUS at 05:58

## 2020-10-14 RX ADMIN — Medication 25 GRAM(S): at 03:28

## 2020-10-14 RX ADMIN — LEVETIRACETAM 400 MILLIGRAM(S): 250 TABLET, FILM COATED ORAL at 17:53

## 2020-10-14 NOTE — PROGRESS NOTE ADULT - ASSESSMENT
Assessment / Plan:    NEURO:  Hx of Alzheimer's dementia - A&Ox1 (person)  Acute agitation - No longer requiring 1:1 sit; wearing soft vest restraint  Hx of depression - on home Zoloft  No focal neurologic deficits noted   CTH: small acute SDH along the left anterior falx (4mm), repeat CT stable  Eliquis reversed with Kcentra in ED  Neurosurgery c/s: Keppra 250mg Q12 hours x 7 days   Q4 hour neurologic checks     RESP:  On RA, satting well  CXR: No radiographic evidence of acute cardiopulmonary disease  No known chronic diagnoses     CARDS:  Hx of HTN  -Continue home amlodipine 5mg QD   Echo (3/10/20): EF of 51%, moderate mitral valve prolapse, moderate mitral valve regurgitation.  Cardiac enzymes, neg x 3    GI/NUTR:  Diet: DASH  IVL  GI ppx: PTX   Bowel regimen: senna     /RENAL:  Voiding - monitor UO  Mild SHARON, stable - BUN/Cr 18/1.2  Hypomagnesemic - repleted  Monitor & replete lytes PRN     HEME/ONC:  Hgb 11  Hx of DVTs  DVT ppx: started on HSQ, holding home Eliquis for 5 days      ID:  Afebrile   WBC: 9  No evidence of infection  No antibiotics     ENDO:  No known history of DM       LINES/DRAINS: PIV    DISPO: Downgrade

## 2020-10-14 NOTE — DIETITIAN INITIAL EVALUATION ADULT. - ENERGY NEEDS
Calories: 788-946 kcal/day (MSJ x 1-1.2 AF) advanced age considered  Protein: 41-50 gm/day (0.9-1.1 gm/kg CBW) same as above  Fluid: 1 ml/kcal

## 2020-10-14 NOTE — DIETITIAN INITIAL EVALUATION ADULT. - FEEDING SKILL
Per RN, pt ate all of her scrambled eggs and drank all of the juice for breakfast this morning. States that pt has a good appetite, just needs assistance./total assistance

## 2020-10-14 NOTE — DIETITIAN INITIAL EVALUATION ADULT. - NAME AND PHONE
Pt to maintain 50-75% po intake of meals and snacks over the next 7 days. RD to monitor energy intake, body composition, NFPF

## 2020-10-14 NOTE — PROGRESS NOTE ADULT - ATTENDING COMMENTS
I examined the patient with the PA/resident and discussed my plan with the Resident/PA.  I agree with the above resident/pa note unless directly contradicted below.     MARLENE [id number] SDH s/p fall    Downgrade from SICU yesterday a/w floor bed. No acute events overnight. Baseline mental status ( hx severe dementia) I examined the patient with the PA/resident and discussed my plan with the Resident/PA.  I agree with the above resident/pa note unless directly contradicted below.     MARLENE [id number] SDH s/p fall    Downgrade from SICU yesterday a/w floor bed. No acute events overnight. Baseline mental status ( hx severe dementia). HSQ dvt ppx started today per neurosx. D5 1/2NS gentle hydration poor po intake.

## 2020-10-14 NOTE — PROGRESS NOTE ADULT - SUBJECTIVE AND OBJECTIVE BOX
MARLENE BOO  [id number]  89y Female    Indication for ICU admission: q1h neurochecks s/p fall with acute SDH  Admit Date:10/12/20  ICU Date: 10/12/20  OR Date: N/A    Allergies:   sulfonamides (Other)    PAST MEDICAL & SURGICAL HISTORY:  DVT, lower extremity  on eliquis  Hypothyroidism  Hypertension  Dementia  severe, Alzheimers  H/O bilateral hip replacements    Home Medications:  amLODIPine 5 mg oral tablet: 1 tab(s) orally once a day (07 Jul 2020 10:53)  bisacodyl 5 mg oral delayed release tablet: 1 tab(s) orally every 12 hours, As needed, Constipation (07 Jul 2020 10:53)  melatonin 5 mg oral tablet: 1 tab(s) orally once a day (at bedtime) (07 Jul 2020 10:53)  senna oral tablet: 2 tab(s) orally once a day (at bedtime) (07 Jul 2020 10:53)  sertraline 25 mg oral tablet: 1 tab(s) orally once a day (02 Jul 2020 16:32)    24HRS EVENT: No acute events. Downgraded, waiting for bed on the floor.    Overnight: No acute events.      DVT Prophylaxis: HSQ    GI Prophylaxis:bisacodyl 5 milliGRAM(s) Oral every 12 hours PRN  pantoprazole    Tablet 40 milliGRAM(s) Oral before breakfast  senna 2 Tablet(s) Oral at bedtime    ***Tubes/Lines/Drains  ***  Peripheral IV      [X] A ten-point review of systems was otherwise negative except as noted above.  [  ] Due to altered mental status/intubation, subjective information was not attained from the patient. History was obtained, to the extent possible, from review of the chart and collateral sources of information.   MARLENE BOO  [id number]  89y Female    Indication for ICU admission: q1h neurochecks s/p fall with acute SDH  Admit Date:10/12/20  ICU Date: 10/12/20  OR Date: N/A    Allergies:   sulfonamides (Other)    PAST MEDICAL & SURGICAL HISTORY:  DVT, lower extremity  on eliquis  Hypothyroidism  Hypertension  Dementia  severe, Alzheimers  H/O bilateral hip replacements    Home Medications:  amLODIPine 5 mg oral tablet: 1 tab(s) orally once a day (2020 10:53)  bisacodyl 5 mg oral delayed release tablet: 1 tab(s) orally every 12 hours, As needed, Constipation (2020 10:53)  melatonin 5 mg oral tablet: 1 tab(s) orally once a day (at bedtime) (2020 10:53)  senna oral tablet: 2 tab(s) orally once a day (at bedtime) (2020 10:53)  sertraline 25 mg oral tablet: 1 tab(s) orally once a day (2020 16:32)    24HRS EVENT: No acute events. Downgraded, waiting for bed on the floor.    Overnight: No acute events.      DVT Prophylaxis: HSQ    GI Prophylaxis:bisacodyl 5 milliGRAM(s) Oral every 12 hours PRN  pantoprazole    Tablet 40 milliGRAM(s) Oral before breakfast  senna 2 Tablet(s) Oral at bedtime    ***Tubes/Lines/Drains  ***  Peripheral IV      [X] A ten-point review of systems was otherwise negative except as noted above.  [  ] Due to altered mental status/intubation, subjective information was not attained from the patient. History was obtained, to the extent possible, from review of the chart and collateral sources of information.    Daily     Daily Weight in k (14 Oct 2020 06:00)    Diet, DASH/TLC:   Sodium & Cholesterol Restricted (10-13-20 @ 11:01)      CURRENT MEDS:  Neurologic Medications  acetaminophen   Tablet .. 650 milliGRAM(s) Oral every 6 hours PRN Mild Pain (1 - 3), Moderate Pain (4 - 6), Severe Pain (7 - 10)  levETIRAcetam  IVPB 250 milliGRAM(s) IV Intermittent every 12 hours  sertraline 25 milliGRAM(s) Oral daily    Respiratory Medications    Cardiovascular Medications  amLODIPine   Tablet 5 milliGRAM(s) Oral daily    Gastrointestinal Medications  bisacodyl 5 milliGRAM(s) Oral every 12 hours PRN Constipation  multivitamin 1 Tablet(s) Oral daily  pantoprazole   Suspension 40 milliGRAM(s) Oral daily  senna 2 Tablet(s) Oral at bedtime    Genitourinary Medications    Hematologic/Oncologic Medications  heparin   Injectable 5000 Unit(s) SubCutaneous every 8 hours    Antimicrobial/Immunologic Medications    Endocrine/Metabolic Medications  levothyroxine 75 MICROGram(s) Oral daily    Topical/Other Medications      ICU Vital Signs Last 24 Hrs  T(C): 36.2 (14 Oct 2020 07:54), Max: 36.9 (14 Oct 2020 04:00)  T(F): 97.2 (14 Oct 2020 07:54), Max: 98.5 (14 Oct 2020 04:00)  HR: 82 (14 Oct 2020 06:00) (62 - 98)  BP: 103/52 (14 Oct 2020 06:00) (101/58 - 173/88)  BP(mean): 72 (14 Oct 2020 06:00) (58 - 119)  RR: 16 (14 Oct 2020 06:00) (16 - 18)  SpO2: 94% (14 Oct 2020 06:00) (94% - 100%)      I&O's Summary    13 Oct 2020 07:01  -  14 Oct 2020 07:00  --------------------------------------------------------  IN: 250 mL / OUT: 100 mL / NET: 150 mL      I&O's Detail    13 Oct 2020 07:  -  14 Oct 2020 07:00  --------------------------------------------------------  IN:    IV PiggyBack: 100 mL    sodium chloride 0.9%: 150 mL  Total IN: 250 mL    OUT:    Voided (mL): 100 mL  Total OUT: 100 mL    Total NET: 150 mL          PHYSICAL EXAM:    General/Neuro  alert & oriented x 1, Moves all extremities on commands.     Lungs:      clear to auscultation, Normal expansion/effort.     Cardiovascular : S1, S2.  Regular rate and rhythm.    Cardiac Rhythm: Normal Sinus Rhythm    GI: Abdomen soft, Non-tender, Non-distended.      Extremities: Extremities warm, pink, well-perfused.     Derm: Good skin turgor, no skin breakdown.      :      Voiding.     CXR:       LABS:  CAPILLARY BLOOD GLUCOSE      POCT Blood Glucose.: 105 mg/dL (14 Oct 2020 06:03)  POCT Blood Glucose.: 104 mg/dL (14 Oct 2020 00:04)  POCT Blood Glucose.: 105 mg/dL (13 Oct 2020 17:30)  POCT Blood Glucose.: 92 mg/dL (13 Oct 2020 11:12)                          11.3   8.99  )-----------( 278      ( 14 Oct 2020 00:20 )             35.3       10-14    139  |  104  |  18  ----------------------------<  100<H>  4.2   |  24  |  1.2    Ca    9.0      14 Oct 2020 00:20  Phos  3.8     10-14  Mg     1.9     10-14          CARDIAC MARKERS ( 13 Oct 2020 05:34 )  x     / <0.01 ng/mL / 189 U/L / x     / 4.9 ng/mL  CARDIAC MARKERS ( 13 Oct 2020 00:25 )  x     / <0.01 ng/mL / 172 U/L / x     / 4.8 ng/mL  CARDIAC MARKERS ( 12 Oct 2020 16:35 )  x     / <0.01 ng/mL / 147 U/L / x     / 6.1 ng/mL

## 2020-10-14 NOTE — DIETITIAN INITIAL EVALUATION ADULT. - OTHER INFO
Nutrition Hx PTA: Unable to obtain at this time as emergency contact unreachable via phone.    Pt presents s/p fall; CTH demonstrating 4mm acute SDH along L anterior falx. Repeat CT stable; Q4 hour neurologic checks.

## 2020-10-15 LAB
ANION GAP SERPL CALC-SCNC: 11 MMOL/L — SIGNIFICANT CHANGE UP (ref 7–14)
ANION GAP SERPL CALC-SCNC: 7 MMOL/L — SIGNIFICANT CHANGE UP (ref 7–14)
BASOPHILS # BLD AUTO: 0.05 K/UL — SIGNIFICANT CHANGE UP (ref 0–0.2)
BASOPHILS NFR BLD AUTO: 0.7 % — SIGNIFICANT CHANGE UP (ref 0–1)
BUN SERPL-MCNC: 21 MG/DL — HIGH (ref 10–20)
BUN SERPL-MCNC: 26 MG/DL — HIGH (ref 10–20)
CALCIUM SERPL-MCNC: 8.5 MG/DL — SIGNIFICANT CHANGE UP (ref 8.5–10.1)
CALCIUM SERPL-MCNC: 8.6 MG/DL — SIGNIFICANT CHANGE UP (ref 8.5–10.1)
CHLORIDE SERPL-SCNC: 110 MMOL/L — SIGNIFICANT CHANGE UP (ref 98–110)
CHLORIDE SERPL-SCNC: 110 MMOL/L — SIGNIFICANT CHANGE UP (ref 98–110)
CO2 SERPL-SCNC: 23 MMOL/L — SIGNIFICANT CHANGE UP (ref 17–32)
CO2 SERPL-SCNC: 24 MMOL/L — SIGNIFICANT CHANGE UP (ref 17–32)
CREAT SERPL-MCNC: 1.3 MG/DL — SIGNIFICANT CHANGE UP (ref 0.7–1.5)
CREAT SERPL-MCNC: 1.4 MG/DL — SIGNIFICANT CHANGE UP (ref 0.7–1.5)
EOSINOPHIL # BLD AUTO: 0.29 K/UL — SIGNIFICANT CHANGE UP (ref 0–0.7)
EOSINOPHIL NFR BLD AUTO: 4 % — SIGNIFICANT CHANGE UP (ref 0–8)
GLUCOSE BLDC GLUCOMTR-MCNC: 115 MG/DL — HIGH (ref 70–99)
GLUCOSE BLDC GLUCOMTR-MCNC: 118 MG/DL — HIGH (ref 70–99)
GLUCOSE BLDC GLUCOMTR-MCNC: 145 MG/DL — HIGH (ref 70–99)
GLUCOSE SERPL-MCNC: 106 MG/DL — HIGH (ref 70–99)
GLUCOSE SERPL-MCNC: 115 MG/DL — HIGH (ref 70–99)
HCT VFR BLD CALC: 32.7 % — LOW (ref 37–47)
HCT VFR BLD CALC: 33 % — LOW (ref 37–47)
HGB BLD-MCNC: 10.3 G/DL — LOW (ref 12–16)
HGB BLD-MCNC: 10.4 G/DL — LOW (ref 12–16)
IMM GRANULOCYTES NFR BLD AUTO: 0.3 % — SIGNIFICANT CHANGE UP (ref 0.1–0.3)
LYMPHOCYTES # BLD AUTO: 1.29 K/UL — SIGNIFICANT CHANGE UP (ref 1.2–3.4)
LYMPHOCYTES # BLD AUTO: 17.8 % — LOW (ref 20.5–51.1)
MAGNESIUM SERPL-MCNC: 1.9 MG/DL — SIGNIFICANT CHANGE UP (ref 1.8–2.4)
MAGNESIUM SERPL-MCNC: 2.1 MG/DL — SIGNIFICANT CHANGE UP (ref 1.8–2.4)
MCHC RBC-ENTMCNC: 30.1 PG — SIGNIFICANT CHANGE UP (ref 27–31)
MCHC RBC-ENTMCNC: 30.1 PG — SIGNIFICANT CHANGE UP (ref 27–31)
MCHC RBC-ENTMCNC: 31.5 G/DL — LOW (ref 32–37)
MCHC RBC-ENTMCNC: 31.5 G/DL — LOW (ref 32–37)
MCV RBC AUTO: 95.4 FL — SIGNIFICANT CHANGE UP (ref 81–99)
MCV RBC AUTO: 95.6 FL — SIGNIFICANT CHANGE UP (ref 81–99)
MONOCYTES # BLD AUTO: 0.72 K/UL — HIGH (ref 0.1–0.6)
MONOCYTES NFR BLD AUTO: 9.9 % — HIGH (ref 1.7–9.3)
NEUTROPHILS # BLD AUTO: 4.88 K/UL — SIGNIFICANT CHANGE UP (ref 1.4–6.5)
NEUTROPHILS NFR BLD AUTO: 67.3 % — SIGNIFICANT CHANGE UP (ref 42.2–75.2)
NRBC # BLD: 0 /100 WBCS — SIGNIFICANT CHANGE UP (ref 0–0)
NRBC # BLD: 0 /100 WBCS — SIGNIFICANT CHANGE UP (ref 0–0)
PHOSPHATE SERPL-MCNC: 3.2 MG/DL — SIGNIFICANT CHANGE UP (ref 2.1–4.9)
PHOSPHATE SERPL-MCNC: 3.6 MG/DL — SIGNIFICANT CHANGE UP (ref 2.1–4.9)
PLATELET # BLD AUTO: 249 K/UL — SIGNIFICANT CHANGE UP (ref 130–400)
PLATELET # BLD AUTO: 253 K/UL — SIGNIFICANT CHANGE UP (ref 130–400)
POTASSIUM SERPL-MCNC: 4.1 MMOL/L — SIGNIFICANT CHANGE UP (ref 3.5–5)
POTASSIUM SERPL-MCNC: 4.6 MMOL/L — SIGNIFICANT CHANGE UP (ref 3.5–5)
POTASSIUM SERPL-SCNC: 4.1 MMOL/L — SIGNIFICANT CHANGE UP (ref 3.5–5)
POTASSIUM SERPL-SCNC: 4.6 MMOL/L — SIGNIFICANT CHANGE UP (ref 3.5–5)
RBC # BLD: 3.42 M/UL — LOW (ref 4.2–5.4)
RBC # BLD: 3.46 M/UL — LOW (ref 4.2–5.4)
RBC # FLD: 11.8 % — SIGNIFICANT CHANGE UP (ref 11.5–14.5)
RBC # FLD: 11.8 % — SIGNIFICANT CHANGE UP (ref 11.5–14.5)
SODIUM SERPL-SCNC: 140 MMOL/L — SIGNIFICANT CHANGE UP (ref 135–146)
SODIUM SERPL-SCNC: 145 MMOL/L — SIGNIFICANT CHANGE UP (ref 135–146)
WBC # BLD: 7.25 K/UL — SIGNIFICANT CHANGE UP (ref 4.8–10.8)
WBC # BLD: 7.94 K/UL — SIGNIFICANT CHANGE UP (ref 4.8–10.8)
WBC # FLD AUTO: 7.25 K/UL — SIGNIFICANT CHANGE UP (ref 4.8–10.8)
WBC # FLD AUTO: 7.94 K/UL — SIGNIFICANT CHANGE UP (ref 4.8–10.8)

## 2020-10-15 PROCEDURE — 71045 X-RAY EXAM CHEST 1 VIEW: CPT | Mod: 26

## 2020-10-15 PROCEDURE — 99232 SBSQ HOSP IP/OBS MODERATE 35: CPT

## 2020-10-15 RX ORDER — LEVETIRACETAM 250 MG/1
1 TABLET, FILM COATED ORAL
Qty: 7 | Refills: 0
Start: 2020-10-15

## 2020-10-15 RX ORDER — SODIUM CHLORIDE 9 MG/ML
250 INJECTION INTRAMUSCULAR; INTRAVENOUS; SUBCUTANEOUS ONCE
Refills: 0 | Status: COMPLETED | OUTPATIENT
Start: 2020-10-15 | End: 2020-10-15

## 2020-10-15 RX ADMIN — SERTRALINE 25 MILLIGRAM(S): 25 TABLET, FILM COATED ORAL at 13:06

## 2020-10-15 RX ADMIN — Medication 75 MICROGRAM(S): at 06:01

## 2020-10-15 RX ADMIN — SODIUM CHLORIDE 75 MILLILITER(S): 9 INJECTION, SOLUTION INTRAVENOUS at 07:30

## 2020-10-15 RX ADMIN — LEVETIRACETAM 400 MILLIGRAM(S): 250 TABLET, FILM COATED ORAL at 17:48

## 2020-10-15 RX ADMIN — LEVETIRACETAM 400 MILLIGRAM(S): 250 TABLET, FILM COATED ORAL at 06:01

## 2020-10-15 RX ADMIN — AMLODIPINE BESYLATE 5 MILLIGRAM(S): 2.5 TABLET ORAL at 06:01

## 2020-10-15 RX ADMIN — SODIUM CHLORIDE 250 MILLILITER(S): 9 INJECTION INTRAMUSCULAR; INTRAVENOUS; SUBCUTANEOUS at 23:47

## 2020-10-15 RX ADMIN — PANTOPRAZOLE SODIUM 40 MILLIGRAM(S): 20 TABLET, DELAYED RELEASE ORAL at 13:06

## 2020-10-15 RX ADMIN — HEPARIN SODIUM 5000 UNIT(S): 5000 INJECTION INTRAVENOUS; SUBCUTANEOUS at 21:30

## 2020-10-15 RX ADMIN — HEPARIN SODIUM 5000 UNIT(S): 5000 INJECTION INTRAVENOUS; SUBCUTANEOUS at 13:06

## 2020-10-15 RX ADMIN — HEPARIN SODIUM 5000 UNIT(S): 5000 INJECTION INTRAVENOUS; SUBCUTANEOUS at 06:02

## 2020-10-15 RX ADMIN — SODIUM CHLORIDE 250 MILLILITER(S): 9 INJECTION INTRAMUSCULAR; INTRAVENOUS; SUBCUTANEOUS at 03:40

## 2020-10-15 RX ADMIN — SENNA PLUS 2 TABLET(S): 8.6 TABLET ORAL at 21:31

## 2020-10-15 RX ADMIN — Medication 1 TABLET(S): at 12:09

## 2020-10-15 NOTE — CHART NOTE - NSCHARTNOTEFT_GEN_A_CORE
LMN for hospital bed for cardiopulmonary reasons-  89 yo female admitted for fall out of bed with head trauma on eliquis resulting in subdural hemmorhage.     Patient requires the head of the bed to be elevated more than thurty degrees most of the time due to Alzherimer's dementia and risk for aspiration.     Patient also requires frequent bed positioning of the body in ways not feasible with an ordinary bed due to head trauma to alleviate pain. Pillows and wedges have been considered and ruled out.     Gel Overlay mattress- Patient will need a gel overlay to help with the healing of current skin breakdown and to prevent further skin breakdown. It i medically necessary for patient to receive gel overlay due to her medical diagnosis.

## 2020-10-15 NOTE — PROGRESS NOTE ADULT - SUBJECTIVE AND OBJECTIVE BOX
Progress Note: General Surgery  Patient: MARLENE BOO , 89y (15-Jul-1924)Female   MRN: [id number]  Location: [address]  Visit: 10-12-20 Inpatient  Date: 10-15-20 @ 10:16    Procedure/Diagnosis:     Events/ 24h: No acute events overnight. Pain controlled.    Vitals: T(F): 96.2 (10-15-20 @ 05:12), Max: 98.6 (10-14-20 @ 21:21)  HR: 64 (10-15-20 @ 07:30)  BP: 128/65 (10-15-20 @ 05:12) (100/50 - 141/66)  RR: 19 (10-15-20 @ 07:30)  SpO2: 97% (10-15-20 @ 07:30)    In:   10-14-20 @ 07:01  -  10-15-20 @ 07:00  --------------------------------------------------------  IN: 240 mL      Out:   10-14-20 @ 07:01  -  10-15-20 @ 07:00  --------------------------------------------------------  OUT:    Incontinent per Collection Bag (mL): 250 mL  Total OUT: 250 mL        Net:   10-14-20 @ 07:01  -  10-15-20 @ 07:00  --------------------------------------------------------  NET: -10 mL        Diet: Diet, DASH/TLC:   Sodium & Cholesterol Restricted (10-13-20 @ 11:01)    IV Fluids: dextrose 5% + lactated ringers. 1000 milliLiter(s) (75 mL/Hr) IV Continuous <Continuous>  multivitamin 1 Tablet(s) Oral daily      Physical Examination:  General Appearance: NAD, AAOx1  HEENT: EOMI, sclera non-icteric.  Heart: RRR   Lungs: CTABL.   Abdomen:  Soft, nontender, nondistended.   MSK/Extremities: Warm & well-perfused.   Skin: Warm, dry. No jaundice.       Medications: [Standing]  amLODIPine   Tablet 5 milliGRAM(s) Oral daily  dextrose 5% + lactated ringers. 1000 milliLiter(s) (75 mL/Hr) IV Continuous <Continuous>  heparin   Injectable 5000 Unit(s) SubCutaneous every 8 hours  levETIRAcetam  IVPB 250 milliGRAM(s) IV Intermittent every 12 hours  levothyroxine 75 MICROGram(s) Oral daily  multivitamin 1 Tablet(s) Oral daily  pantoprazole   Suspension 40 milliGRAM(s) Oral daily  senna 2 Tablet(s) Oral at bedtime  sertraline 25 milliGRAM(s) Oral daily    DVT Prophylaxis: heparin   Injectable 5000 Unit(s) SubCutaneous every 8 hours    GI Prophylaxis: pantoprazole   Suspension 40 milliGRAM(s) Oral daily    Antibiotics:   Anticoagulation:   Medications:[PRN]  acetaminophen   Tablet .. 650 milliGRAM(s) Oral every 6 hours PRN  bisacodyl 5 milliGRAM(s) Oral every 12 hours PRN      Labs:                        10.3   7.94  )-----------( 249      ( 15 Oct 2020 01:47 )             32.7     10-15    140  |  110  |  21<H>  ----------------------------<  106<H>  4.1   |  23  |  1.3    Ca    8.5      15 Oct 2020 01:47  Phos  3.2     10-15  Mg     2.1     10-15                Urine/Micro:        Imaging:     Assessment:  89y Female patient admitted S/P fall from bed.    Plan:  restarted HSQ  restart eliquis 10/17  DVT/GI ppx  OOBAT  IS  Pain control    Date/Time: 10-15-20 @ 10:16

## 2020-10-16 ENCOUNTER — TRANSCRIPTION ENCOUNTER (OUTPATIENT)
Age: 85
End: 2020-10-16

## 2020-10-16 LAB
GLUCOSE BLDC GLUCOMTR-MCNC: 106 MG/DL — HIGH (ref 70–99)
GLUCOSE BLDC GLUCOMTR-MCNC: 106 MG/DL — HIGH (ref 70–99)
GLUCOSE BLDC GLUCOMTR-MCNC: 129 MG/DL — HIGH (ref 70–99)
SARS-COV-2 IGG SERPL QL IA: NEGATIVE — SIGNIFICANT CHANGE UP
SARS-COV-2 IGM SERPL IA-ACNC: <0.1 INDEX — SIGNIFICANT CHANGE UP

## 2020-10-16 PROCEDURE — 99232 SBSQ HOSP IP/OBS MODERATE 35: CPT

## 2020-10-16 RX ORDER — IBUPROFEN 200 MG
1 TABLET ORAL
Qty: 28 | Refills: 0
Start: 2020-10-16 | End: 2020-10-22

## 2020-10-16 RX ORDER — ACETAMINOPHEN 500 MG
1 TABLET ORAL
Qty: 40 | Refills: 0
Start: 2020-10-16 | End: 2020-10-25

## 2020-10-16 RX ORDER — APIXABAN 2.5 MG/1
1 TABLET, FILM COATED ORAL
Qty: 60 | Refills: 3
Start: 2020-10-16 | End: 2021-02-12

## 2020-10-16 RX ORDER — LEVETIRACETAM 250 MG/1
1 TABLET, FILM COATED ORAL
Qty: 7 | Refills: 0
Start: 2020-10-16

## 2020-10-16 RX ADMIN — PANTOPRAZOLE SODIUM 40 MILLIGRAM(S): 20 TABLET, DELAYED RELEASE ORAL at 11:43

## 2020-10-16 RX ADMIN — AMLODIPINE BESYLATE 5 MILLIGRAM(S): 2.5 TABLET ORAL at 05:18

## 2020-10-16 RX ADMIN — HEPARIN SODIUM 5000 UNIT(S): 5000 INJECTION INTRAVENOUS; SUBCUTANEOUS at 14:14

## 2020-10-16 RX ADMIN — HEPARIN SODIUM 5000 UNIT(S): 5000 INJECTION INTRAVENOUS; SUBCUTANEOUS at 21:24

## 2020-10-16 RX ADMIN — Medication 1 TABLET(S): at 11:43

## 2020-10-16 RX ADMIN — Medication 75 MICROGRAM(S): at 05:18

## 2020-10-16 RX ADMIN — SERTRALINE 25 MILLIGRAM(S): 25 TABLET, FILM COATED ORAL at 11:43

## 2020-10-16 RX ADMIN — LEVETIRACETAM 400 MILLIGRAM(S): 250 TABLET, FILM COATED ORAL at 05:18

## 2020-10-16 RX ADMIN — SENNA PLUS 2 TABLET(S): 8.6 TABLET ORAL at 21:24

## 2020-10-16 RX ADMIN — LEVETIRACETAM 400 MILLIGRAM(S): 250 TABLET, FILM COATED ORAL at 17:04

## 2020-10-16 RX ADMIN — HEPARIN SODIUM 5000 UNIT(S): 5000 INJECTION INTRAVENOUS; SUBCUTANEOUS at 05:18

## 2020-10-16 NOTE — PROGRESS NOTE ADULT - ASSESSMENT
Assessment:  89y Female patient admitted S/P fall from bed.    Plan:  restarted HSQ  restart eliquis 10/18  DVT/GI ppx  OOBAT  IS  Pain control

## 2020-10-16 NOTE — DISCHARGE NOTE NURSING/CASE MANAGEMENT/SOCIAL WORK - PATIENT PORTAL LINK FT
You can access the FollowMyHealth Patient Portal offered by [facility] by registering at the following website: [email or web address]. By joining [employer]’s FollowMyHealth portal, you will also be able to view your health information using other applications (apps) compatible with our system.

## 2020-10-16 NOTE — DISCHARGE NOTE PROVIDER - CARE PROVIDER_API CALL
Isai Maria  SURGERY  [address], [address]rd [place] [address]  Phone: [phone]  Fax: [phone]  Follow Up Time:     Howard Brewer  NEUROSURGERY  [address] [place] [address], [address]  [place] [address]  Phone: [phone]  Fax: [phone]  Follow Up Time:

## 2020-10-16 NOTE — DISCHARGE NOTE PROVIDER - HOSPITAL COURSE
Ms. Hawkins is a 89 year old female with a past medical history of Alzheimer's dementia, htn, hypothyroidism, DVT (on eliquis) and severe dementia that presented to the hospital after a fall from bed on to the floor with +HT, -LOC, and anticoagulated on eliquis. Patient had a 2cm occipatel scalp laceration with josé ctive bleeding. CT head showed an acute subdural hematoma. She was started on kcentra and admitted to the surgical ICU. She was also started on Keppra. Patient remained stable in the SICU, at her baseline mentation A&O x1.Patient was downgraded from ICU on 10/15. On day of discharge 10/16, patient is at her baseline mentation, tolerating diet. Patient will be discharged home with 24 hour aid. Patient will continue Keppra until 10/18, and will be restarted on eliquis after 10/18.    HOSPITAL COURSE:     Patient was medically optimized and improved clinically throughout hospital course. Patient seen and examined on day of discharge.    Vital Signs  T(C): 36.3 (16 Oct 2020 12:36), Max: 36.8 (16 Oct 2020 05:12)  T(F): 97.3 (16 Oct 2020 12:36), Max: 98.2 (16 Oct 2020 05:12)  HR: 64 (16 Oct 2020 12:36) (64 - 83)  BP: 104/49 (16 Oct 2020 12:36) (104/49 - 129/59)  RR: 16 (16 Oct 2020 12:36) (16 - 18)  SpO2: 97% (15 Oct 2020 21:04) (97% - 97%)    Physical Exam:  General: well-developed, well-nourished, NAD  HEENT: normocephalic, atraumatic, EOMI, moist mucous membranes   Neck: supple, non-tender, no masses  Neurology: AAOx3, sensation intact  Respiratory: clear to auscultation bilaterally; no wheezes, rhonchi, or rales  CV: regular rate and rhythm, soft S1/S2, no murmurs, rubs, or gallops  Abdominal: soft, non-tender, non-distended, bowel sounds present  Extremities: no clubbing, cyanosis, or edema; palpable peripheral pulses  Musculoskeletal: no joint erythema or warmth, no joint swelling   Skin: warm, dry, normal color    Patient is medically stable for discharge to home with outpatient follow up.       Ms. Hawkins is a 89 year old female with a past medical history of Alzheimer's dementia, htn, hypothyroidism, DVT (on eliquis) and severe dementia that presented to the hospital after a fall from bed on to the floor with +HT, -LOC, and anticoagulated on eliquis. Patient had a 2cm occipatel scalp laceration with josé ctive bleeding. CT head showed an acute subdural hematoma. She was started on kcentra and admitted to the surgical ICU. She was also started on Keppra. Patient remained stable in the SICU, at her baseline mentation A&O x1.Patient was downgraded from ICU on 10/15. On day of discharge 10/16, patient is at her baseline mentation, tolerating diet. Patient will be discharged home with 24 hour aid. Patient will continue Keppra until 10/18, and will be restarted on eliquis after 10/18.    Patient was medically optimized and improved clinically throughout hospital course. Patient seen and examined on day of discharge. Deemed ready for discharge    Vital Signs  ICU Vital Signs Last 24 Hrs  T(C): 36.1 (17 Oct 2020 09:04), Max: 36.3 (16 Oct 2020 12:36)  T(F): 96.9 (17 Oct 2020 09:04), Max: 97.3 (16 Oct 2020 12:36)  HR: 60 (17 Oct 2020 09:04) (60 - 79)  BP: 130/57 (17 Oct 2020 09:04) (104/49 - 167/79)  SpO2: 98% (17 Oct 2020 05:37) (98% - 99%)      Physical Exam:  General: well-developed, well-nourished, NAD  HEENT: normocephalic, atraumatic, EOMI, moist mucous membranes   Neck: supple, non-tender, no masses  Neurology: AAOx3, sensation intact  Respiratory: clear to auscultation bilaterally; no wheezes, rhonchi, or rales  CV: regular rate and rhythm, soft S1/S2, no murmurs, rubs, or gallops  Abdominal: soft, non-tender, non-distended, bowel sounds present  Extremities: no clubbing, cyanosis, or edema; palpable peripheral pulses  Musculoskeletal: no joint erythema or warmth, no joint swelling   Skin: warm, dry, normal color    Patient is medically stable for discharge to home with outpatient follow up.

## 2020-10-16 NOTE — DISCHARGE NOTE PROVIDER - NSDCCPCAREPLAN_GEN_ALL_CORE_FT
PRINCIPAL DISCHARGE DIAGNOSIS  Diagnosis: Subdural hematoma  Assessment and Plan of Treatment:       SECONDARY DISCHARGE DIAGNOSES  Diagnosis: Fall in home, initial encounter  Assessment and Plan of Treatment:     Diagnosis: Laceration of scalp, initial encounter  Assessment and Plan of Treatment:

## 2020-10-16 NOTE — DISCHARGE NOTE PROVIDER - NSDCMRMEDTOKEN_GEN_ALL_CORE_FT
acetaminophen 650 mg oral tablet, extended release: 1 tab(s) orally every 6 hours   amLODIPine 5 mg oral tablet: 1 tab(s) orally once a day  apixaban 5 mg oral tablet: 1 tab(s) orally 2 times a day  bisacodyl 5 mg oral delayed release tablet: 1 tab(s) orally every 12 hours, As needed, Constipation  ibuprofen 600 mg oral tablet: 1 tab(s) orally every 6 hours   Keppra 250 mg oral tablet: 1 tab(s) orally every 12 hours   levothyroxine 75 mcg (0.075 mg) oral tablet: 1 tab(s) orally once a day  melatonin 5 mg oral tablet: 1 tab(s) orally once a day (at bedtime)  Multiple Vitamins oral tablet: 1 tab(s) orally once a day  senna oral tablet: 2 tab(s) orally once a day (at bedtime)  sertraline 25 mg oral tablet: 1 tab(s) orally once a day

## 2020-10-16 NOTE — PROGRESS NOTE ADULT - SUBJECTIVE AND OBJECTIVE BOX
GENERAL SURGERY PROGRESS NOTE     MARLENE BOO  89y  Female  Hospital day :4d  POD:  Procedure:   OVERNIGHT EVENTS: no acute overnight events     T(F): 98.2 (10-16-20 @ 05:12), Max: 98.2 (10-16-20 @ 05:12)  HR: 72 (10-16-20 @ 05:12) (64 - 83)  BP: 114/60 (10-16-20 @ 05:12) (114/56 - 129/59)  RR: 18 (10-16-20 @ 05:12) (18 - 18)  SpO2: 97% (10-15-20 @ 21:04) (97% - 97%)    DIET/FLUIDS: dextrose 5% + lactated ringers. 1000 milliLiter(s) IV Continuous <Continuous>  multivitamin 1 Tablet(s) Oral daily       GI proph:  pantoprazole   Suspension 40 milliGRAM(s) Oral daily    AC/ proph: heparin   Injectable 5000 Unit(s) SubCutaneous every 8 hours    ABx:     PHYSICAL EXAM:  GENERAL: NAD, well-appearing  CHEST/LUNG: Clear to auscultation bilaterally  HEART: Regular rate and rhythm  ABDOMEN: Soft, Nontender, Nondistended;   EXTREMITIES:  No clubbing, cyanosis, or edema      LABS  Labs:  CAPILLARY BLOOD GLUCOSE      POCT Blood Glucose.: 106 mg/dL (16 Oct 2020 06:18)  POCT Blood Glucose.: 106 mg/dL (16 Oct 2020 00:33)  POCT Blood Glucose.: 145 mg/dL (15 Oct 2020 18:00)  POCT Blood Glucose.: 118 mg/dL (15 Oct 2020 11:34)                          10.4   7.25  )-----------( 253      ( 15 Oct 2020 20:53 )             33.0       Auto Neutrophil %: 67.3 % (10-15-20 @ 20:53)  Auto Immature Granulocyte %: 0.3 % (10-15-20 @ 20:53)    10-15    145  |  110  |  26<H>  ----------------------------<  115<H>  4.6   |  24  |  1.4      Calcium, Total Serum: 8.6 mg/dL (10-15-20 @ 20:53)      LFTs:         Coags:                    RADIOLOGY & ADDITIONAL TESTS:    no new images

## 2020-10-16 NOTE — DISCHARGE NOTE PROVIDER - CARE PROVIDERS DIRECT ADDRESSES
[email or web address]

## 2020-10-16 NOTE — DISCHARGE NOTE PROVIDER - NSFOLLOWUPCLINICS_GEN_ALL_ED_FT
[facility]  Trauma Surgery  [address] [place] [address]  Phone: [phone]  Fax:   Follow Up Time:

## 2020-10-16 NOTE — DISCHARGE NOTE PROVIDER - NSDCFUADDINST_GEN_ALL_CORE_FT
please follow up with adore Huang in 2 weeks as outpatient   please start Eliquis at 10/18 as recommended by neurosurgery   please stop the keppra by 10/17    please follow up with adore Huang in 2 weeks as outpatient   please start Eliquis at 10/18 as recommended by neurosurgery   Continue keppra until  10/18

## 2020-10-17 VITALS
DIASTOLIC BLOOD PRESSURE: 57 MMHG | TEMPERATURE: 97 F | SYSTOLIC BLOOD PRESSURE: 130 MMHG | HEART RATE: 60 BPM | RESPIRATION RATE: 18 BRPM

## 2020-10-17 PROCEDURE — 99238 HOSP IP/OBS DSCHRG MGMT 30/<: CPT

## 2020-10-17 RX ORDER — LEVETIRACETAM 250 MG/1
250 TABLET, FILM COATED ORAL
Refills: 0 | Status: DISCONTINUED | OUTPATIENT
Start: 2020-10-17 | End: 2020-10-17

## 2020-10-17 RX ORDER — LEVETIRACETAM 250 MG/1
1 TABLET, FILM COATED ORAL
Qty: 4 | Refills: 0
Start: 2020-10-17

## 2020-10-17 RX ADMIN — LEVETIRACETAM 250 MILLIGRAM(S): 250 TABLET, FILM COATED ORAL at 05:57

## 2020-10-17 RX ADMIN — AMLODIPINE BESYLATE 5 MILLIGRAM(S): 2.5 TABLET ORAL at 05:57

## 2020-10-17 RX ADMIN — Medication 75 MICROGRAM(S): at 05:57

## 2020-10-17 RX ADMIN — HEPARIN SODIUM 5000 UNIT(S): 5000 INJECTION INTRAVENOUS; SUBCUTANEOUS at 05:56

## 2020-10-20 ENCOUNTER — APPOINTMENT (OUTPATIENT)
Dept: GERIATRICS | Facility: CLINIC | Age: 85
End: 2020-10-20
Payer: MEDICARE

## 2020-10-20 PROCEDURE — 99212 OFFICE O/P EST SF 10 MIN: CPT | Mod: 95,GC
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
## 2020-10-21 DIAGNOSIS — E03.9 HYPOTHYROIDISM, UNSPECIFIED: ICD-10-CM

## 2020-10-21 DIAGNOSIS — Z88.2 ALLERGY STATUS TO SULFONAMIDES: ICD-10-CM

## 2020-10-21 DIAGNOSIS — Z78.1 PHYSICAL RESTRAINT STATUS: ICD-10-CM

## 2020-10-21 DIAGNOSIS — Z79.01 LONG TERM (CURRENT) USE OF ANTICOAGULANTS: ICD-10-CM

## 2020-10-21 DIAGNOSIS — F32.9 MAJOR DEPRESSIVE DISORDER, SINGLE EPISODE, UNSPECIFIED: ICD-10-CM

## 2020-10-21 DIAGNOSIS — Z86.718 PERSONAL HISTORY OF OTHER VENOUS THROMBOSIS AND EMBOLISM: ICD-10-CM

## 2020-10-21 DIAGNOSIS — I34.0 NONRHEUMATIC MITRAL (VALVE) INSUFFICIENCY: ICD-10-CM

## 2020-10-21 DIAGNOSIS — R45.1 RESTLESSNESS AND AGITATION: ICD-10-CM

## 2020-10-21 DIAGNOSIS — S06.5X0A TRAUMATIC SUBDURAL HEMORRHAGE WITHOUT LOSS OF CONSCIOUSNESS, INITIAL ENCOUNTER: ICD-10-CM

## 2020-10-21 DIAGNOSIS — I10 ESSENTIAL (PRIMARY) HYPERTENSION: ICD-10-CM

## 2020-10-21 DIAGNOSIS — N17.9 ACUTE KIDNEY FAILURE, UNSPECIFIED: ICD-10-CM

## 2020-10-21 DIAGNOSIS — I34.1 NONRHEUMATIC MITRAL (VALVE) PROLAPSE: ICD-10-CM

## 2020-10-21 DIAGNOSIS — S01.01XA LACERATION WITHOUT FOREIGN BODY OF SCALP, INITIAL ENCOUNTER: ICD-10-CM
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
## 2020-10-21 DIAGNOSIS — G47.00 INSOMNIA, UNSPECIFIED: ICD-10-CM
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
## 2020-10-21 DIAGNOSIS — R40.2412 GLASGOW COMA SCALE SCORE 13-15, AT ARRIVAL TO EMERGENCY DEPARTMENT: ICD-10-CM

## 2020-10-21 DIAGNOSIS — Z96.643 PRESENCE OF ARTIFICIAL HIP JOINT, BILATERAL: ICD-10-CM

## 2020-10-21 DIAGNOSIS — E87.5 HYPERKALEMIA: ICD-10-CM

## 2020-10-21 DIAGNOSIS — F02.80 DEMENTIA IN OTHER DISEASES CLASSIFIED ELSEWHERE, UNSPECIFIED SEVERITY, WITHOUT BEHAVIORAL DISTURBANCE, PSYCHOTIC DISTURBANCE, MOOD DISTURBANCE, AND ANXIETY: ICD-10-CM

## 2020-10-21 DIAGNOSIS — G30.9 ALZHEIMER'S DISEASE, UNSPECIFIED: ICD-10-CM

## 2020-11-02 NOTE — HISTORY OF PRESENT ILLNESS
[Moderate] : Stage: Moderate [Patient Observed To Be Agitated] : improved agitation [Sleep Disturbances] : improved sleep disturbances [FreeTextEntry1] : Patient doing well, blood work reviewed, no side effects to increased medication doses

## 2020-11-02 NOTE — REASON FOR VISIT
[Home] : at home, [unfilled] , at the time of the visit. [Other Location: e.g. Home (Enter Location, City,State)___] : at [unfilled] [Family Member] : family member [FreeTextEntry3] : Ron Hawkins son

## 2020-11-02 NOTE — ASSESSMENT
[FreeTextEntry1] : Dementia with behavioral disturbance- improved on increased doses of sertraline and Latuda and melatonin\par \par palliative approach, will need documentation of ACD, social service consult\par  [Medication Management] : medication management

## 2020-11-02 NOTE — PHYSICAL EXAM
[General Appearance - Alert] : alert [General Appearance - In No Acute Distress] : in no acute distress [] : no respiratory distress [Respiration, Rhythm And Depth] : normal respiratory rhythm and effort

## 2020-11-09 ENCOUNTER — APPOINTMENT (OUTPATIENT)
Dept: GERIATRICS | Facility: CLINIC | Age: 85
End: 2020-11-09
Payer: MEDICARE

## 2020-11-09 DIAGNOSIS — G47.01 INSOMNIA DUE TO MEDICAL CONDITION: ICD-10-CM

## 2020-11-09 DIAGNOSIS — R39.9 UNSPECIFIED SYMPTOMS AND SIGNS INVOLVING THE GENITOURINARY SYSTEM: ICD-10-CM

## 2020-11-09 DIAGNOSIS — F03.91 UNSPECIFIED DEMENTIA WITH BEHAVIORAL DISTURBANCE: ICD-10-CM

## 2020-11-09 DIAGNOSIS — E03.9 HYPOTHYROIDISM, UNSPECIFIED: ICD-10-CM

## 2020-11-09 DIAGNOSIS — F41.9 ANXIETY DISORDER, UNSPECIFIED: ICD-10-CM

## 2020-11-09 PROCEDURE — 99213 OFFICE O/P EST LOW 20 MIN: CPT | Mod: GC,95

## 2020-11-09 NOTE — REASON FOR VISIT
[Home] : at home, [unfilled] , at the time of the visit. [Other Location: e.g. Home (Enter Location, City,State)___] : at [unfilled] [Family Member] : family member [Other:____] : [unfilled] [Verbal consent obtained from patient] : the patient, [unfilled] [Follow-Up] : a follow-up visit

## 2020-11-10 PROBLEM — G47.01 INSOMNIA DUE TO MEDICAL CONDITION: Status: ACTIVE | Noted: 2020-09-22

## 2020-11-10 PROBLEM — F41.9 ANXIETY DISORDER: Status: ACTIVE | Noted: 2020-11-05

## 2020-11-10 PROBLEM — E03.9 HYPOTHYROIDISM: Status: ACTIVE | Noted: 2019-11-14

## 2020-11-12 NOTE — HISTORY OF PRESENT ILLNESS
[Severe] : Stage: Severe [Worse] : Status: Worse [Hostility Toward Caregivers] : worsened aggression [Sleep Disturbances] : worsened sleep disturbances [FreeTextEntry1] : The patient is a 89 year old female with a PMH of anxiety disorder, dementia w/ behavioral disturbances, hypothyroidism, w/ recent fall several weeks ago s/p subdural hematoma presenting via telehealth for follow-up. The patient's son states the patient continues to experience agitation new from prior to her fall, causing her to get up repeatedly in the night; she only sleeps about 2 hours at a time and per son is restrained with a belt in the chair so as not to fall in case no one is present at the moment. Dr. Jeaneth Zurita (neurologist) stopped her sertraline and started lorazepam once daily for her agitation. Since her fall she can only tolerate puree or liquid foods; prior to her fall she was reportedly able to swallow solid foods. The son also reports the patient has been experiencing worsening constipation over the last week, occasionally requiring manual disimpaction. Per the son, the patient's 24 hour home health aid is "exhausted" and is sometimes sleeping during which time the patient is not being watched adequately; he is therefore requesting for more than 1 aid to be present, split in 12-hour shifts.

## 2020-11-12 NOTE — REVIEW OF SYSTEMS
[Constipation] : constipation [Fever] : no fever [Chills] : no chills [Chest Pain] : no chest pain [Shortness Of Breath] : no shortness of breath [Wheezing] : no wheezing [Abdominal Pain] : no abdominal pain [Vomiting] : no vomiting

## 2020-11-12 NOTE — PHYSICAL EXAM
[General Appearance - Alert] : alert [General Appearance - In No Acute Distress] : in no acute distress [General Appearance - Well-Appearing] : healthy appearing [] : no respiratory distress [Exaggerated Use Of Accessory Muscles For Inspiration] : no accessory muscle use [FreeTextEntry1] : Patient minimally verbal but aware of my presence.

## 2020-11-13 ENCOUNTER — LABORATORY RESULT (OUTPATIENT)
Age: 85
End: 2020-11-13

## 2020-11-19 ENCOUNTER — LABORATORY RESULT (OUTPATIENT)
Age: 85
End: 2020-11-19

## 2020-11-19 LAB
ALBUMIN SERPL ELPH-MCNC: 3.8 G/DL
ALP BLD-CCNC: 90 U/L
ALT SERPL-CCNC: 10 U/L
ANION GAP SERPL CALC-SCNC: 11 MMOL/L
AST SERPL-CCNC: 16 U/L
BASOPHILS # BLD AUTO: 0.05 K/UL
BASOPHILS NFR BLD AUTO: 0.8 %
BILIRUB SERPL-MCNC: 0.3 MG/DL
BUN SERPL-MCNC: 17 MG/DL
CALCIUM SERPL-MCNC: 9.4 MG/DL
CHLORIDE SERPL-SCNC: 102 MMOL/L
CO2 SERPL-SCNC: 26 MMOL/L
CREAT SERPL-MCNC: 1.2 MG/DL
EOSINOPHIL # BLD AUTO: 0.28 K/UL
EOSINOPHIL NFR BLD AUTO: 4.2 %
GLUCOSE SERPL-MCNC: 83 MG/DL
HCT VFR BLD CALC: 35.9 %
HGB BLD-MCNC: 11.7 G/DL
IMM GRANULOCYTES NFR BLD AUTO: 0.2 %
LYMPHOCYTES # BLD AUTO: 1.76 K/UL
LYMPHOCYTES NFR BLD AUTO: 26.6 %
MAN DIFF?: NORMAL
MCHC RBC-ENTMCNC: 30.5 PG
MCHC RBC-ENTMCNC: 32.6 G/DL
MCV RBC AUTO: 93.7 FL
MONOCYTES # BLD AUTO: 0.54 K/UL
MONOCYTES NFR BLD AUTO: 8.2 %
NEUTROPHILS # BLD AUTO: 3.97 K/UL
NEUTROPHILS NFR BLD AUTO: 60 %
PLATELET # BLD AUTO: 285 K/UL
POTASSIUM SERPL-SCNC: 4.4 MMOL/L
PROT SERPL-MCNC: 6.1 G/DL
RBC # BLD: 3.83 M/UL
RBC # FLD: 12.2 %
SODIUM SERPL-SCNC: 139 MMOL/L
WBC # FLD AUTO: 6.61 K/UL

## 2020-12-03 ENCOUNTER — APPOINTMENT (OUTPATIENT)
Dept: GERIATRICS | Facility: CLINIC | Age: 85
End: 2020-12-03

## 2020-12-03 RX ORDER — HYDROCORTISONE 10 MG/G
1 CREAM TOPICAL
Qty: 1 | Refills: 5 | Status: DISCONTINUED | COMMUNITY
Start: 2020-05-28 | End: 2020-12-03

## 2020-12-03 RX ORDER — DOCUSATE SODIUM 100 MG/1
100 CAPSULE ORAL
Qty: 180 | Refills: 0 | Status: ACTIVE | COMMUNITY
Start: 2020-12-03

## 2020-12-03 RX ORDER — LURASIDONE HYDROCHLORIDE 60 MG/1
60 TABLET, FILM COATED ORAL DAILY
Refills: 0 | Status: ACTIVE | COMMUNITY
Start: 2020-12-03

## 2020-12-03 RX ORDER — SERTRALINE HYDROCHLORIDE 50 MG/1
50 TABLET, FILM COATED ORAL DAILY
Qty: 90 | Refills: 3 | Status: DISCONTINUED | COMMUNITY
Start: 2020-09-21 | End: 2020-12-03

## 2020-12-10 RX ORDER — LORAZEPAM 1 MG/1
1 TABLET ORAL TWICE DAILY
Qty: 60 | Refills: 0 | Status: ACTIVE | COMMUNITY
Start: 2020-11-05 | End: 1900-01-01

## 2020-12-16 ENCOUNTER — RX RENEWAL (OUTPATIENT)
Age: 85
End: 2020-12-16

## 2020-12-16 LAB — TSH SERPL-ACNC: 7.26 UIU/ML

## 2020-12-16 RX ORDER — MELATONIN 5 MG
5 CAPSULE ORAL
Qty: 180 | Refills: 0 | Status: ACTIVE | COMMUNITY
Start: 2020-12-16 | End: 1900-01-01

## 2021-03-04 ENCOUNTER — APPOINTMENT (OUTPATIENT)
Dept: GERIATRICS | Facility: CLINIC | Age: 86
End: 2021-03-04

## 2021-03-12 ENCOUNTER — TRANSCRIPTION ENCOUNTER (OUTPATIENT)
Age: 86
End: 2021-03-12

## 2021-03-17 ENCOUNTER — RX RENEWAL (OUTPATIENT)
Age: 86
End: 2021-03-17

## 2021-03-29 NOTE — ED ADULT NURSE NOTE - GENITOURINARY ASSESSMENT
- - - Doxepin Counseling:  Patient advised that the medication is sedating and not to drive a car after taking this medication. Patient informed of potential adverse effects including but not limited to dry mouth, urinary retention, and blurry vision.  The patient verbalized understanding of the proper use and possible adverse effects of doxepin.  All of the patient's questions and concerns were addressed.

## 2021-06-18 ENCOUNTER — RX RENEWAL (OUTPATIENT)
Age: 86
End: 2021-06-18

## 2021-06-18 RX ORDER — CHLORHEXIDINE GLUCONATE 4 %
5 LIQUID (ML) TOPICAL
Qty: 180 | Refills: 0 | Status: ACTIVE | COMMUNITY
Start: 2020-09-22 | End: 1900-01-01

## 2021-07-27 NOTE — DISCHARGE NOTE PROVIDER - NSDCHC_MEDRECSTATUS_GEN_ALL_CORE
Admission Reconciliation is Completed  Discharge Reconciliation is Completed
[Follow-Up] : a follow-up visit

## 2021-11-09 NOTE — PHYSICAL THERAPY INITIAL EVALUATION ADULT - REHAB POTENTIAL, PT EVAL
[Time Spent: ___ minutes] : I have spent [unfilled] minutes of time on the encounter. fair, will monitor progress closely

## 2022-08-02 NOTE — ED ADULT NURSE NOTE - NS ED NURSE REPORT GIVEN TO FT
Occupational Therapy  Facility/Department: [facility] C5 - MED SURG/ORTHO  Daily Treatment Note  NAME: Shannon Armendariz  : 1947  MRN: [id number]    [facility] 126         Time Out 0843         Minutes Gianna Wynn 45, JACKSON/L 4c 18b RN

## 2023-03-17 NOTE — ED ADULT NURSE NOTE - NS ED NURSE DC INFO COMPLEXITY
Patient can be transitioned to Po abx. Stable for D/C home
Continue IV abx. Patient refuses B/L LE dopplers
Simple: Patient demonstrates quick and easy understanding

## 2024-02-21 NOTE — PROGRESS NOTE ADULT - ATTENDING COMMENTS
Yusuf Castro M.D.,[facility]  Carlin Barrera D.O., RED., [facility]  Anjelica Bruner M.D.  Monica Aponte M.D.   Wild Goss D.O.      Patient:  Krystal Hyatt 85 y.o. female MRN: [id number]     Date of Service: 2/21/2024      PULMONARY CONSULTATION    Reason for Consultation: Multifocal pneumonia, history of COPD on 5 L O2  Referring Physician: Dr. Anthony Shah MD    Communication with the referring physician will be sent via the electronic medical record.    Chief Complaint: Shortness of breath    CODE STATUS: Full    SUBJECTIVE:  HPI:  Krystal Hyatt is a 85 y.o. female who we are asked to evaluate for multifocal pneumonia, history of COPD on 5 L O2.  She has a past medical history significant for A-fib on warfarin, CHF, systolic heart failure with reduced EF 30%, pulmonary hypertension WHO group 2, large hiatal hernia, emphysema, hypertension, hyperlipidemia, thyroid disease, prior cardioversion, oxygen dependence, mitral valve regurgitation, former smoker 92 pack years in remission quit 1998.    She is a known patient to our practice followed by Dr. Monica Aponte for chronic hypoxic respiratory failure and COPD.  She was last seen in office August 2023 for follow-up.  Her baseline oxygen requirement is 2 L nasal cannula with rest and 3 L with activity.  She has had multiple hospital stays over the past year treated for COPD exacerbation receiving steroids, bronchodilators, and bronchodilators.  She has a complex pulmonary history consisting of emphysema, interstitial lung disease, squamous cell lung cancer stage IIb treated with radiation in August 2021 at [facility].  She has chronic collapse of right middle lobe dating back to 2021.  Prior PFTs in office interpreted by Dr. Aponte from June 2022 with moderately severe airflow obstruction FEV1 prebronchodilator 47% of predicted, post bronchodilator 50% of predicted with no significant response.   DLCO moderately reduced 43% of predicted.  Prior CT chest 2022  doing well   tolerating diet   for discharge today

## 2025-04-28 NOTE — ED ADULT NURSE NOTE - MODE OF DISCHARGE
[facility] [address] [id number] [phone]       Mino Perea  [id number]  1990    ORTHOPAEDIC SURGERY OUTPATIENT NOTE  4/28/2025    :  Assessment & Plan  Ulnar neuropathy of left upper extremity  EMG dated 3/14/25 was reviewed  in the office today with the patient which demonstrates mild ulnar nerve neuropathy  Patient at this time his symptoms are improving   Discussed with patient conservative treatments which includes splinting at night,over the counter anti-inflammatories for pain, apply Voltaren gel over the left elbow for pain relief and modifying workouts when lifting weights   Also discussed with patient may consider surgery ulnar nerve decompression in the futures if his symptoms do not improve.   Follow up PRN               HISTORY:  34 y.o. male  presents today evaluation for his left elbow.He denies any injuries.  He states he has been having numbness sand tingling in the 3rd and 4th digits at night when sleeping. He notes when he repositions and the numbness resolves. He  did try a poor beryl splint with some relief. He states he has been driving a lot and notices he leans in his left elbow a lot.  He notes overall his symptoms are improving. He  did have a EMG on 3/14/25 which demonstrates mild ulnar neuropathy. He denies any weakness.    Past Medical History:   Diagnosis Date    Anxiety     Eczema     Tinea pedis        History reviewed. No pertinent surgical history.    Social History     Socioeconomic History    Marital status: /Civil Union     Spouse name: Not on file    Number of children: Not on file    Years of education: Not on file    Highest education level: Not on file   Occupational History    Not on file   Tobacco Use    Smoking status: Never    Smokeless tobacco: Never   Vaping Use    Vaping status: Never Used   Substance and Sexual Activity    Alcohol use: Yes     Alcohol/week:  "1.0 standard drink of alcohol     Types: 1 Standard drinks or equivalent per week    Drug use: Never    Sexual activity: Yes     Partners: Female     Birth control/protection: OCP   Other Topics Concern    Not on file   Social History Narrative    Not on file     Social Drivers of Health     Financial Resource Strain: Not on file   Food Insecurity: Not on file   Transportation Needs: Not on file   Physical Activity: Not on file   Stress: Not on file   Social Connections: Not on file   Intimate Partner Violence: Not on file   Housing Stability: Not on file       Family History   Problem Relation Age of Onset    Hypertension Father     Multiple sclerosis Maternal Grandmother     Skin cancer Maternal Grandfather     ALS Other         Patient's Medications   New Prescriptions    No medications on file   Previous Medications    FLUCONAZOLE (DIFLUCAN) 200 MG TABLET    TAKE 1 TABLET BY MOUTH EVERY 7 DAYS FOR 12 WEEKS    SERTRALINE (ZOLOFT) 100 MG TABLET    Take 1 tablet (100 mg total) by mouth daily    SODIUM FLUORIDE 5000 PPM 1.1 % PSTE    Use as directed    TRIAMCINOLONE (KENALOG) 0.1 % OINTMENT    Apply topically 2 (two) times a day for 2 weeks. Then 2 times a day Mondays, Wednesdays, and Fridays only.   Modified Medications    No medications on file   Discontinued Medications    No medications on file       No Known Allergies     Ht 5' 9\" (1.753 m)   Wt 77.9 kg (171 lb 12.8 oz)   BMI 25.37 kg/m²      REVIEW OF SYSTEMS:  Constitutional: Negative.    HEENT: Negative.    Respiratory: Negative.    Skin: Negative.    Neurological: Negative.    Psychiatric/Behavioral: Negative.  Musculoskeletal: Negative except for that mentioned in the HPI.    Gen: No acute distress, resting comfortably in bed  HEENT: Eyes clear, moist mucus membranes, hearing intact  Respiratory: No audible wheezing or stridor  Cardiovascular: Well Perfused peripherally, 2+ distal pulse  Abdomen: nondistended, no peritoneal signs     PHYSICAL EXAM:  "   LEFT ELBOW:    Appearance: Skin intact     Flexion: 140 degrees  Extension: 0 degrees  Pronation: 80 degrees  Supination: 80 degrees    TTP Lateral Epicondyle: negative  TTP Medial Epicondyle: negative  TTP Olecranon: negative  TTP Radial Head: negative  TTP Biceps Tendon: negative     ulnar nerve subluxation : Negative     Strength:  Flexion: 5/5  Extension: 5/5  Pronation: 5/5  Supination: 5/5    Pain with resisted wrist extension: negative  Pain with resisted 3rd finger extension: negative  Pain with resisted wrist flexion: negative    Varus laxity: negative  Valgus laxity: negative  Milking maneuver: negative  Moving valgus stress test: negative      Cubital tunnel Tinel's: +     Radial/median/ulnar nerve intact    <2 sec cap refill       IMAGING:    Not taken today       Scribe Attestation      I,:  Cass Payne MA am acting as a scribe while in the presence of the attending physician.:       I,:  Cyril Victor DO personally performed the services described in this documentation    as scribed in my presence.:                Ambulatory

## 2025-05-11 NOTE — PATIENT PROFILE ADULT - FUNCTIONAL SCREEN CURRENT LEVEL: COMMUNICATION, MLM
Hepatology Note    PATIENT: Erendira Pretty  MRN: [id number]  DATE: 5/13/2025    Provider: Hepatologist - Dr Gayle  Urgency of review: non-urgent  Referring provider: No ref. provider found    Diagnosis:   1. Other cirrhosis of liver    2. HCC (hepatocellular carcinoma)    3. Liver disease, unspecified        Chief complaint:   Chief Complaint   Patient presents with    Cirrhosis    Hepatocellular Carcinoma       Subjective:    Initial History: Erendira Pretty is a 78 y.o. male who was referred to Hepatology Clinic for consultation of cirrhosis with HCC      05/13/2025   Patient is new to me and previously seen by Dr Wood  He denies recent hematemesis, coffee ground emesis, melena, hematochezia, jaundice, confusion, LE edema, abdominal distension.  HCC s/p ablation 9/2019, 1/2022     Prior Relevant History:    He  denies hepatotoxic medication    Review of systems:  A review of 12+ systems was conducted with pertinent positive and negative findings documented in HPI with all other systems reviewed and negative.      PFSH:  Past medical, family, and social history reviewed as documented in chart with pertinent positive medical, family, and social history detailed in HPI.    Past Medical History:   Past Medical History:   Diagnosis Date    Anticoagulant long-term use     Aortic stenosis     Asthma     Benign prostatic hyperplasia with nocturia     Bilateral carotid artery stenosis 07/21/2021    US CAROTID BILATERAL 07/14/2021 IMPRESSION: Atherosclerosis with suspected stenosis greater than 50% at the right proximal ICA visually. Velocities are discordant and appear improved from prior. Atherosclerosis on the left with no evidence of flow-limiting stenosis greater than 50%.  FINDINGS: Right: Internal Carotid Artery (ICA): Peak systolic velocity 118 cm/sec. End diastolic velocity 35 cm/sec    BPH (benign prostatic hyperplasia)     CAD (coronary artery disease)     40% lesion 2002;lazo    Cirrhosis     Claudication  04/09/2014    Colon polyp     COPD (chronic obstructive pulmonary disease)     ED (erectile dysfunction)     Encounter for blood transfusion     Ex-smoker     Hearing loss NEC     Hepatitis C     Cured;reji brown 2015    Hyperlipidemia     Hypertension     Hypothyroid     s/p tx graves    Open angle with borderline findings and low glaucoma risk in both eyes 09/22/2020    DELONTE on CPAP     Osteoarthritis     Paroxysmal atrial fibrillation     PVD (peripheral vascular disease)     Secondary esophageal varices without bleeding 06/26/2017    Type 2 diabetes mellitus        Past Surgical HIstory:   Past Surgical History:   Procedure Laterality Date    ABLATION OF ARRHYTHMOGENIC FOCUS FOR ATRIAL FIBRILLATION N/A 6/24/2024    Procedure: Ablation atrial fibrillation;  Surgeon: Horacio Huerta MD;  Location: [facility] EP LAB;  Service: Cardiology;  Laterality: N/A;  afib, PVI, RFA, BERNARD (cx if SR), ZIA, anes, MB, 3 Prep, 3 hours per Dr. Huerta    ANGIOGRAM, CORONARY, WITH LEFT HEART CATHETERIZATION N/A 8/27/2024    Procedure: Angiogram, Coronary, with Left Heart Cath;  Surgeon: Stanton Jefferson MD;  Location: [facility] CATH LAB;  Service: Cardiology;  Laterality: N/A;    ANGIOGRAM, EXTREMITY, UNILATERAL Left 1/4/2024    Procedure: ANGIOGRAM, EXTREMITY, UNILATERAL- Femoral / SMS Stent, Poss General;  Surgeon: ALEX Alfred III, MD;  Location: [facility] OR [address] [place] [address];  Service: Vascular;  Laterality: Left;  mGy : 2698.78  Gy.cm : 229.88  Contrast : 62ml  Fluro time : 13.8min    ARTERIOGRAPHY OF SUBCLAVIAN ARTERY  8/27/2024    Procedure: ARTERIOGRAM, SUBCLAVIAN;  Surgeon: Stanton Jefferson MD;  Location: [facility] CATH LAB;  Service: Cardiology;;    CARDIAC CATHETERIZATION      CARDIAC SURGERY      CARPAL TUNNEL RELEASE Left     CATARACT EXTRACTION      CATARACT EXTRACTION W/ INTRAOCULAR LENS  IMPLANT, BILATERAL  2008    CATHETERIZATION OF BOTH LEFT AND RIGHT HEART N/A 11/2/2018    Procedure: CATHETERIZATION, HEART, BOTH LEFT AND  RIGHT;  Surgeon: Frank Gallardo MD;  Location: [facility] CATH LAB;  Service: Cardiology;  Laterality: N/A;    COLONOSCOPY  8/2013    COLONOSCOPY N/A 5/30/2016    Procedure: COLONOSCOPY;  Surgeon: Anna Tomas MD;  Location: [facility] ENDO;  Service: Endoscopy;  Laterality: N/A;    COLONOSCOPY N/A 7/17/2019    Procedure: COLONOSCOPY;  Surgeon: David Carter III, MD;  Location: [facility] ENDO;  Service: Endoscopy;  Laterality: N/A;    COLONOSCOPY N/A 12/14/2023    Procedure: COLONOSCOPY;  Surgeon: Ama Barrera MD;  Location: [facility] ENDO;  Service: Endoscopy;  Laterality: N/A;    COLONOSCOPY N/A 7/27/2024    Procedure: COLONOSCOPY;  Surgeon: Ama Barrera MD;  Location: [facility] ENDO;  Service: Endoscopy;  Laterality: N/A;    COMPUTED TOMOGRAPHY N/A 9/9/2019    Procedure: CT (COMPUTED TOMOGRAPHY);  Surgeon: [facility] Diagnostic Provider;  Location: [facility] OR;  Service: General;  Laterality: N/A;  Microwave ablation to be done in CT.  Need CRNA and cart    COMPUTED TOMOGRAPHY N/A 1/25/2022    Procedure: Liver Microwave Ablation;  Surgeon: Red Puentes MD;  Location: [facility];  Service: General;  Laterality: N/A;    CORONARY ARTERY BYPASS GRAFT (CABG) N/A 11/5/2018    Procedure: CORONARY ARTERY BYPASS GRAFT (CABG);  Surgeon: Bear De Luna MD;  Location: [facility] OR;  Service: Cardiothoracic;  Laterality: N/A;  TWO VESSEL BYPASS WITH AORTOTOMY AND EXCISION OF ATHEROSCLEROTIC PLAQUE    CORONARY BYPASS GRAFT ANGIOGRAPHY  3/2/2020    Procedure: Bypass graft study;  Surgeon: Cora Cormier MD;  Location: [facility] CATH LAB;  Service: Cardiology;;    CORONARY BYPASS GRAFT ANGIOGRAPHY  8/27/2024    Procedure: Bypass graft study;  Surgeon: Stanton Jefferson MD;  Location: [facility] CATH LAB;  Service: Cardiology;;    ECHOCARDIOGRAM,TRANSESOPHAGEAL N/A 6/24/2024    Procedure: Transesophageal echo (BERNARD) intra-procedure log documentation;  Surgeon: Nacho Downs MD;  Location: [facility] EP LAB;  Service: Cardiology;  Laterality: N/A;     ELBOW BURSA SURGERY Right 2010    ENDOSCOPIC HARVEST OF VEIN Left 11/5/2018    Procedure: SURGICAL PROCUREMENT, VEIN, ENDOSCOPIC;  Surgeon: Bear De Luna MD;  Location: [facility];  Service: Cardiothoracic;  Laterality: Left;    ESOPHAGOGASTRODUODENOSCOPY N/A 7/17/2019    Procedure: ESOPHAGOGASTRODUODENOSCOPY (EGD);  Surgeon: David Carter III, MD;  Location: [facility] ENDO;  Service: Endoscopy;  Laterality: N/A;    ESOPHAGOGASTRODUODENOSCOPY N/A 12/29/2022    Procedure: ESOPHAGOGASTRODUODENOSCOPY (EGD);  Surgeon: Issa Gayle MD;  Location: [facility] ENDO;  Service: Endoscopy;  Laterality: N/A;    ESOPHAGOGASTRODUODENOSCOPY N/A 1/17/2024    Procedure: EGD (ESOPHAGOGASTRODUODENOSCOPY);  Surgeon: Issa Gayle MD;  Location: [facility] ENDO;  Service: Endoscopy;  Laterality: N/A;    ESOPHAGOGASTRODUODENOSCOPY N/A 4/30/2024    Procedure: EGD (ESOPHAGOGASTRODUODENOSCOPY);  Surgeon: Eder Foley MD;  Location: [facility] ENDO;  Service: Gastroenterology;  Laterality: N/A;    ESOPHAGOGASTRODUODENOSCOPY N/A 7/27/2024    Procedure: EGD (ESOPHAGOGASTRODUODENOSCOPY);  Surgeon: Ama Barrera MD;  Location: [facility];  Service: Endoscopy;  Laterality: N/A;    ETHMOIDECTOMY Bilateral 3/27/2019    Procedure: ETHMOIDECTOMY;  Surgeon: Alejandro Parkinson MD;  Location: [facility] OR;  Service: ENT;  Laterality: Bilateral;    EYE SURGERY      FOOT SURGERY      FRONTAL SINUS OBLITERATION Bilateral 3/27/2019    Procedure: SINUSOTOMY, FRONTAL SINUS, OBLITERATIVE;  Surgeon: Alejandro Parkinson MD;  Location: [facility];  Service: ENT;  Laterality: Bilateral;    FUNCTIONAL ENDOSCOPIC SINUS SURGERY (FESS) Bilateral 3/27/2019    Procedure: FESS (FUNCTIONAL ENDOSCOPIC SINUS SURGERY);  Surgeon: Alejandro Parkinson MD;  Location: [facility] OR;  Service: ENT;  Laterality: Bilateral;  Left Keila bullosa resection     INTRALUMINAL GASTROINTESTINAL TRACT IMAGING VIA CAPSULE N/A 2/2/2024    Procedure: IMAGING PROCEDURE, GI TRACT, INTRALUMINAL, VIA CAPSULE;  Surgeon: Sameer  Cooper RN;  Location: [facility] ENDO;  Service: Endoscopy;  Laterality: N/A;    INTRALUMINAL GASTROINTESTINAL TRACT IMAGING VIA CAPSULE N/A 7/31/2024    Procedure: IMAGING PROCEDURE, GI TRACT, INTRALUMINAL, VIA CAPSULE;  Surgeon: Cooper Estrella RN;  Location: [facility] ENDO;  Service: Endoscopy;  Laterality: N/A;    KNEE ARTHROSCOPY W/ MENISCAL REPAIR Left 06/2019    dr boykin    KNEE SURGERY Right     LEFT HEART CATHETERIZATION Left 3/2/2020    Procedure: CATHETERIZATION, HEART, LEFT;  Surgeon: Cora Cormier MD;  Location: [facility] CATH LAB;  Service: Cardiology;  Laterality: Left;    LIVER BIOPSY  01/2022    MAXILLARY ANTROSTOMY Bilateral 3/27/2019    Procedure: MAXILLARY ANTROSTOMY;  Surgeon: Alejandro Parkinson MD;  Location: [facility] OR;  Service: ENT;  Laterality: Bilateral;    NASAL SEPTOPLASTY N/A 3/27/2019    Procedure: SEPTOPLASTY, NOSE;  Surgeon: Alejandro Parkinson MD;  Location: [facility] OR;  Service: ENT;  Laterality: N/A;    OCCLUSION OF LEFT ATRIAL APPENDAGE N/A 11/8/2024    Procedure: Left atrial appendage occlusion;  Surgeon: Horacio Huerta MD;  Location: [facility] EP LAB;  Service: Cardiology;  Laterality: N/A;  afib, watchman, BSCI, homer, ANALILIA oconnor, 3prep *contrast prep pt*    RECTAL SURGERY  2012    RIGHT HEART CATHETERIZATION Right 8/27/2024    Procedure: INSERTION, CATHETER, RIGHT HEART;  Surgeon: Stanton Jefferson MD;  Location: [facility] CATH LAB;  Service: Cardiology;  Laterality: Right;    STENT, SUPERIOR MESENTERIC ARTERY Left 1/4/2024    Procedure: STENT, SUPERIOR MESENTERIC ARTERY;  Surgeon: ALEX Alfred III, MD;  Location: [facility] OR 2ND FLR;  Service: Vascular;  Laterality: Left;    TRANSESOPHAGEAL ECHOCARDIOGRAPHY N/A 11/8/2024    Procedure: ECHOCARDIOGRAM, TRANSESOPHAGEAL;  Surgeon: Jamar See MD;  Location: [facility] EP LAB;  Service: Cardiology;  Laterality: N/A;    TRANSESOPHAGEAL ECHOCARDIOGRAPHY N/A 12/20/2024    Procedure: ECHOCARDIOGRAM, TRANSESOPHAGEAL;  Surgeon: Thomas Israel MD;  Location: [facility] CATH LAB;   Service: Cardiology;  Laterality: N/A;    TRANSURETHRAL RESECTION OF PROSTATE (TURP) WITHOUT USE OF LASER N/A 6/19/2018    Procedure: TURP, WITHOUT USING LASER;  Surgeon: Cooper Cordova IV, MD;  Location: [facility] OR;  Service: Urology;  Laterality: N/A;    TRIGGER FINGER RELEASE Left     VALVE STUDY-AORTIC  8/27/2024    Procedure: Valve study-aortic;  Surgeon: Stanton Jefferson MD;  Location: [facility] CATH LAB;  Service: Cardiology;;       Family History:   Family History   Problem Relation Name Age of Onset    Heart disease Mother      Heart disease Father      Heart disease Brother      Colon cancer Neg Hx       He has no known family history of liver disease.     Social History:  reports that he quit smoking about 52 years ago. His smoking use included cigarettes. He started smoking about 56 years ago. He has a 2 pack-year smoking history. He quit smokeless tobacco use about 48 years ago.  His smokeless tobacco use included chew. He reports current alcohol use of about 16.0 standard drinks of alcohol per week. He reports that he does not use drugs.    He has no history of Alcohol     He denies history of IV drug use/Tatto  He  denies high-risk sexual contacts, no raw seafood, no sick contacts      Allergies:  Review of patient's allergies indicates:   Allergen Reactions    Lipitor [atorvastatin] Other (See Comments)     Muscle aches and pains as well as fatigue.     Niacin preparations Other (See Comments)     Causes broken blood vessels and bruises at 4 times normal dose.    Statins-hmg-coa reductase inhibitors Other (See Comments)     Statins cause intolerable myalgias.    Iodinated contrast media Hives     Tachycardia    Omeprazole Hives and Itching    Prilosec [omeprazole magnesium] Hives and Itching       Medications:  Current Medications[1]    Review of Systems   Constitutional:  Negative for fatigue, fever and unexpected weight change.   HENT:  Negative for ear pain, nosebleeds and trouble swallowing.   "  Eyes:  Negative for discharge and redness.   Respiratory:  Negative for cough and shortness of breath.    Cardiovascular:  Negative for palpitations and leg swelling.   Gastrointestinal:  Negative for abdominal distention, abdominal pain, diarrhea and vomiting.   Endocrine: Negative for cold intolerance and polyuria.   Genitourinary:  Negative for flank pain and hematuria.   Musculoskeletal:  Negative for back pain.   Skin:  Negative for pallor.   Neurological:  Negative for seizures and headaches.   Hematological:  Does not bruise/bleed easily.   Psychiatric/Behavioral:  Negative for confusion and hallucinations.        MELD 3.0: 6 at 4/23/2025  9:05 AM  MELD-Na: 6 at 4/23/2025  9:05 AM  Calculated from:  Serum Creatinine: 0.8 mg/dL (Using min of 1 mg/dL) at 4/23/2025  9:05 AM  Serum Sodium: 139 mmol/L (Using max of 137 mmol/L) at 4/23/2025  9:05 AM  Total Bilirubin: 0.6 mg/dL (Using min of 1 mg/dL) at 4/23/2025  9:05 AM  Serum Albumin: 3.6 g/dL (Using max of 3.5 g/dL) at 4/23/2025  9:05 AM  INR(ratio): 1 at 4/23/2025  9:05 AM  Age at listing (hypothetical): 78 years  Sex: Male at 4/23/2025  9:05 AM       Objective:      Vitals:   Vitals:    05/13/25 1324   BP: 118/68   BP Location: Left arm   Patient Position: Sitting   Pulse: 95   SpO2: 99%   Weight: 103 kg (227 lb 1.2 oz)   Height: 5' 7" (1.702 m)       Physical Exam  Constitutional:       Appearance: Normal appearance.   HENT:      Head: Normocephalic and atraumatic.      Right Ear: Tympanic membrane and external ear normal.      Left Ear: Tympanic membrane and external ear normal.      Mouth/Throat:      Mouth: Mucous membranes are moist.   Eyes:      Extraocular Movements: Extraocular movements intact.      Pupils: Pupils are equal, round, and reactive to light.   Cardiovascular:      Rate and Rhythm: Normal rate and regular rhythm.      Pulses: Normal pulses.      Heart sounds: Normal heart sounds.   Pulmonary:      Effort: Pulmonary effort is normal.      " Breath sounds: Normal breath sounds.   Abdominal:      General: Bowel sounds are normal. There is no distension.      Palpations: Abdomen is soft. There is no mass.      Tenderness: There is no abdominal tenderness.   Musculoskeletal:         General: No swelling or deformity. Normal range of motion.      Cervical back: Normal range of motion and neck supple.   Skin:     Coloration: Skin is not jaundiced.   Neurological:      General: No focal deficit present.      Mental Status: He is alert and oriented to person, place, and time.      Cranial Nerves: No cranial nerve deficit.   Psychiatric:         Mood and Affect: Mood normal.         Behavior: Behavior normal.         Laboratory Data:  No visits with results within 1 Week(s) from this visit.   Latest known visit with results is:   Lab Visit on 04/23/2025   Component Date Value Ref Range Status    Sodium 04/23/2025 139  136 - 145 mmol/L Final    Potassium 04/23/2025 4.2  3.5 - 5.1 mmol/L Final    Chloride 04/23/2025 104  95 - 110 mmol/L Final    CO2 04/23/2025 26  23 - 29 mmol/L Final    Glucose 04/23/2025 132 (H)  70 - 110 mg/dL Final    BUN 04/23/2025 20  8 - 23 mg/dL Final    Creatinine 04/23/2025 0.8  0.5 - 1.4 mg/dL Final    Calcium 04/23/2025 9.4  8.7 - 10.5 mg/dL Final    Protein Total 04/23/2025 7.3  6.0 - 8.4 gm/dL Final    Albumin 04/23/2025 3.6  3.5 - 5.2 g/dL Final    Bilirubin Total 04/23/2025 0.6  0.1 - 1.0 mg/dL Final    For infants and newborns, interpretation of results should be based   on gestational age, weight and in agreement with clinical   observations.    Premature Infant recommended reference ranges:   0-24 hours:  <8.0 mg/dL   24-48 hours: <12.0 mg/dL   3-5 days:    <15.0 mg/dL   6-29 days:   <15.0 mg/dL    ALP 04/23/2025 78  40 - 150 unit/L Final    AST 04/23/2025 22  11 - 45 unit/L Final    ALT 04/23/2025 20  10 - 44 unit/L Final    Anion Gap 04/23/2025 9  8 - 16 mmol/L Final    eGFR 04/23/2025 >60  >60 mL/min/1.73/m2 Final     "Estimated GFR calculated using the CKD-EPI creatinine (2021) equation.    AFP 04/23/2025 3.1  <=8.4 ng/mL Final    PT 04/23/2025 11.5  9.0 - 12.5 seconds Final    INR 04/23/2025 1.0  0.8 - 1.2 Final    Coumadin Therapy:    2.0 - 3.0 for INR for all indicators except mechanical heart valves    and antiphospholipid syndromes which should use 2.5 - 3.5.    WBC 04/23/2025 3.20 (L)  3.90 - 12.70 K/uL Final    RBC 04/23/2025 4.77  4.60 - 6.20 M/uL Final    HGB 04/23/2025 14.4  14.0 - 18.0 gm/dL Final    HCT 04/23/2025 43.2  40.0 - 54.0 % Final    MCV 04/23/2025 91  82 - 98 fL Final    MCH 04/23/2025 30.2  27.0 - 31.0 pg Final    MCHC 04/23/2025 33.3  32.0 - 36.0 g/dL Final    RDW 04/23/2025 16.5 (H)  11.5 - 14.5 % Final    Platelet Count 04/23/2025 127 (L)  150 - 450 K/uL Final    MPV 04/23/2025 10.1  9.2 - 12.9 fL Final    Nucleated RBC 04/23/2025 0  <=0 /100 WBC Final    Neut % 04/23/2025 50.0  38 - 73 % Final    Lymph % 04/23/2025 28.8  18 - 48 % Final    Mono % 04/23/2025 17.2 (H)  4 - 15 % Final    Eos % 04/23/2025 2.8  <=8 % Final    Basophil % 04/23/2025 0.9  <=1.9 % Final    Imm Grans % 04/23/2025 0.3  0.0 - 0.5 % Final    Neut # 04/23/2025 1.60 (L)  1.8 - 7.7 K/uL Final    Lymph # 04/23/2025 0.92 (L)  1 - 4.8 K/uL Final    Mono # 04/23/2025 0.55  0.3 - 1 K/uL Final    Eos # 04/23/2025 0.09  <=0.5 K/uL Final    Baso # 04/23/2025 0.03  <=0.2 K/uL Final    Imm Grans # 04/23/2025 0.01  0.00 - 0.04 K/uL Final    Mild elevation in immature granulocytes is non specific and can be seen in a variety of conditions including stress response, acute inflammation, trauma and pregnancy. Correlation with other laboratory and clinical findings is essential.       Lab Results   Component Value Date    INR 1.0 04/23/2025    INR 1.0 11/01/2024    INR 1.0 10/03/2024    PROTIME 11.5 04/23/2025       No results found for: "SMOOTHMUSCAB", "MITOAB"  Lab Results   Component Value Date    IRON 75 04/14/2025    TIBC 345 04/14/2025    " "FERRITIN 125.7 04/14/2025     Lab Results   Component Value Date    HEPBCAB Negative 04/12/2014    HBEAG NEGATIVE 04/12/2014     Lab Results   Component Value Date    TSH 1.304 04/09/2025     Lab Results   Component Value Date    MADAY Negative 11/13/2007       No results found for: "ABORH"        Lab Results   Component Value Date    HGBA1C 6.9 (H) 04/09/2025     Lab Results   Component Value Date    CHOL 165 04/09/2025    CHOL 93 (L) 10/09/2024    CHOL 115 (L) 08/27/2024     Lab Results   Component Value Date    HDL 47 04/09/2025    HDL 54 10/09/2024    HDL 47 08/27/2024     Lab Results   Component Value Date    LDLCALC 74.8 04/09/2025    LDLCALC 27.4 (L) 10/09/2024    LDLCALC 49.4 (L) 08/27/2024     Lab Results   Component Value Date    TRIG 216 (H) 04/09/2025    TRIG 58 10/09/2024    TRIG 93 08/27/2024     Lab Results   Component Value Date    CHOLHDL 28.5 04/09/2025    CHOLHDL 58.1 (H) 10/09/2024    CHOLHDL 40.9 08/27/2024         I personally reviewed imaging studies and outside records..      Assessment:       1. Other cirrhosis of liver    2. HCC (hepatocellular carcinoma)    3. Liver disease, unspecified                 Plan:     Problem List Items Addressed This Visit          Oncology    HCC (hepatocellular carcinoma)    Relevant Orders    MRI Abdomen W WO Contrast    CBC Auto Differential    Comprehensive Metabolic Panel    Miscellaneous Test, Sendout Dereck Liver    AFP Tumor Marker    Protime-INR       GI    Other cirrhosis of liver - Primary     Other Visit Diagnoses         Liver disease, unspecified        Relevant Orders    MRI Abdomen W WO Contrast            Erendira was seen today for cirrhosis and hepatocellular carcinoma.    Diagnoses and all orders for this visit:    Other cirrhosis of liver    HCC (hepatocellular carcinoma)  -     MRI Abdomen W WO Contrast; Standing  -     CBC Auto Differential; Standing  -     Comprehensive Metabolic Panel; Standing  -     Miscellaneous Test, Sendout Dereck " Liver; Future  -     AFP Tumor Marker; Standing  -     Protime-INR; Standing    Liver disease, unspecified  -     MRI Abdomen W WO Contrast; Standing        HCC  continue to monitor for HCC recurrence alternating MRI and ultrasound every 6 months       Cirrhosis  Well compensated clinically  EGD 4/24--No varices   At present plan to monitor the MELD and decompensating event  Dexa scan  Check Hepatitis A/B immune status and Immunization with PCP  if not immune    Return to clinic in 6 months.    I have sent communication to the referring physician and/or primary care provider.      Time Statement  A total time spent includes time preparing to see patient, reviewing  diagnostic studies and records, direct face-to-face visit, completing orders, medications , reconciliation, prescription management, and care coordination    We discussed in depth the nature of the patient's disease, the management plan in details. I have provided the patient with an opportunity to ask questions and have all questions answered to his satisfaction.     Discussed with patient that it is likely that she will see results before Myself or my nurse are able to view them and report results due to the Cures Act passed 4/1/21. Results will be sent immediately to the patient who are enrolled in the patient portal. If results come through after business hours or on weekend, we will not see them until the next business day that we are in the office. If resulted during the business day, we will likely not be able to review them until after completing all patient visits in office that day.       Issa Gayle MD  Transplant Hepatologist and Gastroenterologist  [facility]         [1]   Current Outpatient Medications   Medication Sig Dispense Refill    albuterol (PROVENTIL HFA) 90 mcg/actuation inhaler Inhale 2 puffs into the lungs every 6 (six) hours as needed for Wheezing. Rescue       amLODIPine (NORVASC) 5 MG tablet Take 1 tablet (5 mg total) by mouth once daily. 90 tablet 3    budesonide-formoterol 160-4.5 mcg (SYMBICORT) 160-4.5 mcg/actuation HFAA INHALE 2 PUFFS INTO THE LUNGS TWO TIMES A DAY. 10.2 g 11    carvediloL (COREG) 3.125 MG tablet TAKE 1 TABLET BY MOUTH TWICE DAILY with a meal 60 tablet 6    cimetidine (TAGAMET) 300 MG tablet Take one tablet by mouth on 11/7/2024 at 4:15 pm, one tablet on 11/7/2024 at 10:15 pm, and then one tablet on 11/8/2024 at 5:15 am for pre-procedure 3 tablet 0    clopidogreL (PLAVIX) 75 mg tablet Take 1 tablet (75 mg total) by mouth once daily. 90 tablet 3    empagliflozin (JARDIANCE) 25 mg tablet Take 1 tablet (25 mg total) by mouth once daily. 90 tablet 3    famotidine (PEPCID) 40 MG tablet Take 40 mg by mouth once daily.      finasteride (PROSCAR) 5 mg tablet TAKE 1 TABLET BY MOUTH once daily 90 tablet 3    furosemide (LASIX) 40 MG tablet Take 1 tablet (40 mg total) by mouth once daily. 90 tablet 3    insulin glargine U-100, Lantus, (LANTUS SOLOSTAR U-100 INSULIN) 100 unit/mL (3 mL) InPn pen Inject 44 Units into the skin every evening. 45 mL 1    krill oil 500 mg Cap Take 1 capsule by mouth Daily.      levothyroxine (SYNTHROID, LEVOTHROID) 175 MCG tablet TAKE 2 TABLETS BY MOUTH before breakfast 180 tablet 1    lisinopriL 10 MG tablet Take 1 tablet (10 mg total) by mouth once daily. 90 tablet 3    metFORMIN (GLUCOPHAGE-XR) 500 MG ER 24hr tablet Take 1 tablet (500 mg total) by mouth 2 (two) times daily with meals. 180 tablet 3    mirabegron (MYRBETRIQ) 50 mg Tb24 Take 1 tablet (50 mg total) by mouth once daily. 30 tablet 5    montelukast (SINGULAIR) 10 mg tablet Take 1 tablet (10 mg total) by mouth once daily. 90 tablet 3    multivitamin (THERAGRAN) per tablet Take 1 tablet by mouth once daily. Centrum silver      RABEprazole (ACIPHEX) 20 mg tablet Take 1 tablet (20 mg total) by mouth 2 (two) times daily. 180 tablet 3    REPATHA SURECLICK 140 mg/mL PnMagno INJECT  "140mg subcutaneously every 14 days 2 mL 11    sildenafiL (VIAGRA) 100 MG tablet Take 1 tablet (100 mg total) by mouth daily as needed for Erectile Dysfunction. 10 tablet 11    ACCU-CHEK GRACE PLUS METER Misc AS DIRECTED 1 each 0    blood sugar diagnostic (ACCU-CHEK GRACE PLUS TEST STRP) Strp TEST THREE TIMES A  strip 11    diphenhydrAMINE (BENADRYL) 50 MG capsule Take one tablet by mouth on 11/7/2024 at 4:15 pm, one tablet on 11/7/2024 at 10:15 pm, and then one tablet on 11/8/2024 at 5:15 am for pre-procedure (Patient not taking: Reported on 5/13/2025) 3 capsule 0    lancets (ACCU-CHEK FASTCLIX LANCET DRUM) Misc TEST TWO TIMES A  each 5    pen needle, diabetic (BD ULTRA-FINE MINI PEN NEEDLE) 31 gauge x 3/16" Ndle USE AS DIRECTED 100 each 11     No current facility-administered medications for this visit.     Facility-Administered Medications Ordered in Other Visits   Medication Dose Route Frequency Provider Last Rate Last Admin    lactated ringers infusion   Intravenous Continuous Omaira Theodore MD   New Bag at 09/09/19 1117     " 2 = difficulty understanding (not related to language barrier)